# Patient Record
Sex: FEMALE | NOT HISPANIC OR LATINO | URBAN - METROPOLITAN AREA
[De-identification: names, ages, dates, MRNs, and addresses within clinical notes are randomized per-mention and may not be internally consistent; named-entity substitution may affect disease eponyms.]

---

## 2019-06-22 ENCOUNTER — EMERGENCY (EMERGENCY)
Facility: HOSPITAL | Age: 49
LOS: 1 days | Discharge: HOME | End: 2019-06-22
Attending: EMERGENCY MEDICINE | Admitting: EMERGENCY MEDICINE
Payer: COMMERCIAL

## 2019-06-22 VITALS
DIASTOLIC BLOOD PRESSURE: 99 MMHG | RESPIRATION RATE: 16 BRPM | HEART RATE: 94 BPM | SYSTOLIC BLOOD PRESSURE: 158 MMHG | OXYGEN SATURATION: 100 % | TEMPERATURE: 98 F

## 2019-06-22 LAB
ALBUMIN SERPL ELPH-MCNC: 3.9 G/DL — SIGNIFICANT CHANGE UP (ref 3.3–5)
ALP SERPL-CCNC: 82 U/L — SIGNIFICANT CHANGE UP (ref 40–120)
ALT FLD-CCNC: 16 U/L — SIGNIFICANT CHANGE UP (ref 4–33)
ANION GAP SERPL CALC-SCNC: 8 MMO/L — SIGNIFICANT CHANGE UP (ref 7–14)
ANISOCYTOSIS BLD QL: SIGNIFICANT CHANGE UP
AST SERPL-CCNC: 18 U/L — SIGNIFICANT CHANGE UP (ref 4–32)
BASOPHILS # BLD AUTO: 0.01 K/UL — SIGNIFICANT CHANGE UP (ref 0–0.2)
BASOPHILS NFR BLD AUTO: 0.1 % — SIGNIFICANT CHANGE UP (ref 0–2)
BILIRUB SERPL-MCNC: < 0.2 MG/DL — LOW (ref 0.2–1.2)
BUN SERPL-MCNC: 8 MG/DL — SIGNIFICANT CHANGE UP (ref 7–23)
CALCIUM SERPL-MCNC: 9.6 MG/DL — SIGNIFICANT CHANGE UP (ref 8.4–10.5)
CHLORIDE SERPL-SCNC: 106 MMOL/L — SIGNIFICANT CHANGE UP (ref 98–107)
CO2 SERPL-SCNC: 23 MMOL/L — SIGNIFICANT CHANGE UP (ref 22–31)
CREAT SERPL-MCNC: 0.58 MG/DL — SIGNIFICANT CHANGE UP (ref 0.5–1.3)
EOSINOPHIL # BLD AUTO: 0.2 K/UL — SIGNIFICANT CHANGE UP (ref 0–0.5)
EOSINOPHIL NFR BLD AUTO: 2.2 % — SIGNIFICANT CHANGE UP (ref 0–6)
GIANT PLATELETS BLD QL SMEAR: PRESENT — SIGNIFICANT CHANGE UP
GLUCOSE SERPL-MCNC: 94 MG/DL — SIGNIFICANT CHANGE UP (ref 70–99)
HCT VFR BLD CALC: 38.3 % — SIGNIFICANT CHANGE UP (ref 34.5–45)
HGB BLD-MCNC: 10.8 G/DL — LOW (ref 11.5–15.5)
HYPOCHROMIA BLD QL: SIGNIFICANT CHANGE UP
IMM GRANULOCYTES NFR BLD AUTO: 0.2 % — SIGNIFICANT CHANGE UP (ref 0–1.5)
LG PLATELETS BLD QL AUTO: SLIGHT — SIGNIFICANT CHANGE UP
LYMPHOCYTES # BLD AUTO: 1.41 K/UL — SIGNIFICANT CHANGE UP (ref 1–3.3)
LYMPHOCYTES # BLD AUTO: 15.5 % — SIGNIFICANT CHANGE UP (ref 13–44)
MAGNESIUM SERPL-MCNC: 1.9 MG/DL — SIGNIFICANT CHANGE UP (ref 1.6–2.6)
MANUAL SMEAR VERIFICATION: SIGNIFICANT CHANGE UP
MCHC RBC-ENTMCNC: 19.2 PG — LOW (ref 27–34)
MCHC RBC-ENTMCNC: 28.2 % — LOW (ref 32–36)
MCV RBC AUTO: 68.1 FL — LOW (ref 80–100)
MICROCYTES BLD QL: SIGNIFICANT CHANGE UP
MONOCYTES # BLD AUTO: 0.7 K/UL — SIGNIFICANT CHANGE UP (ref 0–0.9)
MONOCYTES NFR BLD AUTO: 7.7 % — SIGNIFICANT CHANGE UP (ref 2–14)
NEUTROPHILS # BLD AUTO: 6.74 K/UL — SIGNIFICANT CHANGE UP (ref 1.8–7.4)
NEUTROPHILS NFR BLD AUTO: 74.3 % — SIGNIFICANT CHANGE UP (ref 43–77)
NRBC # FLD: 0 K/UL — SIGNIFICANT CHANGE UP (ref 0–0)
PLATELET # BLD AUTO: 318 K/UL — SIGNIFICANT CHANGE UP (ref 150–400)
PLATELET CLUMP BLD QL SMEAR: SLIGHT — SIGNIFICANT CHANGE UP
PLATELET COUNT - ESTIMATE: NORMAL — SIGNIFICANT CHANGE UP
PMV BLD: SIGNIFICANT CHANGE UP FL (ref 7–13)
POIKILOCYTOSIS BLD QL AUTO: SLIGHT — SIGNIFICANT CHANGE UP
POTASSIUM SERPL-MCNC: 4.2 MMOL/L — SIGNIFICANT CHANGE UP (ref 3.5–5.3)
POTASSIUM SERPL-SCNC: 4.2 MMOL/L — SIGNIFICANT CHANGE UP (ref 3.5–5.3)
PROT SERPL-MCNC: 7.9 G/DL — SIGNIFICANT CHANGE UP (ref 6–8.3)
RBC # BLD: 5.62 M/UL — HIGH (ref 3.8–5.2)
RBC # FLD: 17.7 % — HIGH (ref 10.3–14.5)
SODIUM SERPL-SCNC: 137 MMOL/L — SIGNIFICANT CHANGE UP (ref 135–145)
TROPONIN T, HIGH SENSITIVITY: < 6 NG/L — SIGNIFICANT CHANGE UP (ref ?–14)
TSH SERPL-MCNC: 1.42 UIU/ML — SIGNIFICANT CHANGE UP (ref 0.27–4.2)
WBC # BLD: 9.08 K/UL — SIGNIFICANT CHANGE UP (ref 3.8–10.5)
WBC # FLD AUTO: 9.08 K/UL — SIGNIFICANT CHANGE UP (ref 3.8–10.5)

## 2019-06-22 PROCEDURE — 99218: CPT

## 2019-06-22 PROCEDURE — 70553 MRI BRAIN STEM W/O & W/DYE: CPT | Mod: 26

## 2019-06-22 PROCEDURE — 70496 CT ANGIOGRAPHY HEAD: CPT | Mod: 26

## 2019-06-22 PROCEDURE — 70498 CT ANGIOGRAPHY NECK: CPT | Mod: 26

## 2019-06-22 PROCEDURE — 93010 ELECTROCARDIOGRAM REPORT: CPT | Mod: 59

## 2019-06-22 PROCEDURE — 71046 X-RAY EXAM CHEST 2 VIEWS: CPT | Mod: 26

## 2019-06-22 RX ORDER — DIAZEPAM 5 MG
5 TABLET ORAL ONCE
Refills: 0 | Status: DISCONTINUED | OUTPATIENT
Start: 2019-06-22 | End: 2019-06-22

## 2019-06-22 RX ORDER — SODIUM CHLORIDE 9 MG/ML
1000 INJECTION INTRAMUSCULAR; INTRAVENOUS; SUBCUTANEOUS ONCE
Refills: 0 | Status: COMPLETED | OUTPATIENT
Start: 2019-06-22 | End: 2019-06-22

## 2019-06-22 RX ORDER — AMLODIPINE BESYLATE 2.5 MG/1
2.5 TABLET ORAL AT BEDTIME
Refills: 0 | Status: DISCONTINUED | OUTPATIENT
Start: 2019-06-22 | End: 2019-06-23

## 2019-06-22 RX ADMIN — AMLODIPINE BESYLATE 2.5 MILLIGRAM(S): 2.5 TABLET ORAL at 23:03

## 2019-06-22 RX ADMIN — Medication 1.5 MILLIGRAM(S): at 19:47

## 2019-06-22 RX ADMIN — Medication 1 MILLIGRAM(S): at 12:54

## 2019-06-22 RX ADMIN — SODIUM CHLORIDE 1000 MILLILITER(S): 9 INJECTION INTRAMUSCULAR; INTRAVENOUS; SUBCUTANEOUS at 11:43

## 2019-06-22 NOTE — CONSULT NOTE ADULT - ASSESSMENT
48F with intermittent headaches and CTH demonstrating colloid cyst.   - MRI w/wo contrast   - q. 4 hour neuro checks   - Will follow up with plan s/p MRI re: admission vs. outpatient follow up

## 2019-06-22 NOTE — ED CDU PROVIDER INITIAL DAY NOTE - ATTENDING CONTRIBUTION TO CARE
HOLLY VAZQUEZ MD: Reviewed and agree with the HPI as documented below:  48yF with a past medical history of HTN, asthma, panic attacks presenting with dizziness/lightheadedness with headache x 1 week. Pt reports she intermittently feels lightheaded and dizzy, not described as room spinning dizziness, that lasts for 30 minutes at a time. Associated she feels nausea and sometimes has b/l blurry vision. She reports intermittent head pressure and chest tightness as well (unsure if this is her panicking). Pt denies shortness of breath, palpitations, numbness, weakness, fever/chills, abd pain, n/v/d, back pain, recent travel or illness or any other concerns.  In ED EKG within normal, trop <6, CT head with small colloid cyst  Neurosurgical consulted who recommends MRI head, sent to CDU for MRI    As per UptoDate:  "Colloid cyst — Colloid cysts are a rare developmental malformation and not a true neoplasm. Colloid cysts are composed of an outer fibrous layer and an inner epithelium of ciliated or mucin-producing cells.    The vast majority of colloid cysts occur in the anterior third ventricle, between the forniceal columns in the roof of the third ventricle. Thus, even relatively small lesions may block the foramen of Monro, producing hydrocephalus.    They can occur at any age but usually become symptomatic in the third to the sixth decades. Although typically asymptomatic, colloid cysts can cause increased intracranial pressure and present with headache and papilledema. The headaches are typically frontal, intermittent, severe, of short duration, and associated with nausea and vomiting. The headache can be relieved by lying down [208]. Occasionally, gait abnormalities may occur as a result of hydrocephalus. The classic clinical description of intermittent headaches and drop attacks occurs in only one-third of patients. Rarely, sudden death can occur from obstruction of the ventricular system.    Imaging demonstrates iso- or hyperdense lesions on noncontrast CT. On MRI they may be T2 hyperintense or have a hypointense center. They do not enhance.    Surgical excision is curative, although it may be technically difficult. For patients with hydrocephalus, a ventriculoperitoneal shunt may be necessary. Stereotactic aspiration of a cyst may be useful, although there is a rate of high recurrence. Patients with small asymptomatic colloid cysts without evidence of hydrocephalus may be closely followed by serial examinations and neuroimaging studies. Colloid cyst — Colloid cysts are a rare developmental malformation and not a true neoplasm. Colloid cysts are composed of an outer fibrous layer and an inner epithelium of ciliated or mucin-producing cells.  The vast majority of colloid cysts occur in the anterior third ventricle, between the forniceal columns in the roof of the third ventricle. Thus, even relatively small lesions may block the foramen of Monro, producing hydrocephalus.  They can occur at any age but usually become symptomatic in the third to the sixth decades. Although typically asymptomatic, colloid cysts can cause increased intracranial pressure and present with headache and papilledema."    PHYSICAL EXAM:  Vital signs reviewed.  GENERAL: Patient is awake and alert and in no acute distress.  Non-toxic appearing.  A+Ox4  HEAD:  Airway patent.  No oropharyngeal edema.  No stridor.  Auricles are normal.    EYES: EOM grossly intact, conjunctiva non-injected and sclera clear  NECK: Supple, No vertebral point tenderness to palpation.  CHEST/LUNG: Lungs clear to auscultation bilaterally; no wheeze, no rhonchi,  no rales.    HEART: Regular rate and rhythm;   ABDOMEN: Soft, non-tender to palpation.  No rebound/no guarding.  Bowel sounds present x 4.   MSK/EXTREMITIES: No clubbing or cyanosis. Back is nontender, with no vertebral point tenderness to palpation, no CVAT.  Moving all 4 extremities.     NEURO: Neurologically grossly intact.   No obvious deficits.   PSYCH: Psychiatrically normal mood and affect.  No apparent risk to self or others.       DR. CESAR, ATTENDING MD:    I performed a face to face bedside interview with patient regarding history of present illness, review of symptoms and past medical history. I completed an independent physical exam.  I have discussed patient's plan of care with the team of health care providers.   I agree with note as stated above, having amended the EMR as needed to reflect my findings. I have discussed the assessment and plan of care.  This includes during the time I functioned as the attending physician for this patient.

## 2019-06-22 NOTE — ED CDU PROVIDER INITIAL DAY NOTE - OBJECTIVE STATEMENT
48yF w/pmhx HTN, asthma, panic attacks presenting with dizziness/lightheadedness with headache x 1 week. Pt reports she intermittently feels lightheaded and dizzy, not described as room spinning dizziness, that lasts for 30 minutes at a time. Associated she feels nausea and sometimes has b/l blurry vision. She reports intermittent head pressure and chest tightness as well (unsure if this is her panicking). Pt denies shortness of breath, palpitations, numbness, weakness, fever/chills, abd pain, n/v/d, back pain, recent travel or illness or any other concerns.  In ED EKG within normal, trop <6, CT head with small colloid cyst  Neurosurg consulted who recommends MRI head, sent to CDU for MRI

## 2019-06-22 NOTE — ED ADULT NURSE NOTE - OBJECTIVE STATEMENT
pt to rm 10 c/o dizziness for couple of days/  iv started in rt hand 22 g labs sent off/ fluids hung/  pt on cm nsr  in [place]

## 2019-06-22 NOTE — ED PROVIDER NOTE - OBJECTIVE STATEMENT
48F with pmh of HTN, asthma, panic attacks p/w intermittent chest tightness, dizziness, head pressure, left neck pain, blurry vision, dizziness, L arm tingling x 1 week.  Pt had an episode this morning after waking up. Describes lightheaded feeling, but not vertigo. No syncope. Feels much improved now. Pt denies exertional or pleuritic symptoms, neck injury, dysarthria, focal weakness, blood thinner use, dyspnea, PE risk factors. No prior stress test. No hx of strokes.

## 2019-06-22 NOTE — CONSULT NOTE ADULT - SUBJECTIVE AND OBJECTIVE BOX
CHIEF COMPLAINT/ REASON FOR CONSULTATION:    Headache   HPI:  48F presents to the ED c/o intermittent headache and nausea over the past week, associated with L arm pain and chest pain. She says that she has had headaches before, but these headaches have been more severe. No known triggers, and usually they resolved spontaneously after approximately 30 minutes. No loss of conciousness. She had a CTH performed in the ED which demonstrated a colloid cyst for which neurosurgery was consulted.     PAST MEDICAL HISTORY   Hypertension  Polyp of vagina  Asthma  Anemia    PAST SURGICAL HISTORY   No Past Surgical History        MEDICATIONS:  Antibiotics:    Neuro:    Anticoagulation:    Other:  amLODIPine   Tablet 2.5 milliGRAM(s) Oral at bedtime      SOCIAL HISTORY:   , lives at home.     FAMILY HISTORY:      REVIEW OF SYSTEMS:  Check here if all are normal other than Neurological []  Negative except as stated above.     PHYSICAL EXAMINATION:   T(C): 36.9 (06-22-19 @ 17:31), Max: 36.9 (06-22-19 @ 17:31)  HR: 86 (06-22-19 @ 17:31) (86 - 94)  BP: 143/93 (06-22-19 @ 17:31) (143/93 - 158/99)  RR: 17 (06-22-19 @ 17:31) (16 - 20)  SpO2: 100% (06-22-19 @ 17:31) (100% - 100%)  Wt(kg): --Height (cm): 160.02 (06-22 @ 17:31)  Weight (kg): 86.2 (06-22 @ 17:31)    Exam:  Awake, Alert, AOX3  PERRL, EOMI, Face equal, Tongue m/l  5/5 throughout, no drift  SILT    LABS:                        10.8   9.08  )-----------( 318      ( 22 Jun 2019 12:05 )             38.3     06-22    137  |  106  |  8   ----------------------------<  94  4.2   |  23  |  0.58    Ca    9.6      22 Jun 2019 12:05  Mg     1.9     06-22    TPro  7.9  /  Alb  3.9  /  TBili  < 0.2<L>  /  DBili  x   /  AST  18  /  ALT  16  /  AlkPhos  82  06-22          RADIOLOGY & ADDITIONAL STUDIES:  CT BRAIN:    A 5 mm hyperdense lesion anterior to the third ventricle which likely   represent a colloid cyst.    The ventricles and sulci are normal in size.    No acute intracranial hemorrhage, extra-axial fluid collection,   hydrocephalus, mass effect or midline shift.    The calvarium is intact. Left maxillary sinus mucosal polyp versus   retention cyst. Remaining paranasal sinuses and mastoid air cells are   free of acute disease    CT ANGIOGRAPHY NECK:    Three-vessel left-sided aortic arch.    There is good enhancement of the bilateral common and internal carotid   arteries. The vertebral arteries are well visualized.    No flow-limiting stenosis, arterial dissection or vascular aneurysm.   Carotid bifurcations and origins of great vessels are unremarkable.    Heterogeneous enlarged thyroid gland.    CT ANGIOGRAPHY BRAIN:    There is no significant stenosis of the bilateral anterior, middle and   posterior cerebral arteries and basilar artery.    The intracranial internal carotid and vertebral arteries show normal   enhancement.    No vascular aneurysm or flow-limiting stenosis. No enlarged vascular   lesions or clusters of abnormal vessels are noted to suggest an arterial   venous malformation.    Visualized portions of the superficial and deep venous systems are   unremarkable.

## 2019-06-22 NOTE — ED PROVIDER NOTE - PHYSICAL EXAMINATION
GEN - NAD; well appearing; A+O x3   HEAD - NC/AT     EYES - EOMI, no conjunctival pallor, no scleral icterus  ENT -   mucous membranes  moist , no discharge      NECK - Neck supple, no masses or bruits  PULM - CTA b/l,  symmetric breath sounds  COR -  RRR, S1 S2, no murmurs  ABD - , ND, NT, soft, no guarding, no rebound, no masses    BACK - no CVA tenderness, nontender spine     EXTREMS - no edema, no deformity, warm and well perfused    SKIN - no rash or bruising      NEUROLOGIC - A&Ox3, PERRLA, EOMI, no nystagmus, CN2-12 grossly intact, no pronator drift, normal finger to nose and rapid alternating finger movements, normal sensation and 5/5 strength in all extremities, normal gait, symmetric patellar reflexes

## 2019-06-22 NOTE — ED PROVIDER NOTE - CLINICAL SUMMARY MEDICAL DECISION MAKING FREE TEXT BOX
48F with pmh of panic attacks, HTN, asthma p/w multiple complaints including vision changes, dizziness, chest tightness, L arm tingling, tinnitus, neck pain. Normal exam. EKG nonischemic. Vital hypertensive. Will r/o ACS, dysrhythmia, electrolyte abnormality, thyrotoxicosis, consider internal carotid/vertebral artery dissection, ?TIA. Do not think PE or AD. Plan for tele monitoring, labs, including trop/TSH, CXR, CT head, CTA neck/head, consider CDU for tele monitoring and stress.

## 2019-06-22 NOTE — ED PROVIDER NOTE - PROGRESS NOTE DETAILS
pt feels improved.  no headache or chest pain. no longer dizzy. CT results reviewed, +colloid cyst, spoke to neurosurgery and will come eval pt. Pt offered CDU for stress test, but prefers to schedule it as outpatient. Neurosurgery would like MRI brain with/without contrast. OK for pt to go to CDU. Will see pt once done in OR. Pt signed out to CDU team. Pt has hx of claustrophobia, willing to get sedation to obtain MRI.

## 2019-06-23 VITALS
OXYGEN SATURATION: 100 % | TEMPERATURE: 98 F | DIASTOLIC BLOOD PRESSURE: 88 MMHG | SYSTOLIC BLOOD PRESSURE: 140 MMHG | RESPIRATION RATE: 17 BRPM | HEART RATE: 88 BPM

## 2019-06-23 PROCEDURE — 99217: CPT

## 2019-06-23 RX ORDER — ACETAMINOPHEN 500 MG
650 TABLET ORAL ONCE
Refills: 0 | Status: COMPLETED | OUTPATIENT
Start: 2019-06-23 | End: 2019-06-23

## 2019-06-23 RX ORDER — AMLODIPINE BESYLATE 2.5 MG/1
2.5 TABLET ORAL AT BEDTIME
Refills: 0 | Status: DISCONTINUED | OUTPATIENT
Start: 2019-06-23 | End: 2019-06-26

## 2019-06-23 RX ORDER — AMLODIPINE BESYLATE 2.5 MG/1
10 TABLET ORAL DAILY
Refills: 0 | Status: DISCONTINUED | OUTPATIENT
Start: 2019-06-23 | End: 2019-06-23

## 2019-06-23 RX ADMIN — Medication 650 MILLIGRAM(S): at 12:19

## 2019-06-23 RX ADMIN — Medication 650 MILLIGRAM(S): at 12:09

## 2019-06-23 NOTE — ED CDU PROVIDER SUBSEQUENT DAY NOTE - HISTORY
CDU Initial Day Note: 48yF w/pmhx HTN, asthma, panic attacks presenting with dizziness/lightheadedness with headache x 1 week. Pt reports she intermittently feels lightheaded and dizzy, not described as room spinning dizziness, that lasts for 30 minutes at a time. Associated she feels nausea and sometimes has b/l blurry vision. She reports intermittent head pressure and chest tightness as well (unsure if this is her panicking). Pt denies shortness of breath, palpitations, numbness, weakness, fever/chills, abd pain, n/v/d, back pain, recent travel or illness or any other concerns. In ED EKG within normal, trop <6, CT head with small colloid cyst  Neurosurg consulted who recommends MRI head, sent to CDU for MRI  CDU Subsequent Day Note: HENRY Osullivan, Agree w/ above, pt pending MRI brain. No acute interval changes since sign out at 7pm yesterday.

## 2019-06-23 NOTE — ED CDU PROVIDER SUBSEQUENT DAY NOTE - ATTENDING CONTRIBUTION TO CARE
Dr Bloch, ATTENDING MD-  I performed a face to face bedside interview with patient regarding history of present illness, review of symptoms and past medical history. I completed an independent physical exam.  I have discussed patient's plan of care with PA.   Documentation as above in the note.  Patient examined well appearing, Full EOM, perrl 2-12 intact, no cerebellar deficits, heart sounds nml lungs clear abd soft nontender. motor and sensory intact. Symptoms are lightheadedness and not vertigo, possibly BP med related.

## 2019-06-23 NOTE — ED CDU PROVIDER SUBSEQUENT DAY NOTE - MEDICAL DECISION MAKING DETAILS
48yF w/pmhx HTN, asthma, panic attacks p/w dizziness and lightheadedness x 1 week, intermittent. CT head with small colloid cyst, non focal neuro exam. Sent to CDU for MRI and neurosurgery consult. Pt is asymptomatic at this time. As per UptoDate: "The headaches are typically frontal, intermittent, severe, of short duration, and associated with nausea and vomiting. The headache can be relieved by lying down. Occasionally, gait abnormalities may occur as a result of hydrocephalus. The classic clinical description of intermittent headaches and drop attacks occurs in only one-third of patients. Rarely, sudden death can occur from obstruction of the ventricular system.  Imaging demonstrates iso- or hyperdense lesions on noncontrast CT. On MRI they may be T2 hyperintense or have a hypointense center. They do not enhance.  Surgical excision is curative, although it may be technically difficult. For patients with hydrocephalus, a ventriculoperitoneal shunt may be necessary. Stereotactic aspiration of a cyst may be useful, although there is a rate of high recurrence. Patients with small asymptomatic colloid cysts without evidence of hydrocephalus may be closely followed by serial examinations and neuroimaging studies."

## 2019-06-23 NOTE — ED CDU PROVIDER DISPOSITION NOTE - ATTENDING CONTRIBUTION TO CARE
Dr Bloch, ATTENDING MD-  I performed a face to face bedside interview with patient regarding history of present illness, review of symptoms and past medical history. I completed an independent physical exam.  I have discussed patient's plan of care with PA.   Documentation as above in the note.   patient well appearing asymptomatic- okay for d/c

## 2019-06-23 NOTE — ED CDU PROVIDER DISPOSITION NOTE - CLINICAL COURSE
PT is a 48yF w/pmhx HTN, asthma, panic attacks who presented to ED c/o dizziness/lightheadedness with headache x 1 week. Pt notes sx have been a/w recent change in taking her amlodipine in AM. Pt seen and worked up in ED, CTA's negative, only showing small colloid cyst. Neurosurgery consulted, recommended MR's. Pt transferred to CDU for; MRI brain, neuro checks, vitals q4 and frequent re-evals. Pt labs wnl, trop neg <6, cxr normal. Pt MRI brain no acute findings, discussed results with neurosurgery team who state patient stable for dc and outpatient f/u with Dr. Tabor. Results reviewed and discussed with patient. Advised she f/u with PCP regarding BP and possible medication side effects, advised to stay hydrated, rest and f/u with PCP and Neurosurgery. All questions and concerns addressed. Strict return instructions given. No complaints noted.

## 2019-06-23 NOTE — ED CDU PROVIDER DISPOSITION NOTE - CARE PROVIDER_API CALL
Maninder Tabor)  Neurosurgery  General  [address], [address]  [place] [address]  Phone: [phone]  Fax: [phone]  Follow Up Time: 1-3 Days

## 2019-06-23 NOTE — ED CDU PROVIDER DISPOSITION NOTE - NSFOLLOWUPINSTRUCTIONS_ED_ALL_ED_FT
Patient advised to follow up with  PRIMARY CARE DOCTOR IN 1-2 DAYS AND NEUROSURGERY WITHIN WEEK and told to return to the emergency department immediately for any new or concerning symptoms such as worsening dizziness, headaches, nausea, vomiting, room spinning, weakness, numbness or tingling  OR ANY OTHER COMPLAINTS. Patient agrees with plan.    Continue home medications   Rest, stay hydrated   Monitor blood pressure at home, recommend speaking with primary care doctor to make possible adjustments to blood pressure medication as dizziness could possibly be side effect   -FOLLOW UP WITH NEUROSURGEON REGARDING MRI RESULTS

## 2019-06-28 PROBLEM — I10 ESSENTIAL (PRIMARY) HYPERTENSION: Chronic | Status: ACTIVE | Noted: 2019-06-22

## 2019-07-02 ENCOUNTER — APPOINTMENT (OUTPATIENT)
Dept: NEUROSURGERY | Facility: CLINIC | Age: 49
End: 2019-07-02
Payer: COMMERCIAL

## 2019-07-02 VITALS
DIASTOLIC BLOOD PRESSURE: 90 MMHG | OXYGEN SATURATION: 99 % | HEIGHT: 62 IN | HEART RATE: 81 BPM | RESPIRATION RATE: 16 BRPM | BODY MASS INDEX: 34.96 KG/M2 | TEMPERATURE: 98.3 F | SYSTOLIC BLOOD PRESSURE: 153 MMHG | WEIGHT: 190 LBS

## 2019-07-02 DIAGNOSIS — Z86.2 PERSONAL HISTORY OF DISEASES OF THE BLOOD AND BLOOD-FORMING ORGANS AND CERTAIN DISORDERS INVOLVING THE IMMUNE MECHANISM: ICD-10-CM

## 2019-07-02 DIAGNOSIS — Z86.79 PERSONAL HISTORY OF OTHER DISEASES OF THE CIRCULATORY SYSTEM: ICD-10-CM

## 2019-07-02 DIAGNOSIS — Z78.9 OTHER SPECIFIED HEALTH STATUS: ICD-10-CM

## 2019-07-02 DIAGNOSIS — Z83.3 FAMILY HISTORY OF DIABETES MELLITUS: ICD-10-CM

## 2019-07-02 DIAGNOSIS — Z82.49 FAMILY HISTORY OF ISCHEMIC HEART DISEASE AND OTHER DISEASES OF THE CIRCULATORY SYSTEM: ICD-10-CM

## 2019-07-02 DIAGNOSIS — Z86.59 PERSONAL HISTORY OF OTHER MENTAL AND BEHAVIORAL DISORDERS: ICD-10-CM

## 2019-07-02 PROCEDURE — 99243 OFF/OP CNSLTJ NEW/EST LOW 30: CPT

## 2019-07-02 NOTE — PHYSICAL EXAM
[General Appearance - Alert] : alert [General Appearance - In No Acute Distress] : in no acute distress [General Appearance - Well Developed] : well developed [General Appearance - Well Nourished] : well nourished [General Appearance - Well-Appearing] : healthy appearing [Oriented To Time, Place, And Person] : oriented to person, place, and time [Impaired Insight] : insight and judgment were intact [Memory Recent] : recent memory was not impaired [Mood] : the mood was normal [Affect] : the affect was normal [Person] : oriented to person [Place] : oriented to place [Cranial Nerves Optic (II)] : visual acuity intact bilaterally,  pupils equal round and reactive to light [Time] : oriented to time [Cranial Nerves Oculomotor (III)] : extraocular motion intact [Cranial Nerves Vestibulocochlear (VIII)] : hearing was intact bilaterally [Cranial Nerves Trigeminal (V)] : facial sensation intact symmetrically [Cranial Nerves Facial (VII)] : face symmetrical [Cranial Nerves Glossopharyngeal (IX)] : tongue and palate midline [Cranial Nerves Accessory (XI - Cranial And Spinal)] : head turning and shoulder shrug symmetric [Cranial Nerves Hypoglossal (XII)] : there was no tongue deviation with protrusion [Motor Handedness Right-Handed] : the patient is right hand dominant [Motor Strength] : muscle strength was normal in all four extremities [5] : C5 Biceps 5/5 [Balance] : balance was intact [Sclera] : the sclera and conjunctiva were normal [Outer Ear] : the ears and nose were normal in appearance [Hearing Threshold Finger Rub Not Howard] : hearing was normal [Extraocular Movements] : extraocular movements were intact [PERRL With Normal Accommodation] : pupils were equal in size, round, reactive to light, with normal accommodation [Oropharynx] : the oropharynx was normal [Respiration, Rhythm And Depth] : normal respiratory rhythm and effort [Neck Appearance] : the appearance of the neck was normal [Auscultation Breath Sounds / Voice Sounds] : lungs were clear to auscultation bilaterally [Exaggerated Use Of Accessory Muscles For Inspiration] : no accessory muscle use [Apical Impulse] : the apical impulse was normal [Heart Sounds] : normal S1 and S2 [Heart Rate And Rhythm] : heart rate was normal and rhythm regular [Abnormal Walk] : normal gait [Involuntary Movements] : no involuntary movements were seen [Skin Color & Pigmentation] : normal skin color and pigmentation [Motor Tone] : muscle strength and tone were normal [1+] : Ankle jerk right 1+ [Sensation Tactile Decrease] : light touch was intact [Sensation Pain / Temperature Decrease] : pain and temperature was intact [Limited Balance] : balance was intact [FreeTextEntry7] : normal and equal sensation in all extremities [No APD] : no afferent pupillary defect [Full Visual Field] : full visual field [] : the neck was supple [FreeTextEntry1] : Fundus exam normal.

## 2019-07-02 NOTE — CONSULT LETTER
[Dear  ___] : Dear ~MYIA, [Consult Letter:] : I had the pleasure of evaluating your patient, [unfilled]. [Please see my note below.] : Please see my note below. [Consult Closing:] : Thank you very much for allowing me to participate in the care of this patient.  If you have any questions, please do not hesitate to contact me. [Sincerely,] : Sincerely, [FreeTextEntry2] : Teo Portillo MD\par [address]\[place] [address] [FreeTextEntry3] : Kameron Rodney MD, FACS, FAANS\par Professor and \par Department of Neurosurgery\par [facility] [place]\par [address] [place] [address], [address] [place] [address]\[place] [address]\par [phone] Clinical\par [phone] Academic\par

## 2019-07-02 NOTE — ASSESSMENT
[FreeTextEntry1] : IMPRESSION:\par 1. Very small colloid cyst (5 mm) in the roof of the IIIrd ventricle with no hydrocephalus - incidental\par 2. Hypertension\par 3. Episode of headache, nausea, dizziness\par 4. Social anxiety disorder\par \par PLAN:\par 1. Repeat MRI brain wo in 1 year.\par 2. Reassured patient that small size of colloid cyst is not contributing to her headaches.\par 3. Encouraged her to seek professional help for her anxiety\par 4. Discussed with Dr. Teo Portillo\par \par Kameron Rodney MD, FACS, FAANS\par Professor and \par Department of Neurosurgery\par [facility]\par [address], [address]\[place] [address]\par [phone] Clinical\par [phone] Academic\par \par

## 2019-07-02 NOTE — REASON FOR VISIT
[New Patient Visit] : a new patient visit [Referred By: _________] : Patient was referred by MAKAYLA [Spouse] : spouse

## 2019-07-02 NOTE — REVIEW OF SYSTEMS
[Dizziness] : dizziness [Anxiety] : anxiety [Chest Pain] : chest pain [Negative] : Heme/Lymph [de-identified] : headaches [FreeTextEntry2] : fatigue [FreeTextEntry5] : with panic attacks [FreeTextEntry3] : blurry vision

## 2019-07-02 NOTE — HISTORY OF PRESENT ILLNESS
[< 3 months] : less than 3 months [de-identified] : Te Zuluaga is a pleasant right handed 48 year old lady of [place] descent who presents for consultation regarding a small colloid cyst that was found incidentally on MRI brain done during an ER visit at [facility].  Per patient, after recent titration of BP medications she had an episode of elevated blood pressure, nausea, dizziness, generalized pain, head pressure.  She described pain as being sharp, 5-6/10 and more so in both temporal areas.  Pain was controlled with Tylenol prn.\par \par She is currently under the care of her PCP Dr. Teo Portillo,[address] [place] [id number], [phone]\par \par PMHX includes HTN, asthma, anxiety disorder, social anxiety \par \par Social history,  with 1 child (5 years old). [FreeTextEntry1] : "Colloid cyst"

## 2019-07-02 NOTE — DATA REVIEWED
[de-identified] : EXAM: MR BRAIN WAW IC    PROCEDURE DATE: Jun 22 2019     INTERPRETATION: CLINICAL INFORMATION: Headache. Colloid cyst seen on recent  CT of the head.   TECHNIQUE: Multiplanar, multisequence MRI of the brain was performed without  and with contrast.   8.5 mL of Gadavist was injected, with 1.5 mL discarded.   COMPARISON: CT head 6/22/2019 at 1:22 PM.   FINDINGS:   0.5 cm round rim-enhancing lesion in the roof of the 3rd ventricle (13, 88),  consistent with the colloid cyst seen on recent CT of the head. No  effacement of the foramen of Monro or associated hydrocephalus. The lesion  is intermediate in signal on T1 and hypointense on T2 compatible with  proteinaceous contents.   No midline shift, mass effect, vasogenic edema or intracranial hemorrhage.  No abnormal extra-axial or intra-axial fluid collections. No abnormal  diffusion restriction to suggest an acute or subacute infarct. No focal  parenchymal signal abnormality. Flow voids are seen within the major  intracranial vessels, consistent with their patency.   The visualized paranasal sinuses and mastoid air cells are clear. The  orbits, sellar and suprasellar structures, and craniocervical junction are  unremarkable.   IMPRESSION:   Small colloid cyst along the roof of the 3rd ventricle. No associated mass  effect or hydrocephalus.   No other acute intracranial pathology.        KEITH BURDICK M.D., RADIOLOGY RESIDENT  This document has been electronically signed.  BOBBY TROTTER M.D., ATTENDING RADIOLOGIST  This document has been electronically signed. Jun 23 2019 9:10AM

## 2019-12-17 ENCOUNTER — EMERGENCY (EMERGENCY)
Facility: HOSPITAL | Age: 49
LOS: 1 days | Discharge: HOME | End: 2019-12-17
Attending: EMERGENCY MEDICINE | Admitting: EMERGENCY MEDICINE
Payer: COMMERCIAL

## 2019-12-17 VITALS
OXYGEN SATURATION: 100 % | DIASTOLIC BLOOD PRESSURE: 77 MMHG | RESPIRATION RATE: 18 BRPM | HEART RATE: 80 BPM | TEMPERATURE: 97 F | SYSTOLIC BLOOD PRESSURE: 148 MMHG

## 2019-12-17 LAB
ALBUMIN SERPL ELPH-MCNC: 4.1 G/DL — SIGNIFICANT CHANGE UP (ref 3.3–5)
ALP SERPL-CCNC: 83 U/L — SIGNIFICANT CHANGE UP (ref 40–120)
ALT FLD-CCNC: 19 U/L — SIGNIFICANT CHANGE UP (ref 4–33)
ANION GAP SERPL CALC-SCNC: 12 MMO/L — SIGNIFICANT CHANGE UP (ref 7–14)
ANISOCYTOSIS BLD QL: SIGNIFICANT CHANGE UP
APPEARANCE UR: CLEAR — SIGNIFICANT CHANGE UP
AST SERPL-CCNC: 19 U/L — SIGNIFICANT CHANGE UP (ref 4–32)
BASE EXCESS BLDV CALC-SCNC: 1.6 MMOL/L — SIGNIFICANT CHANGE UP
BASOPHILS # BLD AUTO: 0.03 K/UL — SIGNIFICANT CHANGE UP (ref 0–0.2)
BASOPHILS NFR BLD AUTO: 0.4 % — SIGNIFICANT CHANGE UP (ref 0–2)
BASOPHILS NFR SPEC: 0.9 % — SIGNIFICANT CHANGE UP (ref 0–2)
BILIRUB SERPL-MCNC: 0.2 MG/DL — SIGNIFICANT CHANGE UP (ref 0.2–1.2)
BILIRUB UR-MCNC: NEGATIVE — SIGNIFICANT CHANGE UP
BLASTS # FLD: 0 % — SIGNIFICANT CHANGE UP (ref 0–0)
BLOOD GAS VENOUS - CREATININE: 0.74 MG/DL — SIGNIFICANT CHANGE UP (ref 0.5–1.3)
BLOOD GAS VENOUS - FIO2: 21 — SIGNIFICANT CHANGE UP
BLOOD UR QL VISUAL: NEGATIVE — SIGNIFICANT CHANGE UP
BUN SERPL-MCNC: 8 MG/DL — SIGNIFICANT CHANGE UP (ref 7–23)
CALCIUM SERPL-MCNC: 9.2 MG/DL — SIGNIFICANT CHANGE UP (ref 8.4–10.5)
CHLORIDE BLDV-SCNC: 101 MMOL/L — SIGNIFICANT CHANGE UP (ref 96–108)
CHLORIDE SERPL-SCNC: 100 MMOL/L — SIGNIFICANT CHANGE UP (ref 98–107)
CO2 SERPL-SCNC: 23 MMOL/L — SIGNIFICANT CHANGE UP (ref 22–31)
COLOR SPEC: COLORLESS — SIGNIFICANT CHANGE UP
CREAT SERPL-MCNC: 0.68 MG/DL — SIGNIFICANT CHANGE UP (ref 0.5–1.3)
ELLIPTOCYTES BLD QL SMEAR: SLIGHT — SIGNIFICANT CHANGE UP
EOSINOPHIL # BLD AUTO: 0.21 K/UL — SIGNIFICANT CHANGE UP (ref 0–0.5)
EOSINOPHIL NFR BLD AUTO: 2.9 % — SIGNIFICANT CHANGE UP (ref 0–6)
EOSINOPHIL NFR FLD: 0 % — SIGNIFICANT CHANGE UP (ref 0–6)
GAS PNL BLDV: 139 MMOL/L — SIGNIFICANT CHANGE UP (ref 136–146)
GIANT PLATELETS BLD QL SMEAR: PRESENT — SIGNIFICANT CHANGE UP
GLUCOSE BLDV-MCNC: 91 MG/DL — SIGNIFICANT CHANGE UP (ref 70–99)
GLUCOSE SERPL-MCNC: 95 MG/DL — SIGNIFICANT CHANGE UP (ref 70–99)
GLUCOSE UR-MCNC: NEGATIVE — SIGNIFICANT CHANGE UP
HCO3 BLDV-SCNC: 25 MMOL/L — SIGNIFICANT CHANGE UP (ref 20–27)
HCT VFR BLD CALC: 36.4 % — SIGNIFICANT CHANGE UP (ref 34.5–45)
HCT VFR BLDV CALC: 33.2 % — LOW (ref 34.5–45)
HGB BLD-MCNC: 10.5 G/DL — LOW (ref 11.5–15.5)
HGB BLDV-MCNC: 10.8 G/DL — LOW (ref 11.5–15.5)
HYPOCHROMIA BLD QL: SIGNIFICANT CHANGE UP
IMM GRANULOCYTES NFR BLD AUTO: 0.4 % — SIGNIFICANT CHANGE UP (ref 0–1.5)
KETONES UR-MCNC: NEGATIVE — SIGNIFICANT CHANGE UP
LACTATE BLDV-MCNC: 1.4 MMOL/L — SIGNIFICANT CHANGE UP (ref 0.5–2)
LEUKOCYTE ESTERASE UR-ACNC: NEGATIVE — SIGNIFICANT CHANGE UP
LIDOCAIN IGE QN: 18.9 U/L — SIGNIFICANT CHANGE UP (ref 7–60)
LYMPHOCYTES # BLD AUTO: 1.19 K/UL — SIGNIFICANT CHANGE UP (ref 1–3.3)
LYMPHOCYTES # BLD AUTO: 16.3 % — SIGNIFICANT CHANGE UP (ref 13–44)
LYMPHOCYTES NFR SPEC AUTO: 23.5 % — SIGNIFICANT CHANGE UP (ref 13–44)
MCHC RBC-ENTMCNC: 19.4 PG — LOW (ref 27–34)
MCHC RBC-ENTMCNC: 28.8 % — LOW (ref 32–36)
MCV RBC AUTO: 67.4 FL — LOW (ref 80–100)
METAMYELOCYTES # FLD: 0 % — SIGNIFICANT CHANGE UP (ref 0–1)
MICROCYTES BLD QL: SIGNIFICANT CHANGE UP
MONOCYTES # BLD AUTO: 0.64 K/UL — SIGNIFICANT CHANGE UP (ref 0–0.9)
MONOCYTES NFR BLD AUTO: 8.8 % — SIGNIFICANT CHANGE UP (ref 2–14)
MONOCYTES NFR BLD: 4.3 % — SIGNIFICANT CHANGE UP (ref 2–9)
MYELOCYTES NFR BLD: 0 % — SIGNIFICANT CHANGE UP (ref 0–0)
NEUTROPHIL AB SER-ACNC: 70.4 % — SIGNIFICANT CHANGE UP (ref 43–77)
NEUTROPHILS # BLD AUTO: 5.18 K/UL — SIGNIFICANT CHANGE UP (ref 1.8–7.4)
NEUTROPHILS NFR BLD AUTO: 71.2 % — SIGNIFICANT CHANGE UP (ref 43–77)
NEUTS BAND # BLD: 0 % — SIGNIFICANT CHANGE UP (ref 0–6)
NITRITE UR-MCNC: NEGATIVE — SIGNIFICANT CHANGE UP
NRBC # FLD: 0 K/UL — SIGNIFICANT CHANGE UP (ref 0–0)
OTHER - HEMATOLOGY %: 0 — SIGNIFICANT CHANGE UP
OVALOCYTES BLD QL SMEAR: SLIGHT — SIGNIFICANT CHANGE UP
PCO2 BLDV: 49 MMHG — SIGNIFICANT CHANGE UP (ref 41–51)
PH BLDV: 7.35 PH — SIGNIFICANT CHANGE UP (ref 7.32–7.43)
PH UR: 6.5 — SIGNIFICANT CHANGE UP (ref 5–8)
PLATELET # BLD AUTO: 323 K/UL — SIGNIFICANT CHANGE UP (ref 150–400)
PLATELET COUNT - ESTIMATE: NORMAL — SIGNIFICANT CHANGE UP
PMV BLD: SIGNIFICANT CHANGE UP FL (ref 7–13)
PO2 BLDV: 37 MMHG — SIGNIFICANT CHANGE UP (ref 35–40)
POIKILOCYTOSIS BLD QL AUTO: SLIGHT — SIGNIFICANT CHANGE UP
POLYCHROMASIA BLD QL SMEAR: SLIGHT — SIGNIFICANT CHANGE UP
POTASSIUM BLDV-SCNC: 3.9 MMOL/L — SIGNIFICANT CHANGE UP (ref 3.4–4.5)
POTASSIUM SERPL-MCNC: 4.1 MMOL/L — SIGNIFICANT CHANGE UP (ref 3.5–5.3)
POTASSIUM SERPL-SCNC: 4.1 MMOL/L — SIGNIFICANT CHANGE UP (ref 3.5–5.3)
PROMYELOCYTES # FLD: 0 % — SIGNIFICANT CHANGE UP (ref 0–0)
PROT SERPL-MCNC: 7.9 G/DL — SIGNIFICANT CHANGE UP (ref 6–8.3)
PROT UR-MCNC: NEGATIVE — SIGNIFICANT CHANGE UP
RBC # BLD: 5.4 M/UL — HIGH (ref 3.8–5.2)
RBC # FLD: 17.7 % — HIGH (ref 10.3–14.5)
SAO2 % BLDV: 58.2 % — LOW (ref 60–85)
SODIUM SERPL-SCNC: 135 MMOL/L — SIGNIFICANT CHANGE UP (ref 135–145)
SP GR SPEC: 1.01 — SIGNIFICANT CHANGE UP (ref 1–1.04)
TROPONIN T, HIGH SENSITIVITY: < 6 NG/L — SIGNIFICANT CHANGE UP (ref ?–14)
UROBILINOGEN FLD QL: NORMAL — SIGNIFICANT CHANGE UP
VARIANT LYMPHS # BLD: 0.9 % — SIGNIFICANT CHANGE UP
WBC # BLD: 7.28 K/UL — SIGNIFICANT CHANGE UP (ref 3.8–10.5)
WBC # FLD AUTO: 7.28 K/UL — SIGNIFICANT CHANGE UP (ref 3.8–10.5)

## 2019-12-17 PROCEDURE — 93010 ELECTROCARDIOGRAM REPORT: CPT

## 2019-12-17 PROCEDURE — 71046 X-RAY EXAM CHEST 2 VIEWS: CPT | Mod: 26

## 2019-12-17 PROCEDURE — 99284 EMERGENCY DEPT VISIT MOD MDM: CPT | Mod: 25

## 2019-12-17 RX ORDER — FAMOTIDINE 10 MG/ML
20 INJECTION INTRAVENOUS ONCE
Refills: 0 | Status: COMPLETED | OUTPATIENT
Start: 2019-12-17 | End: 2019-12-17

## 2019-12-17 RX ORDER — LIDOCAINE 4 G/100G
10 CREAM TOPICAL ONCE
Refills: 0 | Status: COMPLETED | OUTPATIENT
Start: 2019-12-17 | End: 2019-12-17

## 2019-12-17 RX ADMIN — Medication 30 MILLILITER(S): at 11:29

## 2019-12-17 RX ADMIN — LIDOCAINE 10 MILLILITER(S): 4 CREAM TOPICAL at 11:28

## 2019-12-17 RX ADMIN — FAMOTIDINE 20 MILLIGRAM(S): 10 INJECTION INTRAVENOUS at 11:29

## 2019-12-17 NOTE — ED PROVIDER NOTE - OBJECTIVE STATEMENT
47 yo female with a h/o HTN on amlodipine and asthma c/o 3 days of constant chest pain. Pain began at rest and is not worse with exertion. NO associated SOB, nausea or vomiting. No diarrhea or constipation. [place] [facility] [place] and started on Pepcid, sucralfate, and pantoprazole but hasn't had relief. NO LE pain or edema. No weakness or paresthesias. Pt also c/o intermittent back pain but no flank pain, dysuria or hematuria. No cough, fevers or chills. No recent travel or immobilization. + family h/o CAD- dad in his 60's at time of diagnosis. NO family h/o DVT/PE.

## 2019-12-17 NOTE — ED PROVIDER NOTE - PATIENT PORTAL LINK FT
You can access the FollowMyHealth Patient Portal offered by [facility] by registering at the following website: [email or web address]. By joining [employer]’s FollowMyHealth portal, you will also be able to view your health information using other applications (apps) compatible with our system.

## 2019-12-17 NOTE — ED PROVIDER NOTE - NSFOLLOWUPINSTRUCTIONS_ED_ALL_ED_FT
Make an appointment to follow up with the cardiologist DR Gamboa in 1-3 days. Return to the Emergency department for any worsening or concerning symptoms.

## 2019-12-17 NOTE — ED PROVIDER NOTE - PROGRESS NOTE DETAILS
Richelle: pt feeling better, offered CDU but declined. Pt need to  child. will d.c to f/u with outpatient cardiology. Richelle: pt feeling better, offered CDU stay for more of a cardiac workup but pt declined. Pt needs to  child and doesn't want to stay overnight. will d.c to f/u with outpatient cardiology. discussed return precautions and need for follow up with patient and she expressed understanding.

## 2019-12-17 NOTE — ED PROVIDER NOTE - CARE PROVIDER_API CALL
Heron Gamboa (MD)  Cardiovascular Disease; Internal Medicine  [address] [address]  Phone: [phone]  Fax: [phone]  Follow Up Time: 1-3 Days

## 2019-12-17 NOTE — ED ADULT NURSE NOTE - OBJECTIVE STATEMENT
patient  Alert & ox3. patient complains of epigastric  and back pain, belching. pt . evaluated by MD. No complains of shortness  of breath  or  abdominal pain noted at this time. Placed 20g angio cath rt. AC., labs drawn and sent. Patient is comfortable ,Due meds given as per order. patient will be waiting for results, further evaluation and disposition.  made comfortable. will continue to monitor.

## 2019-12-17 NOTE — ED ADULT TRIAGE NOTE - CHIEF COMPLAINT QUOTE
pt. c/o epigastric pain/back x 4-5 days, not correlated w/ eating, seen at urgent care on Sunday, prescribed pepcid, sucralfate and pantoprazole w/ little relief. Pt reports excessive belching. Denies SOB, n/v/d. PMHx HTN, asthma.

## 2019-12-17 NOTE — ED PROVIDER NOTE - CLINICAL SUMMARY MEDICAL DECISION MAKING FREE TEXT BOX
49 yo female with several days of constant chest pain. Low suspicion for cardiac etiology but will obatin labs, urine, CXR, EKG, treat symptomatically,  and reassess.

## 2019-12-21 ENCOUNTER — EMERGENCY (EMERGENCY)
Facility: HOSPITAL | Age: 49
LOS: 1 days | Discharge: HOME | End: 2019-12-21
Attending: EMERGENCY MEDICINE | Admitting: EMERGENCY MEDICINE
Payer: COMMERCIAL

## 2019-12-21 VITALS
TEMPERATURE: 99 F | OXYGEN SATURATION: 100 % | HEART RATE: 82 BPM | SYSTOLIC BLOOD PRESSURE: 135 MMHG | DIASTOLIC BLOOD PRESSURE: 83 MMHG | RESPIRATION RATE: 18 BRPM

## 2019-12-21 LAB
ALBUMIN SERPL ELPH-MCNC: 3.8 G/DL — SIGNIFICANT CHANGE UP (ref 3.3–5)
ALP SERPL-CCNC: 78 U/L — SIGNIFICANT CHANGE UP (ref 40–120)
ALT FLD-CCNC: 15 U/L — SIGNIFICANT CHANGE UP (ref 4–33)
ANION GAP SERPL CALC-SCNC: 11 MMO/L — SIGNIFICANT CHANGE UP (ref 7–14)
ANISOCYTOSIS BLD QL: SLIGHT — SIGNIFICANT CHANGE UP
AST SERPL-CCNC: 24 U/L — SIGNIFICANT CHANGE UP (ref 4–32)
BASOPHILS # BLD AUTO: 0.01 K/UL — SIGNIFICANT CHANGE UP (ref 0–0.2)
BASOPHILS NFR BLD AUTO: 0.1 % — SIGNIFICANT CHANGE UP (ref 0–2)
BASOPHILS NFR SPEC: 0.9 % — SIGNIFICANT CHANGE UP (ref 0–2)
BILIRUB SERPL-MCNC: < 0.2 MG/DL — LOW (ref 0.2–1.2)
BLASTS # FLD: 0 % — SIGNIFICANT CHANGE UP (ref 0–0)
BUN SERPL-MCNC: 10 MG/DL — SIGNIFICANT CHANGE UP (ref 7–23)
CALCIUM SERPL-MCNC: 9.1 MG/DL — SIGNIFICANT CHANGE UP (ref 8.4–10.5)
CHLORIDE SERPL-SCNC: 104 MMOL/L — SIGNIFICANT CHANGE UP (ref 98–107)
CO2 SERPL-SCNC: 21 MMOL/L — LOW (ref 22–31)
CREAT SERPL-MCNC: 0.62 MG/DL — SIGNIFICANT CHANGE UP (ref 0.5–1.3)
DACRYOCYTES BLD QL SMEAR: SLIGHT — SIGNIFICANT CHANGE UP
EOSINOPHIL # BLD AUTO: 0.24 K/UL — SIGNIFICANT CHANGE UP (ref 0–0.5)
EOSINOPHIL NFR BLD AUTO: 2.7 % — SIGNIFICANT CHANGE UP (ref 0–6)
EOSINOPHIL NFR FLD: 0.9 % — SIGNIFICANT CHANGE UP (ref 0–6)
GIANT PLATELETS BLD QL SMEAR: PRESENT — SIGNIFICANT CHANGE UP
GLUCOSE SERPL-MCNC: 89 MG/DL — SIGNIFICANT CHANGE UP (ref 70–99)
HCT VFR BLD CALC: 35 % — SIGNIFICANT CHANGE UP (ref 34.5–45)
HGB BLD-MCNC: 9.9 G/DL — LOW (ref 11.5–15.5)
HYPOCHROMIA BLD QL: SLIGHT — SIGNIFICANT CHANGE UP
IMM GRANULOCYTES NFR BLD AUTO: 0.2 % — SIGNIFICANT CHANGE UP (ref 0–1.5)
LYMPHOCYTES # BLD AUTO: 2.2 K/UL — SIGNIFICANT CHANGE UP (ref 1–3.3)
LYMPHOCYTES # BLD AUTO: 24.6 % — SIGNIFICANT CHANGE UP (ref 13–44)
LYMPHOCYTES NFR SPEC AUTO: 23 % — SIGNIFICANT CHANGE UP (ref 13–44)
MCHC RBC-ENTMCNC: 19.6 PG — LOW (ref 27–34)
MCHC RBC-ENTMCNC: 28.3 % — LOW (ref 32–36)
MCV RBC AUTO: 69.2 FL — LOW (ref 80–100)
METAMYELOCYTES # FLD: 0 % — SIGNIFICANT CHANGE UP (ref 0–1)
MICROCYTES BLD QL: SIGNIFICANT CHANGE UP
MONOCYTES # BLD AUTO: 1.02 K/UL — HIGH (ref 0–0.9)
MONOCYTES NFR BLD AUTO: 11.4 % — SIGNIFICANT CHANGE UP (ref 2–14)
MONOCYTES NFR BLD: 7.1 % — SIGNIFICANT CHANGE UP (ref 2–9)
MYELOCYTES NFR BLD: 0.9 % — HIGH (ref 0–0)
NEUTROPHIL AB SER-ACNC: 67.2 % — SIGNIFICANT CHANGE UP (ref 43–77)
NEUTROPHILS # BLD AUTO: 5.44 K/UL — SIGNIFICANT CHANGE UP (ref 1.8–7.4)
NEUTROPHILS NFR BLD AUTO: 61 % — SIGNIFICANT CHANGE UP (ref 43–77)
NEUTS BAND # BLD: 0 % — SIGNIFICANT CHANGE UP (ref 0–6)
NRBC # FLD: 0 K/UL — SIGNIFICANT CHANGE UP (ref 0–0)
OTHER - HEMATOLOGY %: 0 — SIGNIFICANT CHANGE UP
OVALOCYTES BLD QL SMEAR: SLIGHT — SIGNIFICANT CHANGE UP
PLATELET # BLD AUTO: 322 K/UL — SIGNIFICANT CHANGE UP (ref 150–400)
PLATELET COUNT - ESTIMATE: NORMAL — SIGNIFICANT CHANGE UP
PMV BLD: SIGNIFICANT CHANGE UP FL (ref 7–13)
POIKILOCYTOSIS BLD QL AUTO: SLIGHT — SIGNIFICANT CHANGE UP
POLYCHROMASIA BLD QL SMEAR: SLIGHT — SIGNIFICANT CHANGE UP
POTASSIUM SERPL-MCNC: 4.5 MMOL/L — SIGNIFICANT CHANGE UP (ref 3.5–5.3)
POTASSIUM SERPL-SCNC: 4.5 MMOL/L — SIGNIFICANT CHANGE UP (ref 3.5–5.3)
PROMYELOCYTES # FLD: 0 % — SIGNIFICANT CHANGE UP (ref 0–0)
PROT SERPL-MCNC: 7.6 G/DL — SIGNIFICANT CHANGE UP (ref 6–8.3)
RBC # BLD: 5.06 M/UL — SIGNIFICANT CHANGE UP (ref 3.8–5.2)
RBC # FLD: 17.7 % — HIGH (ref 10.3–14.5)
SMUDGE CELLS # BLD: PRESENT — SIGNIFICANT CHANGE UP
SODIUM SERPL-SCNC: 136 MMOL/L — SIGNIFICANT CHANGE UP (ref 135–145)
TROPONIN T, HIGH SENSITIVITY: < 6 NG/L — SIGNIFICANT CHANGE UP (ref ?–14)
VARIANT LYMPHS # BLD: 0 % — SIGNIFICANT CHANGE UP
WBC # BLD: 8.93 K/UL — SIGNIFICANT CHANGE UP (ref 3.8–10.5)
WBC # FLD AUTO: 8.93 K/UL — SIGNIFICANT CHANGE UP (ref 3.8–10.5)

## 2019-12-21 PROCEDURE — 99285 EMERGENCY DEPT VISIT HI MDM: CPT

## 2019-12-21 RX ORDER — ASPIRIN/CALCIUM CARB/MAGNESIUM 324 MG
162 TABLET ORAL ONCE
Refills: 0 | Status: COMPLETED | OUTPATIENT
Start: 2019-12-21 | End: 2019-12-21

## 2019-12-21 RX ADMIN — Medication 162 MILLIGRAM(S): at 23:15

## 2019-12-21 NOTE — ED ADULT NURSE NOTE - OBJECTIVE STATEMENT
pt brought into intake 12 A&oX4 amb self care 48 yr old female presents to the ed today for 3 days of constant chest pain. pain is mid epigastric. was seen at  an given mesd with no relief. pt has family hx of HD. 20G Iv placed in left ac labs drawn and sent.

## 2019-12-21 NOTE — ED ADULT NURSE NOTE - CHIEF COMPLAINT QUOTE
Pt c/o "tingling " on the lt side of her face/head/arm and hand [id number] weakness in leg--no facial droop. Dr José saw pt--no code stroke  Pt seen in ER on tuesday for chest pain--pt states it still comes and goes.

## 2019-12-21 NOTE — ED PROVIDER NOTE - OBJECTIVE STATEMENT
47 y/o female with PMHx of HTN presents to ED for L sided chest pain radiating to her back intermittently for a few days. Pt was seen in ED on Tuesday, 5 days ago, had - troponin and was sent home. Pt saw PMD yesterday and had her blood pressure medications changed due to high blood pressure. Pt is also c/o L facial tingling with no focal deficits. Also c/o tingling of her 3rd and 4th fingertips on her L hand. Facial and fingertip tingling symptoms are exactly same as symptoms she experienced a few months ago. At that time a few months ago, pt had CT of head, MRI and neuro f/u showing no concerning findings at that time. Pt has never had a stress test or taken Asprin. Non-smoker. Questionable family history. NKDA.

## 2019-12-21 NOTE — ED ADULT TRIAGE NOTE - CHIEF COMPLAINT QUOTE
Pt c/o "tingling " on the lt side of her face/head/arm and hand [id number]--np weakness in leg--no facial droop. Dr José saw pt--no code stroke  Pt seen in ER on tuesday for chest pain--pt states it still comes and goes.

## 2019-12-21 NOTE — ED PROVIDER NOTE - CLINICAL SUMMARY MEDICAL DECISION MAKING FREE TEXT BOX
49 y/o female with intermittent chest pain with nonspecific story. Pt also with neural complaint, normal neuro exam. Dr Teo Portillo, pt's PMD, not concerned about neuro symptoms. Reports pt had complete workup with no findings. PMD wants to keep pt in hospital for stress. Pt will be placed in CDU. Will give Asprin and repeat EKG. Pt is very agreeable to plan.

## 2019-12-22 VITALS
SYSTOLIC BLOOD PRESSURE: 123 MMHG | HEART RATE: 75 BPM | RESPIRATION RATE: 14 BRPM | TEMPERATURE: 98 F | OXYGEN SATURATION: 100 % | DIASTOLIC BLOOD PRESSURE: 67 MMHG

## 2019-12-22 LAB
CHOLEST SERPL-MCNC: 125 MG/DL — SIGNIFICANT CHANGE UP (ref 120–199)
HBA1C BLD-MCNC: 5.3 % — SIGNIFICANT CHANGE UP (ref 4–5.6)
HDLC SERPL-MCNC: 40 MG/DL — LOW (ref 45–65)
LIPID PNL WITH DIRECT LDL SERPL: 77 MG/DL — SIGNIFICANT CHANGE UP
TRIGL SERPL-MCNC: 89 MG/DL — SIGNIFICANT CHANGE UP (ref 10–149)
TROPONIN T, HIGH SENSITIVITY: < 6 NG/L — SIGNIFICANT CHANGE UP (ref ?–14)

## 2019-12-22 PROCEDURE — 93018 CV STRESS TEST I&R ONLY: CPT | Mod: GC

## 2019-12-22 PROCEDURE — 78452 HT MUSCLE IMAGE SPECT MULT: CPT | Mod: 26

## 2019-12-22 PROCEDURE — 93016 CV STRESS TEST SUPVJ ONLY: CPT | Mod: GC

## 2019-12-22 PROCEDURE — 99236 HOSP IP/OBS SAME DATE HI 85: CPT

## 2019-12-22 RX ORDER — ACETAMINOPHEN 500 MG
975 TABLET ORAL ONCE
Refills: 0 | Status: COMPLETED | OUTPATIENT
Start: 2019-12-22 | End: 2019-12-22

## 2019-12-22 RX ORDER — PANTOPRAZOLE SODIUM 20 MG/1
40 TABLET, DELAYED RELEASE ORAL
Refills: 0 | Status: DISCONTINUED | OUTPATIENT
Start: 2019-12-22 | End: 2019-12-30

## 2019-12-22 RX ORDER — ASPIRIN/CALCIUM CARB/MAGNESIUM 324 MG
81 TABLET ORAL DAILY
Refills: 0 | Status: DISCONTINUED | OUTPATIENT
Start: 2019-12-22 | End: 2019-12-30

## 2019-12-22 RX ADMIN — Medication 975 MILLIGRAM(S): at 04:00

## 2019-12-22 RX ADMIN — Medication 975 MILLIGRAM(S): at 03:01

## 2019-12-22 RX ADMIN — Medication 81 MILLIGRAM(S): at 09:43

## 2019-12-22 NOTE — PROGRESS NOTE ADULT - SUBJECTIVE AND OBJECTIVE BOX
patient seen earlier, s/p negative stress test but with some epigastric discomfort continues  patient with known Asthma, HTN, and obesity  currently on Carine 5/20 mg once daily    O/E:  Vital Signs Last 24 Hrs  T(C): 36.4 (22 Dec 2019 09:44), Max: 37 (21 Dec 2019 19:01)  T(F): 97.5 (22 Dec 2019 09:44), Max: 98.6 (21 Dec 2019 19:01)  HR: 75 (22 Dec 2019 09:44) (62 - 82)  BP: 123/67 (22 Dec 2019 09:44) (103/64 - 135/83)  BP(mean): --  RR: 14 (22 Dec 2019 09:44) (14 - 18)  SpO2: 100% (22 Dec 2019 09:44) (100% - 100%)    cvs: s1s2 regular   rs: CTA bilateral  pa: bs+ glubular  mild epigastric area tenderness  ext: no edema  no calf tenderness     labs noted   stress test negative    A/p:  Atypical chest pain - will follow up with GI work up   discussed with patient to continue taking Omeprazole 40 mg daily and to plan for GI evaluation  HTN stable   Asthma stable   will follow up with patient in my office within one week.

## 2019-12-22 NOTE — ED CDU PROVIDER INITIAL DAY NOTE - ATTENDING CONTRIBUTION TO CARE
49 y/o female with intermittent chest pain with nonspecific story. Pt seen and evlauted for same few days prior and followed up with PMD and BP meds were changed few days ago. Pt also with neurologic complaints with normal neuro exam and previous meuro workup including outpt neuro and MRI all megative. Dr Teo Portillo, pt's PMD, not concerned about neuro symptoms.  PMD wants to keep pt in hospital for stress. Pt will be placed in CDU. Will give Asprin and repeat EKG. Pt is very agreeable to plan.

## 2019-12-22 NOTE — ED CDU PROVIDER INITIAL DAY NOTE - PROGRESS NOTE DETAILS
Patient states her chest pain and L arm pain has significantly improved. Stress test and morning labs were  unremarkable. Will follow up with cardiology outpatient

## 2019-12-22 NOTE — ED CDU PROVIDER DISPOSITION NOTE - PATIENT PORTAL LINK FT
You can access the FollowMyHealth Patient Portal offered by [facility] by registering at the following website: [email or web address]. By joining [employer]’s FollowMyHealth portal, you will also be able to view your health information using other applications (apps) compatible with our system.

## 2019-12-22 NOTE — ED CDU PROVIDER DISPOSITION NOTE - NSFOLLOWUPINSTRUCTIONS_ED_ALL_ED_FT
Take your medications as prescribed.   Follow up with primary care doctor within the week.   Ask primary care for a cardiology referral to follow up with specialist as soon as possible.

## 2019-12-22 NOTE — ED CDU PROVIDER DISPOSITION NOTE - ATTENDING CONTRIBUTION TO CARE
ADmitted to cdu for chest pain. underwent stress test. results WNL. fu cards. concern for acs which was ruled out

## 2019-12-22 NOTE — ED CDU PROVIDER INITIAL DAY NOTE - OBJECTIVE STATEMENT
47 y/o female with PMHx of HTN presents to ED for L sided chest pain radiating to her back intermittently for a few days. Pt was seen in ED on Tuesday, 5 days ago, had - troponin and was sent home. Pt saw PMD yesterday and had her blood pressure medications changed due to high blood pressure. Pt is also c/o L facial tingling with no focal deficits. Also c/o tingling of her 3rd and 4th fingertips on her L hand. Facial and fingertip tingling symptoms are exactly same as symptoms she experienced a few months ago. At that time a few months ago, pt had CT of head, MRI and neuro f/u showing no concerning findings at that time. Pt has never had a stress test or taken Asprin. Non-smoker. Questionable family history. NKDA.    CDU HENRY Stoner Note-----  47 yo female, PMH asthma, HTN, anemia, anxiety, presented to the ED for chest pain as per above.  In the ED, EKG with no acute/ischemic/dynamic findings; trop x 2 each <6; pt dispo'd to CDU for continued care plan:  Tele monitoring, stress test, general observation care / monitoring.  On CDU evaluation, no other c/o.  CDU care plan d/w pt who verbalizes agreement with plan.

## 2019-12-22 NOTE — ED CDU PROVIDER INITIAL DAY NOTE - RESPIRATORY, MLM
History  Chief Complaint   Patient presents with    Psychiatric Evaluation     Patient states he here for evaluation for sucidal thought  States that he attempted to hang himself with a bed sheet  Denies any HI, AH, VH       72-year-old with history of depression presents with suicide ideation  Patient reports that yesterday he tied a sheet around his neck with the intention of committing suicide  He did not go through with this act  Reports multiple prior suicide attempts as well  Also reports having access to guns in his house  Prior to Admission Medications   Prescriptions Last Dose Informant Patient Reported? Taking? buprenorphine-naloxone (SUBOXONE) 8-2 mg per SL tablet Past Week at Unknown time  Yes Yes   Sig: Place 1 tablet under the tongue daily   cloNIDine (CATAPRES) 0 3 mg tablet 1/16/2018 at Unknown time  Yes Yes   Sig: Take 0 3 mg by mouth 2 (two) times a day   gabapentin (NEURONTIN) 800 mg tablet Past Week at Unknown time  Yes Yes   Sig: Take 800 mg by mouth 3 (three) times a day   methocarbamol (ROBAXIN) 750 mg tablet 1/16/2018 at Unknown time  Yes Yes   Sig: Take 750 mg by mouth every 4 (four) hours Take two tablets by mouth every 4 hours      Facility-Administered Medications: None       Past Medical History:   Diagnosis Date    Muscle spasm     Sciatica     Scoliosis        Past Surgical History:   Procedure Laterality Date    WISDOM TOOTH EXTRACTION         History reviewed  No pertinent family history  I have reviewed and agree with the history as documented  Social History   Substance Use Topics    Smoking status: Current Every Day Smoker    Smokeless tobacco: Never Used    Alcohol use No        Review of Systems   Constitutional: Negative for chills and fever  HENT: Negative for congestion and sore throat  Eyes: Negative for pain and redness  Respiratory: Negative for shortness of breath and wheezing  Cardiovascular: Negative for chest pain and palpitations  Gastrointestinal: Negative for abdominal pain, diarrhea and vomiting  Endocrine: Negative for polydipsia and polyphagia  Genitourinary: Negative for dysuria and flank pain  Musculoskeletal: Negative for arthralgias and back pain  Skin: Negative for rash and wound  Neurological: Negative for seizures and headaches  Psychiatric/Behavioral: Positive for suicidal ideas  Negative for agitation and behavioral problems  All other systems reviewed and are negative  Physical Exam  ED Triage Vitals   Temperature Pulse Respirations Blood Pressure SpO2   01/16/18 1349 01/16/18 1349 01/16/18 1819 01/16/18 1349 01/16/18 1349   98 °F (36 7 °C) 56 18 116/60 97 %      Temp Source Heart Rate Source Patient Position - Orthostatic VS BP Location FiO2 (%)   01/16/18 1349 01/16/18 1819 01/16/18 1349 01/16/18 1349 --   Tympanic Monitor Sitting Right arm       Pain Score       --                  Orthostatic Vital Signs  Vitals:    01/16/18 1349 01/16/18 1819   BP: 116/60 109/61   Pulse: 56 61   Patient Position - Orthostatic VS: Sitting Sitting       Physical Exam   Constitutional: He is oriented to person, place, and time  He appears well-developed and well-nourished  No distress  HENT:   Head: Normocephalic and atraumatic  Right Ear: External ear normal    Left Ear: External ear normal    Mouth/Throat: Oropharynx is clear and moist    Eyes: Conjunctivae and EOM are normal  Pupils are equal, round, and reactive to light  Neck: Normal range of motion  Neck supple  No thyromegaly present  Cardiovascular: Normal rate, regular rhythm and normal heart sounds  No murmur heard  Pulmonary/Chest: Breath sounds normal  No respiratory distress  He has no wheezes  Abdominal: Soft  He exhibits no distension  There is no tenderness  There is no rebound and no guarding  Musculoskeletal: Normal range of motion  He exhibits no deformity  Neurological: He is alert and oriented to person, place, and time   No cranial nerve deficit  Skin: Skin is warm  He is not diaphoretic  No erythema  Psychiatric: His behavior is normal    Flat affect    Nursing note and vitals reviewed  ED Medications  Medications - No data to display    Diagnostic Studies  Results Reviewed     Procedure Component Value Units Date/Time    Rapid drug screen, urine [[id number]]  (Normal) Collected:  01/16/18 1700    Lab Status:  Final result Specimen:  Urine from Urine, Clean Catch Updated:  01/16/18 1735     Amph/Meth UR Negative     Barbiturate Ur Negative     Benzodiazepine Urine Negative     Cocaine Urine Negative     Methadone Urine Negative     Opiate Urine Negative     PCP Ur Negative     THC Urine Negative    Narrative:         FOR MEDICAL PURPOSES ONLY  IF CONFIRMATION NEEDED PLEASE CONTACT THE LAB WITHIN 5 DAYS      Drug Screen Cutoff Levels:  AMPHETAMINE/METHAMPHETAMINES  1000 ng/mL  BARBITURATES     200 ng/mL  BENZODIAZEPINES     200 ng/mL  COCAINE      300 ng/mL  METHADONE      300 ng/mL  OPIATES      300 ng/mL  PHENCYCLIDINE     25 ng/mL  THC       50 ng/mL    POCT alcohol breath test [[id number]]  (Normal) Resulted:  01/16/18 1649    Lab Status:  Final result Updated:  01/16/18 1649     EXTBreath Alcohol 0 000                 No orders to display         Procedures  Procedures      Phone Consults  ED Phone Contact    ED Course  ED Course                                MDM  Number of Diagnoses or Management Options  Diagnosis management comments: Impression:  Suicidal ideation with plan  Plan:  Blood alcohol, UDS, consult crisis with admission    CritCare Time    Disposition  Final diagnoses:   Depression with suicidal ideation     Time reflects when diagnosis was documented in both MDM as applicable and the Disposition within this note     Time User Action Codes Description Comment    1/16/2018  6:44 PM Peyton Connolly [F32 9] Depression     1/16/2018  7:21 PM Russel Reyna [F32 9,  [id number]] Depression with suicidal ideation ED Disposition     ED Disposition Condition Comment    Transfer to Another Facility  Jordyn Davis transfer to [facility]   Accepting Physician  Dr Grady    Accepting Facility Name, [facility]   [facility]   Sending MD  Dr Hesham Andre      RN Documentation    [address] Name, [address]   [id number]      Follow-up Information     Follow up With Specialties Details Why Contact Info    Arabella Hudson MD Family Medicine   [address] [place] [address] [address] [place] [address]  [phone]          Patient's Medications   Discharge Prescriptions    No medications on file     No discharge procedures on file  ED Provider  Attending physically available and evaluated Jordyn Davis  LEONEL managed the patient along with the ED Attending      Electronically Signed by         Laureano Law MD  01/16/18 1016 Breath sounds clear and equal bilaterally.

## 2020-03-15 ENCOUNTER — EMERGENCY (EMERGENCY)
Facility: HOSPITAL | Age: 50
LOS: 1 days | Discharge: HOME | End: 2020-03-15
Admitting: EMERGENCY MEDICINE
Payer: COMMERCIAL

## 2020-03-15 VITALS
SYSTOLIC BLOOD PRESSURE: 140 MMHG | OXYGEN SATURATION: 100 % | HEART RATE: 82 BPM | TEMPERATURE: 98 F | RESPIRATION RATE: 16 BRPM | DIASTOLIC BLOOD PRESSURE: 70 MMHG

## 2020-03-15 VITALS
DIASTOLIC BLOOD PRESSURE: 70 MMHG | OXYGEN SATURATION: 100 % | SYSTOLIC BLOOD PRESSURE: 124 MMHG | HEART RATE: 66 BPM | TEMPERATURE: 98 F | RESPIRATION RATE: 16 BRPM

## 2020-03-15 PROCEDURE — 99284 EMERGENCY DEPT VISIT MOD MDM: CPT

## 2020-03-15 RX ORDER — KETOROLAC TROMETHAMINE 30 MG/ML
30 SYRINGE (ML) INJECTION ONCE
Refills: 0 | Status: DISCONTINUED | OUTPATIENT
Start: 2020-03-15 | End: 2020-03-15

## 2020-03-15 RX ADMIN — Medication 30 MILLIGRAM(S): at 23:52

## 2020-03-15 NOTE — ED PROVIDER NOTE - OBJECTIVE STATEMENT
48 y/o female PMH HTN presents to ER for chest pain. Pt. sattes 4-5 days ago developed URI symtoms - sore throat, congestion and dry cough - patient states that yesterday started to develop chest pain as well. Pt. says URI symptoms have improved but still present. Pt. states that chest pain started yesterday afternoon and has been present since - pain is at rest and is not worse with movement. Denies fever chills sob nausea vomit weakness dizziness.

## 2020-03-15 NOTE — ED PROVIDER NOTE - PATIENT PORTAL LINK FT
You can access the FollowMyHealth Patient Portal offered by [facility] by registering at the following website: [email or web address]. By joining [employer]’s FollowMyHealth portal, you will also be able to view your health information using other applications (apps) compatible with our system.

## 2020-03-15 NOTE — ED PROVIDER NOTE - NSFOLLOWUPINSTRUCTIONS_ED_ALL_ED_FT
REST, NO STRENUOUS ACTIVITY  CONTINUE TAKING ANY CURRENT MEDICATIONS  TAKE ANY NEW MEDICATIONS AS DIRECTED  **FOLLOW UP WITH REGULAR DOCTOR IN 3-5 DAYS**  RETURN TO ER FOR WORSENING SYMPTOMS

## 2020-03-15 NOTE — ED ADULT NURSE NOTE - CHIEF COMPLAINT QUOTE
pt c/o cough, stuffy, nose, sore throat since Tuesday and now c/o midsternal/epigastric chest pain radiating to the back, since Friday, more constant today since 3pm. pt did not take anything for the pain. PMH- Asthma, HTN, Anxiety.

## 2020-03-15 NOTE — ED ADULT NURSE NOTE - OBJECTIVE STATEMENT
diamante RN: pt received in intake A&OX3 c/o nasal congestion, cough, sore throat since Tuesday, now states since Friday developed epigastric/midsternal chest pain radiating to back. Pt states she had SOB, but has history of asthma, has been taking inhaler with relief. Denies fevers, N/V/D. Resp even and unlabored. 20g iv placed to R hand. labs drawn and sent. will continue to monitor.

## 2020-03-15 NOTE — ED PROVIDER NOTE - CLINICAL SUMMARY MEDICAL DECISION MAKING FREE TEXT BOX
50 y/o female c/o chest pain and uri symptoms  -probable viral syndrome (low risk for covid19) r/o ACS  -cbc cmp trop cxr ekg  -reassess

## 2020-03-16 ENCOUNTER — APPOINTMENT (OUTPATIENT)
Dept: CARDIOLOGY | Facility: CLINIC | Age: 50
End: 2020-03-16
Payer: COMMERCIAL

## 2020-03-16 ENCOUNTER — NON-APPOINTMENT (OUTPATIENT)
Age: 50
End: 2020-03-16

## 2020-03-16 VITALS
SYSTOLIC BLOOD PRESSURE: 123 MMHG | DIASTOLIC BLOOD PRESSURE: 81 MMHG | BODY MASS INDEX: 33.49 KG/M2 | HEART RATE: 69 BPM | OXYGEN SATURATION: 100 % | HEIGHT: 62 IN | WEIGHT: 182 LBS

## 2020-03-16 DIAGNOSIS — H53.8 OTHER VISUAL DISTURBANCES: ICD-10-CM

## 2020-03-16 LAB
ALBUMIN SERPL ELPH-MCNC: 3.9 G/DL — SIGNIFICANT CHANGE UP (ref 3.3–5)
ALP SERPL-CCNC: 83 U/L — SIGNIFICANT CHANGE UP (ref 40–120)
ALT FLD-CCNC: 17 U/L — SIGNIFICANT CHANGE UP (ref 4–33)
ANION GAP SERPL CALC-SCNC: 11 MMO/L — SIGNIFICANT CHANGE UP (ref 7–14)
AST SERPL-CCNC: 13 U/L — SIGNIFICANT CHANGE UP (ref 4–32)
BASOPHILS # BLD AUTO: 0.03 K/UL — SIGNIFICANT CHANGE UP (ref 0–0.2)
BASOPHILS NFR BLD AUTO: 0.3 % — SIGNIFICANT CHANGE UP (ref 0–2)
BILIRUB SERPL-MCNC: 0.2 MG/DL — SIGNIFICANT CHANGE UP (ref 0.2–1.2)
BUN SERPL-MCNC: 13 MG/DL — SIGNIFICANT CHANGE UP (ref 7–23)
CALCIUM SERPL-MCNC: 9.7 MG/DL — SIGNIFICANT CHANGE UP (ref 8.4–10.5)
CHLORIDE SERPL-SCNC: 100 MMOL/L — SIGNIFICANT CHANGE UP (ref 98–107)
CO2 SERPL-SCNC: 22 MMOL/L — SIGNIFICANT CHANGE UP (ref 22–31)
CREAT SERPL-MCNC: 0.72 MG/DL — SIGNIFICANT CHANGE UP (ref 0.5–1.3)
EOSINOPHIL # BLD AUTO: 0.33 K/UL — SIGNIFICANT CHANGE UP (ref 0–0.5)
EOSINOPHIL NFR BLD AUTO: 3.3 % — SIGNIFICANT CHANGE UP (ref 0–6)
GLUCOSE SERPL-MCNC: 85 MG/DL — SIGNIFICANT CHANGE UP (ref 70–99)
HCT VFR BLD CALC: 33.3 % — LOW (ref 34.5–45)
HGB BLD-MCNC: 9.6 G/DL — LOW (ref 11.5–15.5)
IMM GRANULOCYTES NFR BLD AUTO: 0.3 % — SIGNIFICANT CHANGE UP (ref 0–1.5)
LYMPHOCYTES # BLD AUTO: 2.11 K/UL — SIGNIFICANT CHANGE UP (ref 1–3.3)
LYMPHOCYTES # BLD AUTO: 20.8 % — SIGNIFICANT CHANGE UP (ref 13–44)
MCHC RBC-ENTMCNC: 19.3 PG — LOW (ref 27–34)
MCHC RBC-ENTMCNC: 28.8 % — LOW (ref 32–36)
MCV RBC AUTO: 66.9 FL — LOW (ref 80–100)
MONOCYTES # BLD AUTO: 0.97 K/UL — HIGH (ref 0–0.9)
MONOCYTES NFR BLD AUTO: 9.6 % — SIGNIFICANT CHANGE UP (ref 2–14)
NEUTROPHILS # BLD AUTO: 6.68 K/UL — SIGNIFICANT CHANGE UP (ref 1.8–7.4)
NEUTROPHILS NFR BLD AUTO: 65.7 % — SIGNIFICANT CHANGE UP (ref 43–77)
NRBC # FLD: 0 K/UL — SIGNIFICANT CHANGE UP (ref 0–0)
PLATELET # BLD AUTO: 330 K/UL — SIGNIFICANT CHANGE UP (ref 150–400)
PMV BLD: SIGNIFICANT CHANGE UP FL (ref 7–13)
POTASSIUM SERPL-MCNC: 3.7 MMOL/L — SIGNIFICANT CHANGE UP (ref 3.5–5.3)
POTASSIUM SERPL-SCNC: 3.7 MMOL/L — SIGNIFICANT CHANGE UP (ref 3.5–5.3)
PROT SERPL-MCNC: 8 G/DL — SIGNIFICANT CHANGE UP (ref 6–8.3)
RBC # BLD: 4.98 M/UL — SIGNIFICANT CHANGE UP (ref 3.8–5.2)
RBC # FLD: 17.8 % — HIGH (ref 10.3–14.5)
SODIUM SERPL-SCNC: 133 MMOL/L — LOW (ref 135–145)
TROPONIN T, HIGH SENSITIVITY: < 6 NG/L — SIGNIFICANT CHANGE UP (ref ?–14)
WBC # BLD: 10.15 K/UL — SIGNIFICANT CHANGE UP (ref 3.8–10.5)
WBC # FLD AUTO: 10.15 K/UL — SIGNIFICANT CHANGE UP (ref 3.8–10.5)

## 2020-03-16 PROCEDURE — 71045 X-RAY EXAM CHEST 1 VIEW: CPT | Mod: 26

## 2020-03-16 PROCEDURE — 99204 OFFICE O/P NEW MOD 45 MIN: CPT

## 2020-03-16 PROCEDURE — 93000 ELECTROCARDIOGRAM COMPLETE: CPT

## 2020-03-16 RX ORDER — AMLODIPINE BESYLATE 2.5 MG/1
2.5 TABLET ORAL
Refills: 0 | Status: DISCONTINUED | COMMUNITY
End: 2020-03-16

## 2020-03-16 RX ORDER — DICLOFENAC SODIUM 75 MG/1
1 TABLET, DELAYED RELEASE ORAL
Qty: 10 | Refills: 0
Start: 2020-03-16 | End: 2020-03-20

## 2020-03-16 RX ORDER — FAMOTIDINE 10 MG/ML
1 INJECTION INTRAVENOUS
Qty: 7 | Refills: 0
Start: 2020-03-16 | End: 2020-03-22

## 2020-03-16 RX ORDER — IRON/IRON ASP GLY/FA/MV-MIN 38 125-25-1MG
TABLET ORAL
Refills: 0 | Status: ACTIVE | COMMUNITY

## 2020-03-16 RX ADMIN — Medication 30 MILLIGRAM(S): at 01:19

## 2020-05-26 NOTE — HISTORY OF PRESENT ILLNESS
[FreeTextEntry1] : Chest pain since June of last year. Multiple visits to ED. \par Sharp around xiphoid. Can go to the back. Sometimes under the left breast. \par Comes if she moves around a lot. When she gets on the treadmill she feels tightness but then goes away if she continues\par Associated with nausea, dizzy, blurry vision. \par No worse with deep breath.

## 2020-05-26 NOTE — PHYSICAL EXAM
[General Appearance - Well Developed] : well developed [Well Groomed] : well groomed [Normal Appearance] : normal appearance [General Appearance - Well Nourished] : well nourished [No Deformities] : no deformities [General Appearance - In No Acute Distress] : no acute distress [Normal Conjunctiva] : the conjunctiva exhibited no abnormalities [Normal Oral Mucosa] : normal oral mucosa [Eyelids - No Xanthelasma] : the eyelids demonstrated no xanthelasmas [No Oral Pallor] : no oral pallor [No Oral Cyanosis] : no oral cyanosis [Normal Jugular Venous A Waves Present] : normal jugular venous A waves present [No Jugular Venous Wan A Waves] : no jugular venous wan A waves [Normal Jugular Venous V Waves Present] : normal jugular venous V waves present [Heart Rate And Rhythm] : heart rate and rhythm were normal [Heart Sounds] : normal S1 and S2 [Murmurs] : no murmurs present [Exaggerated Use Of Accessory Muscles For Inspiration] : no accessory muscle use [Respiration, Rhythm And Depth] : normal respiratory rhythm and effort [Auscultation Breath Sounds / Voice Sounds] : lungs were clear to auscultation bilaterally [Abdomen Soft] : soft [Abdomen Tenderness] : non-tender [Abdomen Mass (___ Cm)] : no abdominal mass palpated [Abnormal Walk] : normal gait [Gait - Sufficient For Exercise Testing] : the gait was sufficient for exercise testing [Cyanosis, Localized] : no localized cyanosis [Nail Clubbing] : no clubbing of the fingernails [Petechial Hemorrhages (___cm)] : no petechial hemorrhages [Skin Color & Pigmentation] : normal skin color and pigmentation [] : no rash [No Venous Stasis] : no venous stasis [Skin Lesions] : no skin lesions [No Skin Ulcers] : no skin ulcer [No Xanthoma] : no  xanthoma was observed [Oriented To Time, Place, And Person] : oriented to person, place, and time [Affect] : the affect was normal [Mood] : the mood was normal [No Anxiety] : not feeling anxious

## 2020-06-19 ENCOUNTER — APPOINTMENT (OUTPATIENT)
Dept: CARDIOLOGY | Facility: CLINIC | Age: 50
End: 2020-06-19

## 2020-07-05 ENCOUNTER — APPOINTMENT (OUTPATIENT)
Dept: MRI IMAGING | Facility: CLINIC | Age: 50
End: 2020-07-05
Payer: MEDICAID

## 2020-07-05 ENCOUNTER — OUTPATIENT (OUTPATIENT)
Dept: OUTPATIENT SERVICES | Facility: HOSPITAL | Age: 50
LOS: 1 days | End: 2020-07-05
Payer: MEDICAID

## 2020-07-05 ENCOUNTER — RESULT REVIEW (OUTPATIENT)
Age: 50
End: 2020-07-05

## 2020-07-05 DIAGNOSIS — Q04.6 CONGENITAL CEREBRAL CYSTS: ICD-10-CM

## 2020-07-05 PROCEDURE — 70551 MRI BRAIN STEM W/O DYE: CPT | Mod: 26

## 2020-07-05 PROCEDURE — 70551 MRI BRAIN STEM W/O DYE: CPT

## 2020-07-09 ENCOUNTER — OUTPATIENT (OUTPATIENT)
Dept: OUTPATIENT SERVICES | Facility: HOSPITAL | Age: 50
LOS: 1 days | End: 2020-07-09
Payer: MEDICAID

## 2020-07-09 ENCOUNTER — APPOINTMENT (OUTPATIENT)
Dept: CARDIOLOGY | Facility: CLINIC | Age: 50
End: 2020-07-09

## 2020-07-09 DIAGNOSIS — R07.9 CHEST PAIN, UNSPECIFIED: ICD-10-CM

## 2020-07-09 PROCEDURE — 75574 CT ANGIO HRT W/3D IMAGE: CPT | Mod: 26

## 2020-07-09 PROCEDURE — 75574 CT ANGIO HRT W/3D IMAGE: CPT

## 2020-07-21 ENCOUNTER — APPOINTMENT (OUTPATIENT)
Dept: NEUROSURGERY | Facility: CLINIC | Age: 50
End: 2020-07-21
Payer: MEDICAID

## 2020-07-21 PROCEDURE — 99212 OFFICE O/P EST SF 10 MIN: CPT | Mod: 95

## 2020-07-21 NOTE — PHYSICAL EXAM
[General Appearance - Alert] : alert [General Appearance - Well Nourished] : well nourished [General Appearance - In No Acute Distress] : in no acute distress [General Appearance - Well Developed] : well developed [General Appearance - Well-Appearing] : healthy appearing [] : normal voice and communication [Oriented To Time, Place, And Person] : oriented to person, place, and time [Memory Recent] : recent memory was not impaired [Affect] : the affect was normal [Impaired Insight] : insight and judgment were intact [Person] : oriented to person [Place] : oriented to place [Time] : oriented to time [Exaggerated Use Of Accessory Muscles For Inspiration] : no accessory muscle use

## 2020-07-21 NOTE — CONSULT LETTER
[Consult Letter:] : I had the pleasure of evaluating your patient, [unfilled]. [Dear  ___] : Dear  [unfilled], [Please see my note below.] : Please see my note below. [Consult Closing:] : Thank you very much for allowing me to participate in the care of this patient.  If you have any questions, please do not hesitate to contact me. [FreeTextEntry3] : Kameron Rodney MD, FACS, FAANS\par Professor and \par Department of Neurosurgery\par [facility]\par [address] [place] [address], [address] [place] [address]\[place] [address]\par [phone] Clinical\par [phone] Academic\par  [Sincerely,] : Sincerely, [FreeTextEntry2] : Roxy Ware MD \[address] \[place] [id number]

## 2020-07-21 NOTE — ASSESSMENT
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 Advised patient to see with Dr. Rodrigo Mcginnis or Dr. [facility]\par 2. MRI brain w/wo in 1 year.\par 3. F/U after MRI.\par \par Kameron Rodney MD, FACS, FAANS\par Professor and \par Department of Neurosurgery\par [facility] [employer] [facility]\par [address] [facility], [address] [place] [address]\[place] [address]\par [phone] Clinical\par [phone] Academic\par

## 2020-07-21 NOTE — DATA REVIEWED
[de-identified] : EXAM: MR BRAIN    PROCEDURE DATE: 07/05/2020     INTERPRETATION: Clinical indication: Dizziness headaches and  lightheadedness.   MRI of the brain was performed using sagittal T1, axial T1 and fast spin  echo T2-weighted sequence with FLAIR diffusion and susceptibility weighted  sequence.   This exam is compared with prior contrast enhanced brain MRI performed on  June 22, 2019 and head CT performed on June 22, 2019.   The size and configuration the ventricles appear unchanged from   Well-defined lesion is again seen anterior to the third ventricle. This  lesion is isoattenuated to the parenchyma on T1 and demonstrates mild  decreased signal on the T2-weighted sequence. This corresponds to patient's  known colloid cyst. This finding measures approximately 0.5 x 0.6 cm and  previously measured approximately 0.6 x 0.5 cm.   There is no acute hemorrhage in the posterior fossa or supratentorial region.   Evaluation of the diffusion weighted sequence demonstrates no abnormal areas  of restricted diffusion to suggest acute infarct.   The large vessels demonstrate normal flow voids.   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   Visualized paranasal sinuses and mastoid and middle ear regions appear clear.   IMPRESSION: Stable exam.           ZOHRA RAY M.D., ATTENDING RADIOLOGIST  This document has been electronically signed. Jul 6 2020 9:14AM

## 2020-07-21 NOTE — HISTORY OF PRESENT ILLNESS
[Home] : at home, [unfilled] , at the time of the visit. [Medical Office: ([facility])___] : at the medical office located in  [Verbal consent obtained from patient] : the patient, [unfilled] [FreeTextEntry1] : Te Zuluaga is a pleasant right handed 49 year old lady of  ([place]) descent who first  presented to me for a consultation in 7/2019 regarding a small colloid cyst that was found incidentally on MRI brain done during an ER visit at [facility]. The symptoms did not seem to be related to the colloid cyst and it was decided to follow her with serial MRIs.This is her 1 year follow-up.\par \par Today, she reports headaches about once every 2 weeks.  Headaches are 7/10 and associated with nausea and blurry vision and dizziness.   Headaches are gradually relieved with Tylenol prn.\par \par Today she is participating in a telehealth visit to review results of MRI brain done 7/5/20.\par \par She is currently under the care of her PCP Dr. Roxy Ware, [address] [place], [place] [id number]. [phone].\par \par PMHX includes HTN, asthma, anxiety disorder, social anxiety \par \par Social history,  with 1 daughter (6 years old). \par \par Telehealth visit started at 4:50 pm - 5:01 pm.\par

## 2021-02-05 ENCOUNTER — APPOINTMENT (OUTPATIENT)
Dept: PAIN MANAGEMENT | Facility: CLINIC | Age: 51
End: 2021-02-05
Payer: MEDICAID

## 2021-02-05 VITALS — TEMPERATURE: 98 F

## 2021-02-05 VITALS
HEIGHT: 62 IN | HEART RATE: 90 BPM | SYSTOLIC BLOOD PRESSURE: 126 MMHG | DIASTOLIC BLOOD PRESSURE: 82 MMHG | WEIGHT: 178 LBS | BODY MASS INDEX: 32.76 KG/M2

## 2021-02-05 DIAGNOSIS — G93.2 BENIGN INTRACRANIAL HYPERTENSION: ICD-10-CM

## 2021-02-05 PROCEDURE — 99072 ADDL SUPL MATRL&STAF TM PHE: CPT

## 2021-02-05 PROCEDURE — 99204 OFFICE O/P NEW MOD 45 MIN: CPT

## 2021-02-08 NOTE — ASSESSMENT
[FreeTextEntry1] : Pt with chronic daily headache with migrainous features but with colloid cyst on imaging.\par Would obtain opthomological evaluation with dilated exam and fundoscopy.\par will give trial of prn rizatriptan, prn meloxicam together or separately\par hold on prevention for now but may consider use of topiramate.

## 2021-02-08 NOTE — PHYSICAL EXAM
[General Appearance - Alert] : alert [General Appearance - Well Developed] : well developed [General Appearance - Well Nourished] : well nourished [Oriented To Time, Place, And Person] : oriented to person, place, and time [Impaired Insight] : insight and judgment were intact [Memory Recent] : recent memory was not impaired [Memory Remote] : remote memory was not impaired [Person] : oriented to person [Place] : oriented to place [Time] : oriented to time [Short Term Intact] : short term memory intact [Remote Intact] : remote memory intact [Registration Intact] : recent registration memory intact [Concentration Intact] : normal concentrating ability [Visual Intact] : visual attention was ~T not ~L decreased [Naming Objects] : no difficulty naming common objects [Writing A Sentence] : no difficulty writing a sentence [Fluency] : fluency intact [Comprehension] : comprehension intact [Reading] : reading intact [Current Events] : adequate knowledge of current events [Past History] : adequate knowledge of personal past history [Cranial Nerves Oculomotor (III)] : extraocular motion intact [Cranial Nerves Facial (VII)] : face symmetrical [Cranial Nerves Vestibulocochlear (VIII)] : hearing was intact bilaterally [Cranial Nerves Accessory (XI - Cranial And Spinal)] : head turning and shoulder shrug symmetric [Cranial Nerves Hypoglossal (XII)] : there was no tongue deviation with protrusion [No Muscle Atrophy] : normal bulk in all four extremities [Motor Strength Upper Extremities Bilaterally] : strength was normal in both upper extremities [Motor Handedness Right-Handed] : the patient is right hand dominant [Sensation Tactile Decrease] : light touch was intact [Allodynia] : no ~T allodynia present [Past-pointing] : there was no past-pointing [Tremor] : no tremor present [Dysdiadochokinesia Bilaterally] : not present [Coordination - Dysmetria Impaired Finger-to-Nose Bilateral] : not present [Sclera] : the sclera and conjunctiva were normal [No NGUYEN] : no internuclear ophthalmoplegia [Strabismus] : no strabismus was seen [Outer Ear] : the ears and nose were normal in appearance [Neck Cervical Mass (___cm)] : no neck mass was observed [Exaggerated Use Of Accessory Muscles For Inspiration] : no accessory muscle use [Abnormal Walk] : normal gait [Involuntary Movements] : no involuntary movements were seen [Nail Clubbing] : no clubbing  or cyanosis of the fingernails [Skin Color & Pigmentation] : normal skin color and pigmentation [] : no rash

## 2021-02-08 NOTE — HISTORY OF PRESENT ILLNESS
[Chronic Headache] : chronic headache [Dizziness] : dizziness [Nausea] : nausea [Vomiting] : Vomiting [Phonophobia] : phonophobia [Scalp Tenderness] : scalp tenderness [FreeTextEntry1] : Pt is a 49 yo woman who notes that she had 1st onset of dizziness and headaches in July of 2019.  Had continued to have progressive headaches and had imaging done which showed a colloid cyst of 3rd ventricle with no clear pressure change or positional component to her headache.\par Headaches starts in temple, more on left side.  Tends to be throbbing or sharp piercing "knifelike" pain.  Pain will stay on left side, burning or crampy "like stuff moving."  Will occasionally go to right side but 90% + on left.  Pian can be in neck and shoulder and down in to back.  \par No worsening with headache with bending.  Some dizziness with moving around.  \par Last severe episode as 2 weeks at beginning of January.  had dizziness/ nausea a vomiting with this.  WOuld use tylenol 1000mg but wouldn't improve.\par Could last 2 weeks in length.\par That last episode was sudden\par Does have anxiety and panic attacks.  Was given paroxetine and alprazolam.  That was started in Dec and had felt worse with the treatment.  [place] shaky "racy" and couldn't sleep.  Stopped paroxetine in beginning of January.\par Mother with likely migraine events with dizziness and nausea.\par Does get headaches with smell or sound but no light sensitivity.\par Does get some blurry vision.\par \par \par  [Photophobia] : no photophobia

## 2021-02-08 NOTE — REVIEW OF SYSTEMS
[Fever] : no fever [Chills] : no chills [Feeling Poorly] : feeling poorly [Feeling Tired] : not feeling tired [Recent Weight Gain (___ Lbs)] : no recent weight gain [Recent Weight Loss (___ Lbs)] : no recent weight loss [Eye Pain] : eye pain [Loss Of Hearing] : no hearing loss [Nasal Discharge] : no nasal discharge [Chest Pain] : chest pain [Palpitations] : no palpitations [Shortness Of Breath] : no shortness of breath [Cough] : no cough [Arthralgias] : arthralgias [Neck Pain] : neck pain [Skin Lesions] : no skin lesions [Skin Wound] : no skin wound [Itching] : no itching [As Noted in HPI] : as noted in HPI [Convulsions] : no convulsions [Fainting] : no fainting [Sleep Disturbances] : sleep disturbances [Muscle Weakness] : no muscle weakness [Swollen Glands] : no swollen glands [Swollen Glands In The Neck] : no swollen glands in the neck

## 2021-03-25 ENCOUNTER — EMERGENCY (EMERGENCY)
Facility: HOSPITAL | Age: 51
LOS: 1 days | Discharge: HOME | End: 2021-03-25
Attending: EMERGENCY MEDICINE | Admitting: EMERGENCY MEDICINE
Payer: MEDICAID

## 2021-03-25 VITALS
HEIGHT: 63 IN | HEART RATE: 92 BPM | TEMPERATURE: 98 F | DIASTOLIC BLOOD PRESSURE: 85 MMHG | OXYGEN SATURATION: 97 % | RESPIRATION RATE: 18 BRPM | SYSTOLIC BLOOD PRESSURE: 128 MMHG

## 2021-03-25 PROCEDURE — 93010 ELECTROCARDIOGRAM REPORT: CPT

## 2021-03-25 PROCEDURE — 99284 EMERGENCY DEPT VISIT MOD MDM: CPT | Mod: 25

## 2021-03-25 RX ORDER — FAMOTIDINE 10 MG/ML
20 INJECTION INTRAVENOUS ONCE
Refills: 0 | Status: COMPLETED | OUTPATIENT
Start: 2021-03-25 | End: 2021-03-25

## 2021-03-25 RX ADMIN — FAMOTIDINE 20 MILLIGRAM(S): 10 INJECTION INTRAVENOUS at 23:50

## 2021-03-25 RX ADMIN — Medication 30 MILLILITER(S): at 23:50

## 2021-03-25 NOTE — ED ADULT TRIAGE NOTE - CHIEF COMPLAINT QUOTE
Patient received one dose of Pfizer covid vaccine yesterday, she started experiencing epigastric pain, headache and nausea today and went to urgent care.  Abnormal EKG at urgent care and given Aspirin 324mg, EMS called.  After receiving vaccine yesterday. patient notified her  tested positive for Covid.

## 2021-03-26 VITALS
RESPIRATION RATE: 18 BRPM | OXYGEN SATURATION: 100 % | DIASTOLIC BLOOD PRESSURE: 70 MMHG | TEMPERATURE: 98 F | SYSTOLIC BLOOD PRESSURE: 126 MMHG | HEART RATE: 76 BPM

## 2021-03-26 LAB
ALBUMIN SERPL ELPH-MCNC: 3.6 G/DL — SIGNIFICANT CHANGE UP (ref 3.3–5)
ALP SERPL-CCNC: 80 U/L — SIGNIFICANT CHANGE UP (ref 40–120)
ALT FLD-CCNC: 14 U/L — SIGNIFICANT CHANGE UP (ref 4–33)
ANION GAP SERPL CALC-SCNC: 12 MMOL/L — SIGNIFICANT CHANGE UP (ref 7–14)
AST SERPL-CCNC: 15 U/L — SIGNIFICANT CHANGE UP (ref 4–32)
BILIRUB SERPL-MCNC: 0.2 MG/DL — SIGNIFICANT CHANGE UP (ref 0.2–1.2)
BUN SERPL-MCNC: 11 MG/DL — SIGNIFICANT CHANGE UP (ref 7–23)
CALCIUM SERPL-MCNC: 9.1 MG/DL — SIGNIFICANT CHANGE UP (ref 8.4–10.5)
CHLORIDE SERPL-SCNC: 105 MMOL/L — SIGNIFICANT CHANGE UP (ref 98–107)
CO2 SERPL-SCNC: 20 MMOL/L — LOW (ref 22–31)
CREAT SERPL-MCNC: 0.59 MG/DL — SIGNIFICANT CHANGE UP (ref 0.5–1.3)
GLUCOSE SERPL-MCNC: 95 MG/DL — SIGNIFICANT CHANGE UP (ref 70–99)
HCT VFR BLD CALC: 41 % — SIGNIFICANT CHANGE UP (ref 34.5–45)
HGB BLD-MCNC: 12.7 G/DL — SIGNIFICANT CHANGE UP (ref 11.5–15.5)
MCHC RBC-ENTMCNC: 24.3 PG — LOW (ref 27–34)
MCHC RBC-ENTMCNC: 31 GM/DL — LOW (ref 32–36)
MCV RBC AUTO: 78.5 FL — LOW (ref 80–100)
NRBC # BLD: 0 /100 WBCS — SIGNIFICANT CHANGE UP
NRBC # FLD: 0 K/UL — SIGNIFICANT CHANGE UP
PLATELET # BLD AUTO: 210 K/UL — SIGNIFICANT CHANGE UP (ref 150–400)
POTASSIUM SERPL-MCNC: 4 MMOL/L — SIGNIFICANT CHANGE UP (ref 3.5–5.3)
POTASSIUM SERPL-SCNC: 4 MMOL/L — SIGNIFICANT CHANGE UP (ref 3.5–5.3)
PROT SERPL-MCNC: 7.6 G/DL — SIGNIFICANT CHANGE UP (ref 6–8.3)
RBC # BLD: 5.22 M/UL — HIGH (ref 3.8–5.2)
RBC # FLD: 14.4 % — SIGNIFICANT CHANGE UP (ref 10.3–14.5)
SARS-COV-2 RNA SPEC QL NAA+PROBE: DETECTED
SODIUM SERPL-SCNC: 137 MMOL/L — SIGNIFICANT CHANGE UP (ref 135–145)
TROPONIN T, HIGH SENSITIVITY RESULT: <6 NG/L — SIGNIFICANT CHANGE UP
WBC # BLD: 7.17 K/UL — SIGNIFICANT CHANGE UP (ref 3.8–10.5)
WBC # FLD AUTO: 7.17 K/UL — SIGNIFICANT CHANGE UP (ref 3.8–10.5)

## 2021-03-26 PROCEDURE — 71045 X-RAY EXAM CHEST 1 VIEW: CPT | Mod: 26

## 2021-03-26 RX ORDER — ACETAMINOPHEN 500 MG
975 TABLET ORAL ONCE
Refills: 0 | Status: DISCONTINUED | OUTPATIENT
Start: 2021-03-26 | End: 2021-03-29

## 2021-03-26 NOTE — ED PROVIDER NOTE - NS ED ROS FT
General: denies fever, chills, weight loss/weight gain.  HENT: denies nasal congestion, sore throat, rhinorrhea, ear pain  Eyes: denies visual changes, blurred vision  Neck: denies neck pain, neck swelling  CV: denies chest pain, palpitations  Resp: denies difficulty breathing, cough  Abdominal: +epigastric pain. No nausea, vomiting.  MSK: denies muscle aches, bony pain, leg pain, leg swelling  Neuro: denies headaches, numbness, tingling  Skin: denies rashes, cuts, bruises

## 2021-03-26 NOTE — ED PROVIDER NOTE - CLINICAL SUMMARY MEDICAL DECISION MAKING FREE TEXT BOX
50 year old female PMH asthma, HTN presents for epigastric pain. Low suspicion for cardiac etiology, but given family hx will obtain troponin and EKG to r/o ACS. Covid test. Pepcid/maalox for symptom control. 50 year old female PMH asthma, HTN presents for epigastric pain. Low suspicion for cardiac etiology, but given family hx will obtain EKG to r/o ACS and compare to outpt EKG, urine dipstick to screen for kidney dz. Covid test. Pepcid/maalox for symptom control.

## 2021-03-26 NOTE — ED PROVIDER NOTE - NSTIMEPROVIDERCAREINITIATE_GEN_ER
GROUP THERAPY PROGRESS NOTE    True Paget is participating in Recreational Therapy. Group time: 45 minutes    Personal goal for participation: Exercise,Social    Goal orientation: social    Group therapy participation: passive    Therapeutic interventions reviewed and discussed: Exercise,Social    Impression of participation: Pt dribbled the ball and walked around the court while outside; not very energetic. Pt affect is flat. Once we went inside, Pt sat at the table with his head down. When asked questions Pt appeared irritated when answering. 25-Mar-2021 23:21

## 2021-03-26 NOTE — ED PROVIDER NOTE - PHYSICAL EXAMINATION
General appearance: NAD, conversant, afebrile    Eyes: anicteric sclerae, moist conjunctivae; no lid-lag; Pupils equal, round and reactive to light; Extraocular muscles intact   HENT: Atraumatic; oropharynx clear with moist mucous membranes and no mucosal ulcerations; normal hard and soft palate; no pharyngeal erythema or exudate   Neck: Trachea midline; Full range of motion, supple; no thyromegaly or lymphadenopathy   Pulm: Lungs clear to auscultation bilaterally, with normal respiratory effort    CV: Regular Rhythm and Rate; Normal S1, S2; No murmurs, rubs, or gallops. 2+ peripheral pulses.   Abdomen: Soft, non-tender, non-distended; no masses or hepatosplenomegaly.   Extremities: No peripheral edema or extremity lymphadenopathy.   Skin: Normal temperature, turgor and texture; no rash, ulcers or subcutaneous nodules   Psych: Appropriate affect, cooperative; alert and oriented to person, place and time

## 2021-03-26 NOTE — ED PROVIDER NOTE - PATIENT PORTAL LINK FT
You can access the FollowMyHealth Patient Portal offered by [facility] by registering at the following website: [email or web address]. By joining [facility]’s FollowMyHealth portal, you will also be able to view your health information using other applications (apps) compatible with our system.

## 2021-03-26 NOTE — ED PROVIDER NOTE - NSFOLLOWUPINSTRUCTIONS_ED_ALL_ED_FT
You were seen today for symptoms likely due to COVID. Please quarantine. Follow the instructions in the following page.  If you start having shortness of breath, please come back to the emergency room.

## 2021-03-26 NOTE — ED PROVIDER NOTE - OBJECTIVE STATEMENT
50 year old female PMH asthma, HTN presents for epigastric pain from Urgent Care. Patient states that she got the first covid vaccine today and her  tested positive for COVID-19. 50 year old female PMH asthma, HTN presents for epigastric pain from Urgent Care. Patient states that she got the first covid vaccine today and her  tested positive for COVID-19. At Urgent Care she had an EKG and they sent her to the ER for "abnormal EKG." Patient states the epigastric pain comes and goes, feels like gas. Does not radiate. She does not have it now. No nausea, vomiting, SOB. No personal cardiac hx, but father had heart blockages. No fevers, chills, cough, nasal congestion, or shortness of breath.

## 2021-03-26 NOTE — ED ADULT NURSE NOTE - OBJECTIVE STATEMENT
pt a&o x 3 c/o epigastric pain x 2 days. denies n/v, chest pain, sob or fevers. describes it as "stiff." reports chills but recently received the covid vaccine. nad noted. right ac 20g in place. safety maintained

## 2021-03-31 ENCOUNTER — EMERGENCY (EMERGENCY)
Facility: HOSPITAL | Age: 51
LOS: 1 days | Discharge: HOME | End: 2021-03-31
Attending: EMERGENCY MEDICINE | Admitting: EMERGENCY MEDICINE
Payer: COMMERCIAL

## 2021-03-31 VITALS
TEMPERATURE: 98 F | HEART RATE: 83 BPM | OXYGEN SATURATION: 100 % | DIASTOLIC BLOOD PRESSURE: 92 MMHG | SYSTOLIC BLOOD PRESSURE: 143 MMHG | RESPIRATION RATE: 20 BRPM

## 2021-03-31 VITALS
RESPIRATION RATE: 20 BRPM | SYSTOLIC BLOOD PRESSURE: 134 MMHG | OXYGEN SATURATION: 100 % | TEMPERATURE: 98 F | HEART RATE: 78 BPM | HEIGHT: 63 IN | DIASTOLIC BLOOD PRESSURE: 75 MMHG

## 2021-03-31 LAB
ALBUMIN SERPL ELPH-MCNC: 3.8 G/DL — SIGNIFICANT CHANGE UP (ref 3.3–5)
ALP SERPL-CCNC: 73 U/L — SIGNIFICANT CHANGE UP (ref 40–120)
ALT FLD-CCNC: 22 U/L — SIGNIFICANT CHANGE UP (ref 4–33)
ANION GAP SERPL CALC-SCNC: 10 MMOL/L — SIGNIFICANT CHANGE UP (ref 7–14)
ANISOCYTOSIS BLD QL: SLIGHT — SIGNIFICANT CHANGE UP
AST SERPL-CCNC: 27 U/L — SIGNIFICANT CHANGE UP (ref 4–32)
BASOPHILS # BLD AUTO: 0 K/UL — SIGNIFICANT CHANGE UP (ref 0–0.2)
BASOPHILS NFR BLD AUTO: 0 % — SIGNIFICANT CHANGE UP (ref 0–2)
BILIRUB SERPL-MCNC: <0.2 MG/DL — SIGNIFICANT CHANGE UP (ref 0.2–1.2)
BUN SERPL-MCNC: 11 MG/DL — SIGNIFICANT CHANGE UP (ref 7–23)
CALCIUM SERPL-MCNC: 8.9 MG/DL — SIGNIFICANT CHANGE UP (ref 8.4–10.5)
CHLORIDE SERPL-SCNC: 102 MMOL/L — SIGNIFICANT CHANGE UP (ref 98–107)
CO2 SERPL-SCNC: 25 MMOL/L — SIGNIFICANT CHANGE UP (ref 22–31)
CREAT SERPL-MCNC: 0.6 MG/DL — SIGNIFICANT CHANGE UP (ref 0.5–1.3)
EOSINOPHIL # BLD AUTO: 0 K/UL — SIGNIFICANT CHANGE UP (ref 0–0.5)
EOSINOPHIL NFR BLD AUTO: 0 % — SIGNIFICANT CHANGE UP (ref 0–6)
GIANT PLATELETS BLD QL SMEAR: PRESENT — SIGNIFICANT CHANGE UP
GLUCOSE SERPL-MCNC: 96 MG/DL — SIGNIFICANT CHANGE UP (ref 70–99)
HCT VFR BLD CALC: 44.8 % — SIGNIFICANT CHANGE UP (ref 34.5–45)
HGB BLD-MCNC: 13.3 G/DL — SIGNIFICANT CHANGE UP (ref 11.5–15.5)
IANC: 2.87 K/UL — SIGNIFICANT CHANGE UP (ref 1.5–8.5)
LIDOCAIN IGE QN: 24 U/L — SIGNIFICANT CHANGE UP (ref 7–60)
LYMPHOCYTES # BLD AUTO: 0.9 K/UL — LOW (ref 1–3.3)
LYMPHOCYTES # BLD AUTO: 18 % — SIGNIFICANT CHANGE UP (ref 13–44)
MCHC RBC-ENTMCNC: 23.4 PG — LOW (ref 27–34)
MCHC RBC-ENTMCNC: 29.7 GM/DL — LOW (ref 32–36)
MCV RBC AUTO: 78.7 FL — LOW (ref 80–100)
MICROCYTES BLD QL: SLIGHT — SIGNIFICANT CHANGE UP
MONOCYTES # BLD AUTO: 0.23 K/UL — SIGNIFICANT CHANGE UP (ref 0–0.9)
MONOCYTES NFR BLD AUTO: 4.5 % — SIGNIFICANT CHANGE UP (ref 2–14)
NEUTROPHILS # BLD AUTO: 3.57 K/UL — SIGNIFICANT CHANGE UP (ref 1.8–7.4)
NEUTROPHILS NFR BLD AUTO: 66.7 % — SIGNIFICANT CHANGE UP (ref 43–77)
NEUTS BAND # BLD: 4.5 % — SIGNIFICANT CHANGE UP (ref 0–6)
OVALOCYTES BLD QL SMEAR: SLIGHT — SIGNIFICANT CHANGE UP
PLAT MORPH BLD: ABNORMAL
PLATELET # BLD AUTO: 221 K/UL — SIGNIFICANT CHANGE UP (ref 150–400)
PLATELET COUNT - ESTIMATE: NORMAL — SIGNIFICANT CHANGE UP
POIKILOCYTOSIS BLD QL AUTO: SLIGHT — SIGNIFICANT CHANGE UP
POTASSIUM SERPL-MCNC: 4.6 MMOL/L — SIGNIFICANT CHANGE UP (ref 3.5–5.3)
POTASSIUM SERPL-SCNC: 4.6 MMOL/L — SIGNIFICANT CHANGE UP (ref 3.5–5.3)
PROT SERPL-MCNC: 7.5 G/DL — SIGNIFICANT CHANGE UP (ref 6–8.3)
RBC # BLD: 5.69 M/UL — HIGH (ref 3.8–5.2)
RBC # FLD: 14.2 % — SIGNIFICANT CHANGE UP (ref 10.3–14.5)
RBC BLD AUTO: ABNORMAL
SMUDGE CELLS # BLD: PRESENT — SIGNIFICANT CHANGE UP
SODIUM SERPL-SCNC: 137 MMOL/L — SIGNIFICANT CHANGE UP (ref 135–145)
TROPONIN T, HIGH SENSITIVITY RESULT: <6 NG/L — SIGNIFICANT CHANGE UP
VARIANT LYMPHS # BLD: 6.3 % — HIGH (ref 0–6)
WBC # BLD: 5.02 K/UL — SIGNIFICANT CHANGE UP (ref 3.8–10.5)
WBC # FLD AUTO: 5.02 K/UL — SIGNIFICANT CHANGE UP (ref 3.8–10.5)

## 2021-03-31 PROCEDURE — 93010 ELECTROCARDIOGRAM REPORT: CPT

## 2021-03-31 PROCEDURE — 71045 X-RAY EXAM CHEST 1 VIEW: CPT | Mod: 26

## 2021-03-31 PROCEDURE — 99284 EMERGENCY DEPT VISIT MOD MDM: CPT | Mod: 25

## 2021-03-31 RX ORDER — SODIUM CHLORIDE 9 MG/ML
2000 INJECTION INTRAMUSCULAR; INTRAVENOUS; SUBCUTANEOUS ONCE
Refills: 0 | Status: COMPLETED | OUTPATIENT
Start: 2021-03-31 | End: 2021-03-31

## 2021-03-31 RX ORDER — METOCLOPRAMIDE HCL 10 MG
10 TABLET ORAL ONCE
Refills: 0 | Status: COMPLETED | OUTPATIENT
Start: 2021-03-31 | End: 2021-03-31

## 2021-03-31 RX ORDER — KETOROLAC TROMETHAMINE 30 MG/ML
15 SYRINGE (ML) INJECTION ONCE
Refills: 0 | Status: DISCONTINUED | OUTPATIENT
Start: 2021-03-31 | End: 2021-03-31

## 2021-03-31 RX ADMIN — Medication 15 MILLIGRAM(S): at 22:14

## 2021-03-31 RX ADMIN — Medication 15 MILLIGRAM(S): at 23:18

## 2021-03-31 RX ADMIN — SODIUM CHLORIDE 2000 MILLILITER(S): 9 INJECTION INTRAMUSCULAR; INTRAVENOUS; SUBCUTANEOUS at 23:18

## 2021-03-31 RX ADMIN — Medication 10 MILLIGRAM(S): at 23:07

## 2021-03-31 RX ADMIN — SODIUM CHLORIDE 4000 MILLILITER(S): 9 INJECTION INTRAMUSCULAR; INTRAVENOUS; SUBCUTANEOUS at 22:14

## 2021-03-31 NOTE — ED ADULT NURSE NOTE - NS ED PATIENT SAFETY CONCERN
no lesions,  no deformities,  no traumatic injuries,  no significant scars are present,  chest wall non-tender,  no masses present, breathing is unlabored without accessory muscle use, normal breath sounds No

## 2021-03-31 NOTE — ED ADULT TRIAGE NOTE - PAIN RATING/NUMBER SCALE (0-10): REST
Patient is a 61 year old male pt with hx of BPH(on flomax and finasteride) presents to ED after being called by the ed for positive blood cultures. Patient initially presented to the ed with 2 day hx of severe suprapubic pain, dysuria, frequency and dribbling and testicular fullness. Patient also was not feeling well and had decreased po intake. Patient of note has not been urinating normally for over 1 year as per daughter and he admits to purposely not drinking water to avoid urinating. Patient seen at bedside and states he still feels weak but no longer has testicular pain. Patient also states he has been feeling warm at home but did not measure his temperature. Patient denies any other complains    patient receive 1 dose of iv ceft in er and re- cultured
8

## 2021-03-31 NOTE — ED PROVIDER NOTE - PATIENT PORTAL LINK FT
You can access the FollowMyHealth Patient Portal offered by [facility] by registering at the following website: [email or web address]. By joining [employer] [facility]’s FollowMyHealth portal, you will also be able to view your health information using other applications (apps) compatible with our system.

## 2021-03-31 NOTE — ED PROVIDER NOTE - PROGRESS NOTE DETAILS
HENRY Swift: Labs reviewed, H/H stable, lipase and Trop wnl. Pt reassessed, states she feels better. O2 sat 100% on RA. stable for dc home. PMD follow up. Strict return precautions.

## 2021-03-31 NOTE — ED ADULT TRIAGE NOTE - CHIEF COMPLAINT QUOTE
pt c/o CP, left rib pain, chest congestion, HA, stuffy nose, chest burning, and back pain.  Vomited black before arriving to ER.  Denies SOB or fever.  +Cough.  Covid+3/25.  Hx HTN asthma anxiety

## 2021-03-31 NOTE — ED ADULT TRIAGE NOTE - AS HEIGHT TYPE
stated
[FreeTextEntry1] : Examination:\par Constitutional: normal, no apparent distress\par Eyes: normal conjunctiva b/l, no ptosis, visual fields full\par Respiratory: no respiratory distress, normal effort, normal auscultation\par Cardiovascular: normal rate, rhythm, no murmurs\par Neck: supple, no masses\par Vascular: carotids normal\par Skin: normal color, no rashes\par Psych: normal mood, affect\par \par Neurological:\par Memory: normal memory, oriented to person, place, time\par Language intact/no aphasia\par Cranial Nerves: II-XII intact, Pupils equally round and reactive to light, ocular muscles/movements intact, no ptosis, no facial weakness, tongue protrudes normally in the midline, \par Motor: normal tone, no pronator drift, full strength in all four extremities in the proximal and distal muscle groups\par Coordination: Fine motor movements intact, rapid alternating movements intact, finger to nose intact bilaterally\par Sensory: intact to light touch, vibration, joint position sense, negative Romberg examination\par DTRs: symmetric, 2+ in b/l triceps, 2+ in b/l biceps, 2+ in b/l brachioradialis, 2+ in left  patellar, 3+ on right 2+ in bilateral Achilles, Babinskis negative bilaterally, + cornell's bilaterally\par Gait: narrow based, steady\par \par

## 2021-03-31 NOTE — ED PROVIDER NOTE - NSFOLLOWUPINSTRUCTIONS_ED_ALL_ED_FT
Follow up with your PMD within 48-72 hrs. Show copies of your reports given to you. Take all of your medications as previously prescribed.    If you have any new, worsened or concerning symptoms, please return to the emergency department immediately.

## 2021-03-31 NOTE — ED PROVIDER NOTE - OBJECTIVE STATEMENT
50 year old female with PMH of Asthma and Anxiety presents to the ED complaining of chest pain today. Pt states she tested positive for covid-19 on 3/25 and this morning she had an episode vomiting which appeared black only once. She later developed midsternum chest pain radiating to the back, constant, no relieving or aggravating factors. Denies associated shortness of breath, cough, dizziness, abdominal pain, diarrhea, blood in stool or black stool, fevers and chills. Denies any other complaints.

## 2021-03-31 NOTE — ED PROVIDER NOTE - CLINICAL SUMMARY MEDICAL DECISION MAKING FREE TEXT BOX
50 year old female with PMH of Asthma and Anxiety presents to the ED complaining of chest pain today after an episode of vomiting. HD stable. Imp: Chest pain after vomiting, in setting of Covid-19 infection, likely viral syndrome. Very low suspicion for ACS and clinical presentation inconsistent with PE. Plan for labs including cardiac enzymes, symptomatic care, monitor and reassess.

## 2021-03-31 NOTE — ED ADULT NURSE NOTE - SUICIDE SCREENING QUESTION 3
Anesthesia Pre-Procedure Evaluation    Patient: Stas Houston   MRN:     [id number] Gender:   male   Age:    59 year old :      1960        Preoperative Diagnosis: Visually Significant Cataracts, Bilateral   Procedure(s):  Right Eye Phacoemulsification with Standard Intraocular Lens     Past Medical History:   Diagnosis Date     Depressive disorder      Glaucoma suspect      Hypertension      OHT (ocular hypertension)       Past Surgical History:   Procedure Laterality Date     ORTHOPEDIC SURGERY  2017    Left Shoulder Surgery     PHACOEMULSIFICATION WITH STANDARD INTRAOCULAR LENS IMPLANT Left 2019    Procedure: Left Eye Phacoemulsification with Standard Intraocular Lens;  Surgeon: Lynnette Buchanan MD;  Location:  OR          Anesthesia Evaluation     . Pt has had prior anesthetic. Type: MAC, Regional and General           ROS/MED HX    ENT/Pulmonary:     (+)AJ risk factors hypertension, tobacco use, Current use 1 PPD x 40 years  packs/day  , . .    Neurologic:     (+)migraines,     Cardiovascular:     (+) Dyslipidemia, hypertension----. : . . . :. . Previous cardiac testing date:results:date: results:ECG reviewed date: results: date: results:          METS/Exercise Tolerance:  >4 METS   Hematologic:  - neg hematologic  ROS       Musculoskeletal:   (+)  other musculoskeletal- knee pain      GI/Hepatic:     (+) GERD hepatitis type C, Other GI/Hepatic normal liver panel. negative Negative PCR after treatment at [facility]      Renal/Genitourinary:  - ROS Renal section negative       Endo:  - neg endo ROS       Psychiatric: Comment: Recovering alcoholism - last drink 11 months ago  Sees a therapist and has gabapentin for neuropathy, anxiety.     (+) psychiatric history anxiety      Infectious Disease:  - neg infectious disease ROS       Malignancy:      - no malignancy   Other:    (+) Possibly pregnant H/O Chronic Pain,no other significant disability                        PHYSICAL EXAM:   Mental  "Status/Neuro: A/A/O   Airway: Facies: Feasible  Mallampati: II  Mouth/Opening: Full  TM distance: > 6 cm  Neck ROM: Full   Respiratory: Auscultation: Wheezing; Other    (forced expiratory wheeze intermittently)  Resp. Rate: Normal     Resp. Effort: Normal      CV: Rhythm: Regular  Rate: Age appropriate  Heart: Normal Sounds   Comments:      Dental:  Dental Comments: Edentulous lower, upper denture                Lab Results   Component Value Date    WBC 9.0 11/02/2016    HGB 13.5 11/02/2016    HCT 39.0 (L) 11/02/2016     05/03/2019     11/02/2016    POTASSIUM 3.2 (L) 11/02/2016    CHLORIDE 109 11/02/2016    CO2 27 11/02/2016    BUN 14 11/02/2016    CR 0.97 11/02/2016     (H) 11/02/2016    AMBROSIO 8.5 11/02/2016    ALBUMIN 4.3 11/02/2016    PROTTOTAL 7.6 11/02/2016    ALT 24 11/02/2016    AST 25 11/02/2016    ALKPHOS 93 11/02/2016    BILITOTAL 0.2 11/02/2016    PTT 29 05/03/2019    INR 1.00 05/03/2019       Preop Vitals  BP Readings from Last 3 Encounters:   05/29/19 (!) 131/92   05/20/19 123/79   05/08/19 (!) 140/94    Pulse Readings from Last 3 Encounters:   05/29/19 67   05/08/19 62   05/07/19 69      Resp Readings from Last 3 Encounters:   05/29/19 16   05/08/19 16   05/07/19 18    SpO2 Readings from Last 3 Encounters:   05/29/19 96%   05/08/19 96%   05/07/19 97%      Temp Readings from Last 1 Encounters:   05/29/19 36.4  C (97.5  F) (Oral)    Ht Readings from Last 1 Encounters:   05/29/19 1.676 m (5' 6\")      Wt Readings from Last 1 Encounters:   05/29/19 77.1 kg (170 lb)    Estimated body mass index is 27.44 kg/m  as calculated from the following:    Height as of this encounter: 1.676 m (5' 6\").    Weight as of this encounter: 77.1 kg (170 lb).     LDA:  Peripheral IV 05/29/19 Left Hand (Active)   Site Assessment WDL 5/29/2019  6:39 AM   Line Status Infusing 5/29/2019  6:39 AM   Phlebitis Scale 0-->no symptoms 5/29/2019  6:39 AM   Infiltration Scale 0 5/29/2019  6:39 AM   Infiltration Site " Treatment Method  None 5/29/2019  6:39 AM   Extravasation? No 5/29/2019  6:39 AM   Dressing Intervention New dressing  5/29/2019  6:39 AM   Number of days: 0            Assessment:   ASA SCORE: 2    NPO Status: > 6 hours since completed Solid Foods   Documentation: H&P complete; Preop Testing complete; Consents complete   Proceeding: Proceed without further delay  Tobacco Use:  NO Active use of Tobacco/UNKNOWN Tobacco use status     Plan:   Anes. Type:  General   Pre-Induction: Midazolam IV; Acetaminophen PO   Induction:  IV (Standard)   Airway: Oral ETT   Access/Monitoring: PIV   Maintenance: Balanced   Emergence: Procedure Site   Logistics: Same Day Surgery     Postop Pain/Sedation Strategy:  Standard-Options: Opioids PRN     PONV Management:  Adult Risk Factors:, Non-Smoker, Postop Opioids     CONSENT: Direct conversation   Plan and risks discussed with: Patient   Blood Products: Consent Deferred (Minimal Blood Loss)                         Jose Armando Sheridan DO   No

## 2021-03-31 NOTE — ED ADULT NURSE NOTE - OBJECTIVE STATEMENT
Pt received to intake room 1. Pt A&Ox4, ambulatory. Pt states that she tested positive for covid on 3/25. Pt began having cough, headaches, and chest pain radiating to the back. Pt states that today she had one episode of "black" vomit while brushing her teeth. Respirations equal and unlabored, no acute distress noted. Abdomen soft, nondistended, nontender. Denies SOB, fevers, diarrhea, dizziness, weakness, fatigue. 18g IV placed in left AC, labs drawn and sent as ordered. Medicated as per eMAR. Vital signs as noted. MD at bedside for evaluation, assessment ongoing.

## 2021-03-31 NOTE — ED PROVIDER NOTE - ATTENDING CONTRIBUTION TO CARE
Well appearing pt w/ covid, bronchospastic cough, Well appearing pt w/ covid, bronchospastic cough, will trial albuterol. decreased PO, will give reglan. will check electrolytes.   GEN - NAD; well appearing; A+O x3   HEAD - NC/AT     EYES - EOMI, no conjunctival pallor, no scleral icterus  ENT -   mucous membranes  moist , no discharge      NECK - Neck supple  PULM - CTA b/l,  symmetric breath sounds  COR -  RRR, S1 S2, no murmurs  ABD - , ND, NT, soft, no guarding, no rebound, no masses    BACK - no CVA tenderness, nontender spine     EXTREMS - no edema, no deformity, warm and well perfused    SKIN - no rash or bruising      NEUROLOGIC - alert, sensation nl, motor 5/5 RUE/LUE/RLE/LLE

## 2021-04-02 ENCOUNTER — APPOINTMENT (OUTPATIENT)
Dept: PAIN MANAGEMENT | Facility: CLINIC | Age: 51
End: 2021-04-02

## 2021-04-21 ENCOUNTER — APPOINTMENT (OUTPATIENT)
Dept: DISASTER EMERGENCY | Facility: OTHER | Age: 51
End: 2021-04-21
Payer: MEDICAID

## 2021-04-21 PROCEDURE — 0002A: CPT

## 2021-05-06 ENCOUNTER — NON-APPOINTMENT (OUTPATIENT)
Age: 51
End: 2021-05-06

## 2021-05-10 ENCOUNTER — APPOINTMENT (OUTPATIENT)
Dept: GASTROENTEROLOGY | Facility: CLINIC | Age: 51
End: 2021-05-10
Payer: COMMERCIAL

## 2021-05-10 VITALS
TEMPERATURE: 98.1 F | DIASTOLIC BLOOD PRESSURE: 80 MMHG | SYSTOLIC BLOOD PRESSURE: 120 MMHG | HEART RATE: 107 BPM | HEIGHT: 63 IN | OXYGEN SATURATION: 96 % | BODY MASS INDEX: 31.89 KG/M2 | WEIGHT: 180 LBS

## 2021-05-10 DIAGNOSIS — R07.89 OTHER CHEST PAIN: ICD-10-CM

## 2021-05-10 DIAGNOSIS — Z01.818 ENCOUNTER FOR OTHER PREPROCEDURAL EXAMINATION: ICD-10-CM

## 2021-05-10 DIAGNOSIS — Z12.11 ENCOUNTER FOR SCREENING FOR MALIGNANT NEOPLASM OF COLON: ICD-10-CM

## 2021-05-10 DIAGNOSIS — R12 HEARTBURN: ICD-10-CM

## 2021-05-10 DIAGNOSIS — Z82.49 FAMILY HISTORY OF ISCHEMIC HEART DISEASE AND OTHER DISEASES OF THE CIRCULATORY SYSTEM: ICD-10-CM

## 2021-05-10 DIAGNOSIS — Z83.3 FAMILY HISTORY OF DIABETES MELLITUS: ICD-10-CM

## 2021-05-10 PROCEDURE — 99072 ADDL SUPL MATRL&STAF TM PHE: CPT

## 2021-05-10 PROCEDURE — 99204 OFFICE O/P NEW MOD 45 MIN: CPT

## 2021-05-10 RX ORDER — LORAZEPAM 0.5 MG/1
0.5 TABLET ORAL
Refills: 0 | Status: ACTIVE | COMMUNITY

## 2021-05-10 RX ORDER — LORATADINE 5 MG/5 ML
10-240 SOLUTION, ORAL ORAL
Refills: 0 | Status: ACTIVE | COMMUNITY

## 2021-05-10 RX ORDER — ACETAMINOPHEN 325 MG/1
325 TABLET, FILM COATED ORAL
Refills: 0 | Status: ACTIVE | COMMUNITY

## 2021-05-10 NOTE — ASSESSMENT
[FreeTextEntry1] : Patient with possible GERD symptoms and esophageal spasm.  She will be started on omeprazole 40 mg daily.  A sonogram will be ordered.  She will be scheduled for upper endoscopy and screening colonoscopy.  FOBT will be sent to the lab.

## 2021-05-10 NOTE — PHYSICAL EXAM
[General Appearance - Alert] : alert [General Appearance - In No Acute Distress] : in no acute distress [Sclera] : the sclera and conjunctiva were normal [PERRL With Normal Accommodation] : pupils were equal in size, round, and reactive to light [Extraocular Movements] : extraocular movements were intact [Outer Ear] : the ears and nose were normal in appearance [Oropharynx] : the oropharynx was normal [Neck Appearance] : the appearance of the neck was normal [Neck Cervical Mass (___cm)] : no neck mass was observed [Jugular Venous Distention Increased] : there was no jugular-venous distention [Thyroid Diffuse Enlargement] : the thyroid was not enlarged [Thyroid Nodule] : there were no palpable thyroid nodules [Auscultation Breath Sounds / Voice Sounds] : lungs were clear to auscultation bilaterally [Heart Rate And Rhythm] : heart rate was normal and rhythm regular [Heart Sounds] : normal S1 and S2 [Heart Sounds Gallop] : no gallops [Murmurs] : no murmurs [Heart Sounds Pericardial Friction Rub] : no pericardial rub [Bowel Sounds] : normal bowel sounds [Abdomen Soft] : soft [] : no hepato-splenomegaly [Abdomen Tenderness] : non-tender [Abdomen Mass (___ Cm)] : no abdominal mass palpated [No CVA Tenderness] : no ~M costovertebral angle tenderness [No Spinal Tenderness] : no spinal tenderness [Abnormal Walk] : normal gait [Nail Clubbing] : no clubbing  or cyanosis of the fingernails [Musculoskeletal - Swelling] : no joint swelling seen [Motor Tone] : muscle strength and tone were normal [Oriented To Time, Place, And Person] : oriented to person, place, and time [Impaired Insight] : insight and judgment were intact [Affect] : the affect was normal

## 2021-05-10 NOTE — REVIEW OF SYSTEMS
[Chest Pain] : chest pain [Wheezing] : wheezing [As Noted in HPI] : as noted in HPI [Anxiety] : anxiety [Negative] : Heme/Lymph

## 2021-05-10 NOTE — HISTORY OF PRESENT ILLNESS
[FreeTextEntry1] : Patient is a 50-year-old female who is referred for abdominal pain and anemia.  She presented to the emergency room with pain in the mid lower sternal area associated with some substernal burning.  The pain sometimes radiates to the left upper quadrant and back.  It is not related to meals or exertion.  She apparently had a stress test last year which was negative.  The symptoms are occurring more often and daily.  She denies any dysphagia.\par Blood work from the emergency room revealed H/H 13.3/44.8, MCV 78.  Covid PCR was positive.\par She claims that she had upper respiratory type symptoms which resolved.  She has received both vaccine doses.

## 2021-05-19 ENCOUNTER — APPOINTMENT (OUTPATIENT)
Dept: ULTRASOUND IMAGING | Facility: IMAGING CENTER | Age: 51
End: 2021-05-19
Payer: COMMERCIAL

## 2021-05-19 ENCOUNTER — OUTPATIENT (OUTPATIENT)
Dept: OUTPATIENT SERVICES | Facility: HOSPITAL | Age: 51
LOS: 1 days | End: 2021-05-19
Payer: COMMERCIAL

## 2021-05-19 DIAGNOSIS — Z00.8 ENCOUNTER FOR OTHER GENERAL EXAMINATION: ICD-10-CM

## 2021-05-19 DIAGNOSIS — R07.89 OTHER CHEST PAIN: ICD-10-CM

## 2021-05-19 PROCEDURE — 76700 US EXAM ABDOM COMPLETE: CPT | Mod: 26

## 2021-05-19 PROCEDURE — 76700 US EXAM ABDOM COMPLETE: CPT

## 2021-05-20 LAB — HEMOCCULT STL QL IA: NEGATIVE

## 2021-06-21 ENCOUNTER — APPOINTMENT (OUTPATIENT)
Dept: OPHTHALMOLOGY | Facility: CLINIC | Age: 51
End: 2021-06-21
Payer: COMMERCIAL

## 2021-06-21 ENCOUNTER — NON-APPOINTMENT (OUTPATIENT)
Age: 51
End: 2021-06-21

## 2021-06-21 PROCEDURE — 99072 ADDL SUPL MATRL&STAF TM PHE: CPT

## 2021-06-21 PROCEDURE — 92133 CPTRZD OPH DX IMG PST SGM ON: CPT

## 2021-06-21 PROCEDURE — 92004 COMPRE OPH EXAM NEW PT 1/>: CPT

## 2021-06-24 ENCOUNTER — APPOINTMENT (OUTPATIENT)
Dept: GASTROENTEROLOGY | Facility: AMBULATORY MEDICAL SERVICES | Age: 51
End: 2021-06-24
Payer: COMMERCIAL

## 2021-06-24 PROCEDURE — 45378 DIAGNOSTIC COLONOSCOPY: CPT

## 2021-06-24 PROCEDURE — 43239 EGD BIOPSY SINGLE/MULTIPLE: CPT | Mod: 59

## 2021-08-02 ENCOUNTER — TRANSCRIPTION ENCOUNTER (OUTPATIENT)
Age: 51
End: 2021-08-02

## 2021-08-02 ENCOUNTER — APPOINTMENT (OUTPATIENT)
Dept: GASTROENTEROLOGY | Facility: CLINIC | Age: 51
End: 2021-08-02
Payer: COMMERCIAL

## 2021-08-02 VITALS
DIASTOLIC BLOOD PRESSURE: 78 MMHG | HEART RATE: 82 BPM | BODY MASS INDEX: 33.13 KG/M2 | WEIGHT: 180 LBS | TEMPERATURE: 97 F | OXYGEN SATURATION: 98 % | HEIGHT: 62 IN | SYSTOLIC BLOOD PRESSURE: 117 MMHG

## 2021-08-02 DIAGNOSIS — R14.2 ERUCTATION: ICD-10-CM

## 2021-08-02 PROCEDURE — 99214 OFFICE O/P EST MOD 30 MIN: CPT

## 2021-08-02 NOTE — ASSESSMENT
[FreeTextEntry1] : Patient is status post an upper endoscopy that revealed evidence of H. pylori gastritis.  She will be started on Pylera for 10 days and omeprazole 40 mg twice daily for 10 days.  An H. pylori breath test will be done 3 to 4 weeks after finishing treatment.\par Patient had a colonoscopy that was normal as well.

## 2021-08-02 NOTE — HISTORY OF PRESENT ILLNESS
[FreeTextEntry1] : Patient is status post colonoscopy and upper endoscopy.  The colonoscopy was essentially normal.\par The upper endoscopy revealed evidence of gastritis.  Biopsies were consistent with H. pylori gastritis.  Patient is having less pain despite not taking her H2 blocker.  She does have some relief with belching.

## 2021-08-10 ENCOUNTER — APPOINTMENT (OUTPATIENT)
Dept: PSYCHIATRY | Facility: CLINIC | Age: 51
End: 2021-08-10
Payer: COMMERCIAL

## 2021-08-10 PROCEDURE — 99205 OFFICE O/P NEW HI 60 MIN: CPT

## 2021-08-10 RX ORDER — FLUOXETINE HYDROCHLORIDE 10 MG/1
10 CAPSULE ORAL
Qty: 30 | Refills: 0 | Status: DISCONTINUED | COMMUNITY
Start: 2021-01-05 | End: 2021-08-10

## 2021-08-10 RX ORDER — PAROXETINE HYDROCHLORIDE 20 MG/1
20 TABLET, FILM COATED ORAL
Qty: 30 | Refills: 0 | Status: DISCONTINUED | COMMUNITY
Start: 2020-12-29 | End: 2021-08-10

## 2021-08-10 RX ORDER — PAROXETINE HYDROCHLORIDE 10 MG/1
10 TABLET, FILM COATED ORAL
Qty: 30 | Refills: 0 | Status: DISCONTINUED | COMMUNITY
Start: 2020-12-08 | End: 2021-08-10

## 2021-08-10 RX ORDER — AMLODIPINE AND OLMESARTAN MEDOXOMIL 5; 20 MG/1; MG/1
5-20 TABLET ORAL
Refills: 0 | Status: DISCONTINUED | COMMUNITY
End: 2021-08-10

## 2021-08-10 RX ORDER — AZELASTINE HYDROCHLORIDE 137 UG/1
0.1 SPRAY, METERED NASAL
Qty: 30 | Refills: 0 | Status: DISCONTINUED | COMMUNITY
Start: 2021-03-26 | End: 2021-08-10

## 2021-08-10 RX ORDER — PREDNISONE 20 MG/1
20 TABLET ORAL
Qty: 3 | Refills: 0 | Status: DISCONTINUED | COMMUNITY
Start: 2020-11-19 | End: 2021-08-10

## 2021-08-10 RX ORDER — OMEPRAZOLE 40 MG/1
40 CAPSULE, DELAYED RELEASE ORAL
Qty: 20 | Refills: 0 | Status: DISCONTINUED | OUTPATIENT
Start: 2021-08-02 | End: 2021-08-10

## 2021-08-10 RX ORDER — ERGOCALCIFEROL 1.25 MG/1
1.25 MG CAPSULE, LIQUID FILLED ORAL
Qty: 4 | Refills: 0 | Status: DISCONTINUED | COMMUNITY
Start: 2021-03-26 | End: 2021-08-10

## 2021-08-10 RX ORDER — ALBUTEROL 90 MCG
90 AEROSOL (GRAM) INHALATION
Refills: 0 | Status: DISCONTINUED | COMMUNITY
End: 2021-08-10

## 2021-08-10 RX ORDER — ALPRAZOLAM 0.25 MG/1
0.25 TABLET ORAL
Qty: 30 | Refills: 0 | Status: DISCONTINUED | COMMUNITY
Start: 2020-12-29 | End: 2021-08-10

## 2021-08-10 RX ORDER — POLYETHYLENE GLYCOL 3350, SODIUM SULFATE, SODIUM CHLORIDE, POTASSIUM CHLORIDE, ASCORBIC ACID, SODIUM ASCORBATE 140-9-5.2G
140 KIT ORAL
Qty: 1 | Refills: 0 | Status: DISCONTINUED | COMMUNITY
Start: 2021-05-10 | End: 2021-08-10

## 2021-08-10 RX ORDER — PANTOPRAZOLE 40 MG/1
40 TABLET, DELAYED RELEASE ORAL
Qty: 30 | Refills: 0 | Status: DISCONTINUED | COMMUNITY
Start: 2021-05-04 | End: 2021-08-10

## 2021-08-10 RX ORDER — ESCITALOPRAM OXALATE 5 MG/1
5 TABLET ORAL
Qty: 30 | Refills: 0 | Status: DISCONTINUED | COMMUNITY
Start: 2021-01-21 | End: 2021-08-10

## 2021-08-10 RX ORDER — BENZONATATE 100 MG/1
100 CAPSULE ORAL
Qty: 45 | Refills: 0 | Status: DISCONTINUED | COMMUNITY
Start: 2021-03-26 | End: 2021-08-10

## 2021-08-26 ENCOUNTER — NON-APPOINTMENT (OUTPATIENT)
Age: 51
End: 2021-08-26

## 2021-09-13 ENCOUNTER — APPOINTMENT (OUTPATIENT)
Dept: OPHTHALMOLOGY | Facility: CLINIC | Age: 51
End: 2021-09-13

## 2021-09-27 ENCOUNTER — APPOINTMENT (OUTPATIENT)
Dept: PSYCHIATRY | Facility: CLINIC | Age: 51
End: 2021-09-27

## 2021-10-07 ENCOUNTER — EMERGENCY (EMERGENCY)
Facility: HOSPITAL | Age: 51
LOS: 1 days | Discharge: HOME | End: 2021-10-07
Attending: STUDENT IN AN ORGANIZED HEALTH CARE EDUCATION/TRAINING PROGRAM | Admitting: EMERGENCY MEDICINE
Payer: COMMERCIAL

## 2021-10-07 VITALS
DIASTOLIC BLOOD PRESSURE: 65 MMHG | RESPIRATION RATE: 16 BRPM | HEART RATE: 78 BPM | HEIGHT: 63 IN | OXYGEN SATURATION: 100 % | SYSTOLIC BLOOD PRESSURE: 127 MMHG | TEMPERATURE: 98 F

## 2021-10-07 LAB
ALBUMIN SERPL ELPH-MCNC: 4.1 G/DL — SIGNIFICANT CHANGE UP (ref 3.3–5)
ALP SERPL-CCNC: 69 U/L — SIGNIFICANT CHANGE UP (ref 40–120)
ALT FLD-CCNC: 19 U/L — SIGNIFICANT CHANGE UP (ref 4–33)
ANION GAP SERPL CALC-SCNC: 11 MMOL/L — SIGNIFICANT CHANGE UP (ref 7–14)
AST SERPL-CCNC: 33 U/L — HIGH (ref 4–32)
BASOPHILS # BLD AUTO: 0.02 K/UL — SIGNIFICANT CHANGE UP (ref 0–0.2)
BASOPHILS NFR BLD AUTO: 0.3 % — SIGNIFICANT CHANGE UP (ref 0–2)
BILIRUB SERPL-MCNC: <0.2 MG/DL — SIGNIFICANT CHANGE UP (ref 0.2–1.2)
BUN SERPL-MCNC: 8 MG/DL — SIGNIFICANT CHANGE UP (ref 7–23)
BUN SERPL-MCNC: 9 MG/DL — SIGNIFICANT CHANGE UP (ref 7–23)
CALCIUM SERPL-MCNC: 9.2 MG/DL — SIGNIFICANT CHANGE UP (ref 8.4–10.5)
CALCIUM SERPL-MCNC: 9.5 MG/DL — SIGNIFICANT CHANGE UP (ref 8.4–10.5)
CHLORIDE SERPL-SCNC: 108 MMOL/L — HIGH (ref 98–107)
CO2 SERPL-SCNC: 19 MMOL/L — LOW (ref 22–31)
CO2 SERPL-SCNC: 22 MMOL/L — SIGNIFICANT CHANGE UP (ref 22–31)
CREAT SERPL-MCNC: 0.58 MG/DL — SIGNIFICANT CHANGE UP (ref 0.5–1.3)
CREAT SERPL-MCNC: 0.63 MG/DL — SIGNIFICANT CHANGE UP (ref 0.5–1.3)
EOSINOPHIL # BLD AUTO: 0.14 K/UL — SIGNIFICANT CHANGE UP (ref 0–0.5)
EOSINOPHIL NFR BLD AUTO: 2 % — SIGNIFICANT CHANGE UP (ref 0–6)
GLUCOSE SERPL-MCNC: 80 MG/DL — SIGNIFICANT CHANGE UP (ref 70–99)
GLUCOSE SERPL-MCNC: 95 MG/DL — SIGNIFICANT CHANGE UP (ref 70–99)
HCT VFR BLD CALC: 41 % — SIGNIFICANT CHANGE UP (ref 34.5–45)
HGB BLD-MCNC: 13.1 G/DL — SIGNIFICANT CHANGE UP (ref 11.5–15.5)
IANC: 4.28 K/UL — SIGNIFICANT CHANGE UP (ref 1.5–8.5)
IMM GRANULOCYTES NFR BLD AUTO: 0.4 % — SIGNIFICANT CHANGE UP (ref 0–1.5)
LYMPHOCYTES # BLD AUTO: 1.99 K/UL — SIGNIFICANT CHANGE UP (ref 1–3.3)
LYMPHOCYTES # BLD AUTO: 28.3 % — SIGNIFICANT CHANGE UP (ref 13–44)
MAGNESIUM SERPL-MCNC: 2.1 MG/DL — SIGNIFICANT CHANGE UP (ref 1.6–2.6)
MCHC RBC-ENTMCNC: 25 PG — LOW (ref 27–34)
MCHC RBC-ENTMCNC: 32 GM/DL — SIGNIFICANT CHANGE UP (ref 32–36)
MCV RBC AUTO: 78.2 FL — LOW (ref 80–100)
MONOCYTES # BLD AUTO: 0.56 K/UL — SIGNIFICANT CHANGE UP (ref 0–0.9)
MONOCYTES NFR BLD AUTO: 8 % — SIGNIFICANT CHANGE UP (ref 2–14)
NEUTROPHILS # BLD AUTO: 4.28 K/UL — SIGNIFICANT CHANGE UP (ref 1.8–7.4)
NEUTROPHILS NFR BLD AUTO: 61 % — SIGNIFICANT CHANGE UP (ref 43–77)
NRBC # BLD: 0 /100 WBCS — SIGNIFICANT CHANGE UP
NRBC # FLD: 0 K/UL — SIGNIFICANT CHANGE UP
PHOSPHATE SERPL-MCNC: 3 MG/DL — SIGNIFICANT CHANGE UP (ref 2.5–4.5)
PLATELET # BLD AUTO: 199 K/UL — SIGNIFICANT CHANGE UP (ref 150–400)
POTASSIUM SERPL-MCNC: 5.7 MMOL/L — HIGH (ref 3.5–5.3)
POTASSIUM SERPL-SCNC: 5.7 MMOL/L — HIGH (ref 3.5–5.3)
PROT SERPL-MCNC: 7.8 G/DL — SIGNIFICANT CHANGE UP (ref 6–8.3)
RBC # BLD: 5.24 M/UL — HIGH (ref 3.8–5.2)
RBC # FLD: 13.7 % — SIGNIFICANT CHANGE UP (ref 10.3–14.5)
SODIUM SERPL-SCNC: 138 MMOL/L — SIGNIFICANT CHANGE UP (ref 135–145)
TROPONIN T, HIGH SENSITIVITY RESULT: <6 NG/L — SIGNIFICANT CHANGE UP
TROPONIN T, HIGH SENSITIVITY RESULT: <6 NG/L — SIGNIFICANT CHANGE UP
WBC # BLD: 7.02 K/UL — SIGNIFICANT CHANGE UP (ref 3.8–10.5)
WBC # FLD AUTO: 7.02 K/UL — SIGNIFICANT CHANGE UP (ref 3.8–10.5)

## 2021-10-07 PROCEDURE — 93010 ELECTROCARDIOGRAM REPORT: CPT

## 2021-10-07 PROCEDURE — 99220: CPT | Mod: 25

## 2021-10-07 PROCEDURE — 71046 X-RAY EXAM CHEST 2 VIEWS: CPT | Mod: 26

## 2021-10-07 RX ORDER — VALSARTAN 80 MG/1
160 TABLET ORAL ONCE
Refills: 0 | Status: COMPLETED | OUTPATIENT
Start: 2021-10-07 | End: 2021-10-07

## 2021-10-07 RX ORDER — ASPIRIN/CALCIUM CARB/MAGNESIUM 324 MG
324 TABLET ORAL ONCE
Refills: 0 | Status: COMPLETED | OUTPATIENT
Start: 2021-10-07 | End: 2021-10-07

## 2021-10-07 RX ADMIN — Medication 324 MILLIGRAM(S): at 19:54

## 2021-10-07 RX ADMIN — VALSARTAN 160 MILLIGRAM(S): 80 TABLET ORAL at 22:24

## 2021-10-07 NOTE — ED ADULT TRIAGE NOTE - CHIEF COMPLAINT QUOTE
Pt c/o intermittent chest pain to Lt side of chest starting yesterday, radiating to Lt side of mouth and Lt arm, endorses numbness/tingling to fingers in Lt hand, + headache, denies SOB, afebrile.

## 2021-10-07 NOTE — ED PROVIDER NOTE - PROGRESS NOTE DETAILS
PA Nolan- Patient labs reviewed and discussed with patient. Willing to stay in CDU. CDU accepted it. Pt to be moved back to intake on monitor . RW RN and Intake RN aware.

## 2021-10-07 NOTE — ED PROVIDER NOTE - ATTENDING CONTRIBUTION TO CARE
Unknown Quang Hawkins DO:  patient seen and evaluated with the PA.  I was present for key portions of the History & Physical, and I agree with the Impression & Plan. 51 yo f pmh htn, pw cp. reports two days of sx, exer, while having a "Cleaning spree". left sided dull, radiates to left shoulder/arm/neck. similar pain one year ago and had ct coronary w/ mild calcifications, recommended statin which pt did not take. reports mild crook w/ exer over last few months. in ed pain improved. hx cad in father and mother, father had stent in his 50s. denies n/v, cough, f/c, trauma. no ocp use. no leg swelling. arrives hds, well appearing. rrr, lungs ctabl. pp 2+, no le swelling b/l. high risk for cad. likely to be placed in cdu. ekg nsr, non ischemic.

## 2021-10-07 NOTE — ED ADULT NURSE NOTE - OBJECTIVE STATEMENT
Pt awake, alert and oriented x 4 c/o chest pain since yesterday after strenuous activity worsening today.   c/o exertional dyspnea.   Respirations even and unlabored at rest.    Denies recent fever, n/v/d.    IV placed, blood work sent and aspirin given.   awaiting further plan of care.

## 2021-10-07 NOTE — ED PROVIDER NOTE - CLINICAL SUMMARY MEDICAL DECISION MAKING FREE TEXT BOX
HPI- Patient is a 50 y.o female with PMHx of HTN, anxiety, asthma who presents to ED c/o Lt side chest pain x 1 day. Pt states pain initially started last night but was dull pain. Then today after doing cleaning she states pain became very sharp, states pain mostly Lt sided but radiates to midsternum and under Lt breast toward back. Pt also notes some Lt arm tingling sensation and now having Lt jaw pain. Pt states she tried taking her xanax 0.5 mg and tylenol but had little relief. Pt admits to having cardiac work up 1 year ago in which she had stress test and was referred to Cardiologist Dr. Didier Thomas. Pt had CT coronaries, results are in chart showing there was "punctate focus of calcium mid LAD w/o narrowing". Pt was offered a statin but declined. Pt also mentions having slight HA and neck pain yesterday. Denies fevers, chills, recent travel, cough ,congestion, LE swelling, hormone therapy, abd pain, n/v/d, dizziness, changes in vision, trauma/falls or any other complaints.    DDx- MSK pain vs anxiety vs acs. Plan for labs, xray, ecg, asa, tele. Plan for CDU vs admit for cardiac work up

## 2021-10-07 NOTE — ED PROVIDER NOTE - PHYSICAL EXAMINATION
Vital signs reviewed.   CONSTITUTIONAL: Well-appearing; well-nourished; in no apparent distress. Non-toxic appearing.   HEAD: Normocephalic, atraumatic.  EYES: PERRL, EOM intact, conjunctiva and sclera WNL.  ENT: normal nose; no rhinorrhea;   NECK/LYMPH: Supple; non-tender;  CARD: Normal S1, S2; no murmurs, rubs, or gallops noted.  RESP: Normal chest excursion with respiration; breath sounds clear and equal bilaterally; no wheezes, rhonchi, or rales.  ABD/GI: soft, non-distended; non-tender  EXT/MS: moves all extremities; distal pulses are normal, no pedal edema. No midline tenderness.   SKIN: Normal for age and race;   NEURO: Awake, alert, oriented x 3, no gross deficits, no facial droop, no drift,  no motor or sensory deficit noted.  PSYCH: Normal mood; appropriate affect.

## 2021-10-07 NOTE — ED PROVIDER NOTE - OBJECTIVE STATEMENT
HPI- Patient is a 50 y.o female with PMHx of HTN, anxiety, asthma who presents to ED c/o Lt side chest pain x 1 day. Pt states pain initially started last night but was dull pain. Then today after doing cleaning she states pain became very sharp, states pain mostly Lt sided but radiates to midsternum and under Lt breast toward back. Pt also notes some Lt arm tingling sensation and now having Lt jaw pain. Pt states she tried taking her xanax 0.5 mg and tylenol but had little relief. Pt admits to having cardiac work up 1 year ago in which she had stress test and was referred to Cardiologist Dr. Didier Thomas. Pt had CT coronaries, results are in chart showing there was "punctate focus of calcium mid LAD w/o narrowing". Pt was offered a statin but declined. Pt also mentions having slight HA and neck pain yesterday. Denies fevers, chills, recent travel, cough ,congestion, LE swelling, hormone therapy, abd pain, n/v/d, dizziness, changes in vision, trauma/falls or any other complaints.

## 2021-10-08 VITALS
SYSTOLIC BLOOD PRESSURE: 119 MMHG | HEART RATE: 71 BPM | DIASTOLIC BLOOD PRESSURE: 78 MMHG | OXYGEN SATURATION: 100 % | RESPIRATION RATE: 18 BRPM | TEMPERATURE: 99 F

## 2021-10-08 LAB
ANION GAP SERPL CALC-SCNC: 11 MMOL/L — SIGNIFICANT CHANGE UP (ref 7–14)
CHLORIDE SERPL-SCNC: 103 MMOL/L — SIGNIFICANT CHANGE UP (ref 98–107)
POTASSIUM SERPL-MCNC: 3.5 MMOL/L — SIGNIFICANT CHANGE UP (ref 3.5–5.3)
POTASSIUM SERPL-MCNC: 3.5 MMOL/L — SIGNIFICANT CHANGE UP (ref 3.5–5.3)
POTASSIUM SERPL-SCNC: 3.5 MMOL/L — SIGNIFICANT CHANGE UP (ref 3.5–5.3)
POTASSIUM SERPL-SCNC: 3.5 MMOL/L — SIGNIFICANT CHANGE UP (ref 3.5–5.3)
SARS-COV-2 RNA SPEC QL NAA+PROBE: SIGNIFICANT CHANGE UP
SODIUM SERPL-SCNC: 136 MMOL/L — SIGNIFICANT CHANGE UP (ref 135–145)

## 2021-10-08 PROCEDURE — 78452 HT MUSCLE IMAGE SPECT MULT: CPT | Mod: 26,ME

## 2021-10-08 PROCEDURE — 93016 CV STRESS TEST SUPVJ ONLY: CPT | Mod: GC

## 2021-10-08 PROCEDURE — 99217: CPT

## 2021-10-08 PROCEDURE — 93018 CV STRESS TEST I&R ONLY: CPT | Mod: GC

## 2021-10-08 RX ORDER — AMLODIPINE BESYLATE 2.5 MG/1
5 TABLET ORAL DAILY
Refills: 0 | Status: DISCONTINUED | OUTPATIENT
Start: 2021-10-08 | End: 2021-10-11

## 2021-10-08 RX ORDER — AMLODIPINE BESYLATE 2.5 MG/1
5 TABLET ORAL DAILY
Refills: 0 | Status: DISCONTINUED | OUTPATIENT
Start: 2021-10-08 | End: 2021-10-08

## 2021-10-08 RX ADMIN — AMLODIPINE BESYLATE 5 MILLIGRAM(S): 2.5 TABLET ORAL at 05:14

## 2021-10-08 NOTE — ED CDU PROVIDER SUBSEQUENT DAY NOTE - PROGRESS NOTE DETAILS
Pt reports resolutions of chest pain.  Case and work up thus far discussed with pt's cardiologist, Dr. Didier Thomas, who recommends discharge and outpatient follow up with him if stress test unremarkable.  He requests admission to house cardiology if stress test abnormal.  Awaiting stress test results at this time. Stress test normal.  Pt medically stable for discharge.  Strict return precautions given. Pt to follow up with PMD and cardiologist Dr. Didier Thomas.  Reassessment performed and plan for discharge discussed with Dr. Long Thomas who agrees with disposition and discharge plan.

## 2021-10-08 NOTE — ED CDU PROVIDER SUBSEQUENT DAY NOTE - ATTENDING CONTRIBUTION TO CARE
50 year old female with a history of HTN, anxiety, and asthma presented to the ED with L sided chest pain x 1 day. EKG and initial labs noted and pt was placed in observation area for diagnostic uncertainty. Serial Trop, echo and stress test performed and case discussed with cardiology. Likely dc with close outpatient follow up.

## 2021-10-08 NOTE — ED CDU PROVIDER DISPOSITION NOTE - PATIENT PORTAL LINK FT
You can access the FollowMyHealth Patient Portal offered by [facility] by registering at the following website: [email or web address]. By joining [facility] [employer] [facility]’s FollowMyHealth portal, you will also be able to view your health information using other applications (apps) compatible with our system.

## 2021-10-08 NOTE — ED CDU PROVIDER SUBSEQUENT DAY NOTE - HISTORY
49 y/o F PMH HTN, anxiety, asthma presented to the ED c/o L sided chest pain x 1 day. Pt states pain began last night. Today while cleaning pt developed the pain again and has persisted all day. States pain is sharp/numb, radiating to shoulder, back, jaw with tingling in L arm. Pt took xanax 0.5 mg and tylenol but had little relief. Pt admits to having cardiac work up 1 year ago in which she had stress test and was referred to Cardiologist Dr. Didier Thomas. Pt had CT coronaries, results are in chart showing there was "punctate focus of calcium mid LAD w/o narrowing". Pt reports she has been cleaning more than usual lately due to upcoming holidays. Denies recent illness fevers, chills, recent travel, cough , sob, palpitations, LE swelling, abd pain, n/v/d.  	In ED, labs wnl, ekg nsr nonischemic, cxr shows clear lungs. Trop <6, <6.  Pt placed in CDU for observation, tele, and stress test

## 2021-10-08 NOTE — ED ADULT NURSE REASSESSMENT NOTE - NS ED NURSE REASSESS COMMENT FT1
A&Ox4. NAD pt denies cp, dizziness, sob, n/v. respirations are even and unlabored. safety precautions maintained.

## 2021-10-08 NOTE — ED CDU PROVIDER DISPOSITION NOTE - CLINICAL COURSE
51 y/o F PMH HTN, anxiety, asthma presented to the ED c/o L sided chest pain x 1 day. Pt states pain began last night. Today while cleaning pt developed the pain again and has persisted all day. States pain is sharp/numb, radiating to shoulder, back, jaw with tingling in L arm. Pt took xanax 0.5 mg and tylenol but had little relief. Pt admits to having cardiac work up 1 year ago in which she had stress test and was referred to Cardiologist Dr. Didier Thomas. Pt had CT coronaries, results are in chart showing there was "punctate focus of calcium mid LAD w/o narrowing". Pt reports she has been cleaning more than usual lately due to upcoming holidays. Denies recent illness fevers, chills, recent travel, cough , sob, palpitations, LE swelling, abd pain, n/v/d.  	In ED, labs wnl, ekg nsr nonischemic, cxr shows clear lungs. Trop <6, <6.  Pt placed in CDU for observation, tele, and stress test

## 2021-10-08 NOTE — ED CDU PROVIDER INITIAL DAY NOTE - OBJECTIVE STATEMENT
51 y/o F PMH HTN, anxiety, asthma presented to the ED c/o L sided chest pain x 1 day. Pt states pain began last night. Today while cleaning pt developed the pain again and has persisted all day. States pain is sharp/numb, radiating to shoulder, back, jaw with tingling in L arm. Pt took xanax 0.5 mg and tylenol but had little relief. Pt admits to having cardiac work up 1 year ago in which she had stress test and was referred to Cardiologist Dr. Didier Thomas. Pt had CT coronaries, results are in chart showing there was "punctate focus of calcium mid LAD w/o narrowing". Pt reports she has been cleaning more than usual lately due to upcoming holidays. Denies recent illness fevers, chills, recent travel, cough , sob, palpitations, LE swelling, abd pain, n/v/d.  In ED, labs wnl, ekg nsr nonischemic, cxr shows clear lungs. Trop <6, <6.  Pt placed in CDU for observation, tele, and stress test 51 y/o F PMH HTN, anxiety, asthma presented to the ED c/o L sided chest pain x 1 day. Pt states pain began last night. Today while cleaning pt developed the pain again and has persisted all day. States pain is sharp/numb, radiating to shoulder, back, jaw with tingling in L arm. Pt took xanax 0.5 mg and tylenol but had little relief. Pt admits to having cardiac work up 1 year ago in which she had stress test and was referred to Cardiologist Dr. Didier Thomas. Pt had CT coronaries, results are in chart showing there was "punctate focus of calcium mid LAD w/o narrowing". Pt reports she has been cleaning more than usual lately due to upcoming holidays. Denies recent illness fevers, chills, recent travel, cough , sob, palpitations, LE swelling, abd pain, n/v/d. Reports dad has cad and has a stent.  In ED, labs wnl, ekg nsr nonischemic, cxr shows clear lungs. Trop <6, <6.  Pt placed in CDU for observation, tele, and stress test

## 2021-10-08 NOTE — ED CDU PROVIDER INITIAL DAY NOTE - ATTENDING CONTRIBUTION TO CARE
Quang Hawkins DO:  patient seen and evaluated with the PA.  I was present for key portions of the History & Physical, and I agree with the Impression & Plan. 49 yo f pmh htn, pw cp. reports two days of sx, exer, while having a "Cleaning spree". left sided dull, radiates to left shoulder/arm/neck. similar pain one year ago and had ct coronary w/ mild calcifications, recommended statin which pt did not take. reports mild crook w/ exer over last few months. in ed pain improved. hx cad in father and mother, father had stent in his 50s. denies n/v, cough, f/c, trauma. no ocp use. no leg swelling. arrives hds, well appearing. rrr, lungs ctabl. pp 2+, no le swelling b/l. high risk for cad. ekg nsr, non ischemic. cdu to [facility].

## 2021-10-08 NOTE — ED CDU PROVIDER DISPOSITION NOTE - NSFOLLOWUPINSTRUCTIONS_ED_ALL_ED_FT
Advance activity as tolerated.  Continue all previously prescribed medications as directed unless otherwise instructed.  Follow up with your primary care physician and cardiologist Dr. Didier Thomas in 48-72 hours- bring copies of your results.  Return to the ER for worsening or persistent symptoms, including but not limited to worsening/persistent pain, shortness of breath, palpitations, passing out, and/or ANY NEW OR CONCERNING SYMPTOMS. If you have issues obtaining follow up, please call: [phone] to obtain a doctor or specialist who takes your insurance in your area.  You may call [phone] to make an appointment with the internal medicine clinic.

## 2021-10-25 ENCOUNTER — LABORATORY RESULT (OUTPATIENT)
Age: 51
End: 2021-10-25

## 2021-10-25 ENCOUNTER — APPOINTMENT (OUTPATIENT)
Dept: GASTROENTEROLOGY | Facility: CLINIC | Age: 51
End: 2021-10-25
Payer: COMMERCIAL

## 2021-10-25 PROCEDURE — 83014 H PYLORI DRUG ADMIN: CPT

## 2021-10-29 ENCOUNTER — OUTPATIENT (OUTPATIENT)
Dept: OUTPATIENT SERVICES | Facility: HOSPITAL | Age: 51
LOS: 1 days | End: 2021-10-29
Payer: COMMERCIAL

## 2021-10-29 ENCOUNTER — APPOINTMENT (OUTPATIENT)
Dept: MRI IMAGING | Facility: IMAGING CENTER | Age: 51
End: 2021-10-29
Payer: COMMERCIAL

## 2021-10-29 DIAGNOSIS — Z00.8 ENCOUNTER FOR OTHER GENERAL EXAMINATION: ICD-10-CM

## 2021-10-29 DIAGNOSIS — Q04.6 CONGENITAL CEREBRAL CYSTS: ICD-10-CM

## 2021-10-29 PROCEDURE — 70553 MRI BRAIN STEM W/O & W/DYE: CPT | Mod: 26

## 2021-10-29 PROCEDURE — 70553 MRI BRAIN STEM W/O & W/DYE: CPT

## 2021-11-18 ENCOUNTER — NON-APPOINTMENT (OUTPATIENT)
Age: 51
End: 2021-11-18

## 2021-11-18 ENCOUNTER — APPOINTMENT (OUTPATIENT)
Dept: NEUROSURGERY | Facility: CLINIC | Age: 51
End: 2021-11-18
Payer: COMMERCIAL

## 2021-11-18 PROCEDURE — 99214 OFFICE O/P EST MOD 30 MIN: CPT

## 2021-11-18 NOTE — HISTORY OF PRESENT ILLNESS
[FreeTextEntry1] : Te Zuluaga is a pleasant right handed 50 year old lady of  ([place]) decent with PMH of HTN, asthma, anxiety disorder, social anxiety who first  presented to me for a consultation in 7/2019 regarding a small colloid cyst that was found incidentally on MRI brain done during an ER visit at [facility]. The symptoms did not seem to be related to the colloid cyst and it was decided to follow her with serial MRIs.\par \par On her last visit on 7/21/20, patient reported headaches about once every 2 weeks. Headaches were 7/10 and associated with nausea and blurry vision and dizziness. Headaches were gradually relieved with Tylenol as needed. Patient was advised to follow up with Dr. Med Gomez - Headache Specialist.\par \par MR brain w/wo on 10/29/21 showing similar appearing stable 5mm colloid cyst. \par Patient reports daily headaches associated with nausea and intermittent blurry vision and dizziness. Patient states she saw Dr. Gomez who recommended a headache medication but she never took it due to a fear of side effects. She is seeing a psychologist. Has been asked to see a Rheumatologist, but is having trouble getting an appointment.\par \par \par PCP Dr. Roxy Ware, [address] [place] [phone]. [phone].\par \par \par \par \par

## 2021-11-18 NOTE — DATA REVIEWED
[de-identified] : \par \par  MR Head w/wo IV Cont             Final\par \par No Documents Attached\par \par \par \par \par   EXAM:  MR BRAIN WAW IC\par \par \par PROCEDURE DATE:  10/29/2021\par \par \par \par INTERPRETATION:  Contrast-enhanced MRI of the brain.\par \par CLINICAL INDICATION: Follow-up colloid cyst.\par \par TECHNIQUE:  Multiplanar, multisequence MR images of the brain were obtained before and after the intravenous administration of 8 cc of Gadavist. 2 cc were discarded.\par \par COMPARISON: MRI brain 7/5/2020\par \par FINDINGS:\par \par Similar-appearing nonenhancing 5 mm spherical T1 isointense, T2 hypointense lesion in the region of the foramen of Monro, likely a colloid cyst.\par \par No abnormal parenchymal or leptomeningeal enhancement.\par \par No hydrocephalus, midline shift, mass effect, vasogenic edema, or acute intracranial hemorrhage.  Diffusion weighted images demonstrate no acute territorial infarct.\par \par No focal parenchymal signal abnormality.\par \par Flow voids are seen within the major intracranial vessels consistent with their patency.\par \par Scattered paranasal sinus mucosal thickening. Mastoid air cells are clear.\par \par Orbits, sellar and suprasellar structures, and craniocervical junction are unremarkable.\par \par IMPRESSION:\par \par Similar-appearing nonenhancing 5 mm spherical T1 isointense, T2 hypointense lesion in the region of the foramen of Monro, likely a colloid cyst.\par \par Continued MRI surveillance is recommended.\par \par --- End of Report ---\par \par \par \par \par \par \par KALI SAINZ MD; Attending Radiologist\par This document has been electronically signed. Nov 1 2021 12:45PM\par \par  \par \par  Ordered by: EMERALD HENRY       Collected/Examined: 29Oct2021 06:45PM       \par Verification Required       Stage: Final       \par  Performed at: [facility] the [facility]       Resulted: 01Nov2021 12:24PM       Last Updated: 01Nov2021 12:48PM       Accession: [id number]

## 2021-11-18 NOTE — CONSULT LETTER
[Dear  ___] : Dear ~IMYA, [Consult Letter:] : I had the pleasure of evaluating your patient, [unfilled]. [Please see my note below.] : Please see my note below. [Consult Closing:] : Thank you very much for allowing me to participate in the care of this patient.  If you have any questions, please do not hesitate to contact me. [Sincerely,] : Sincerely, [FreeTextEntry2] : Roxy Nixon MD\par [address]\[place] [address] [FreeTextEntry3] : Kameron Rodney MD, FACS, FAANS\par Professor and \par Department of Neurosurgery\par [facility] [employer] [facility]\par \par Clinical Offices:\par [address]\[place] [address]\par \par [address]\[place] [address]\par \par Phone [phone]\par Email: [email or web address]\par \par  [DrShamar  ___] : Dr. BENAVIDES

## 2021-11-18 NOTE — PHYSICAL EXAM
[General Appearance - Alert] : alert [General Appearance - Well Nourished] : well nourished [General Appearance - Well-Appearing] : healthy appearing [Oriented To Time, Place, And Person] : oriented to person, place, and time [Impaired Insight] : insight and judgment were intact [Affect] : the affect was normal [Mood] : the mood was normal [Memory Recent] : recent memory was not impaired [Memory Remote] : remote memory was not impaired [Person] : oriented to person [Place] : oriented to place [Time] : oriented to time [Cranial Nerves Optic (II)] : visual acuity intact bilaterally,  pupils equal round and reactive to light [Cranial Nerves Oculomotor (III)] : extraocular motion intact [Cranial Nerves Trigeminal (V)] : facial sensation intact symmetrically [Cranial Nerves Facial (VII)] : face symmetrical [Cranial Nerves Vestibulocochlear (VIII)] : hearing was intact bilaterally [Cranial Nerves Glossopharyngeal (IX)] : tongue and palate midline [Cranial Nerves Accessory (XI - Cranial And Spinal)] : head turning and shoulder shrug symmetric [Cranial Nerves Hypoglossal (XII)] : there was no tongue deviation with protrusion [Motor Tone] : muscle tone was normal in all four extremities [Motor Strength] : muscle strength was normal in all four extremities [Involuntary Movements] : no involuntary movements were seen [Sensation Tactile Decrease] : light touch was intact [Balance] : balance was intact [Sclera] : the sclera and conjunctiva were normal [PERRL With Normal Accommodation] : pupils were equal in size, round, reactive to light, with normal accommodation [Extraocular Movements] : extraocular movements were intact [Outer Ear] : the ears and nose were normal in appearance [Hearing Threshold Finger Rub Not Cavalier] : hearing was normal [Neck Appearance] : the appearance of the neck was normal [] : no respiratory distress [Exaggerated Use Of Accessory Muscles For Inspiration] : no accessory muscle use [No CVA Tenderness] : no ~M costovertebral angle tenderness [Abnormal Walk] : normal gait [Skin Color & Pigmentation] : normal skin color and pigmentation [General Appearance - In No Acute Distress] : in no acute distress

## 2021-11-18 NOTE — ASSESSMENT
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 No change in size over 15 months. Repeat MR brain 10/29/21 - stable, similar appearing 5 mm colloid cyst\par 2- Chronic headaches - likely migraine \par 3- Seen by Ophthalmologist Dr. Metz - reported no significant findings\par \par \par PLAN:\par - Advised to follow up with headache specialist Dr. Gomez and to follow up on his recommendations \par - Will repeat MRI brain in one year\par \par Kameron Rodney MD, FACS, FAANS\par Professor and \par Department of Neurosurgery\par [facility] [employer] [facility] at [facility]\par \par Clinical Offices:\par [address]\[place] [address]\par \par [address]\[place] [address]\par \par Phone [phone]\par Email: [email or web address]\par \par  \par \par \par \par \par

## 2021-11-22 ENCOUNTER — APPOINTMENT (OUTPATIENT)
Dept: PAIN MANAGEMENT | Facility: CLINIC | Age: 51
End: 2021-11-22
Payer: COMMERCIAL

## 2021-11-22 VITALS
BODY MASS INDEX: 33.13 KG/M2 | SYSTOLIC BLOOD PRESSURE: 141 MMHG | WEIGHT: 180 LBS | HEART RATE: 90 BPM | DIASTOLIC BLOOD PRESSURE: 84 MMHG | HEIGHT: 62 IN

## 2021-11-22 DIAGNOSIS — M54.9 DORSALGIA, UNSPECIFIED: ICD-10-CM

## 2021-11-22 PROCEDURE — 99214 OFFICE O/P EST MOD 30 MIN: CPT

## 2021-11-28 PROBLEM — M54.9 BACK PAIN: Status: ACTIVE | Noted: 2021-05-10

## 2021-11-28 NOTE — PHYSICAL EXAM
[General Appearance - Alert] : alert [General Appearance - Well Nourished] : well nourished [General Appearance - Well Developed] : well developed [Oriented To Time, Place, And Person] : oriented to person, place, and time [Impaired Insight] : insight and judgment were intact [Memory Recent] : recent memory was not impaired [Memory Remote] : remote memory was not impaired [Person] : oriented to person [Place] : oriented to place [Time] : oriented to time [Short Term Intact] : short term memory intact [Remote Intact] : remote memory intact [Registration Intact] : recent registration memory intact [Concentration Intact] : normal concentrating ability [Visual Intact] : visual attention was ~T not ~L decreased [Naming Objects] : no difficulty naming common objects [Writing A Sentence] : no difficulty writing a sentence [Fluency] : fluency intact [Comprehension] : comprehension intact [Reading] : reading intact [Current Events] : adequate knowledge of current events [Past History] : adequate knowledge of personal past history [Cranial Nerves Oculomotor (III)] : extraocular motion intact [Cranial Nerves Facial (VII)] : face symmetrical [Cranial Nerves Vestibulocochlear (VIII)] : hearing was intact bilaterally [Cranial Nerves Accessory (XI - Cranial And Spinal)] : head turning and shoulder shrug symmetric [Cranial Nerves Hypoglossal (XII)] : there was no tongue deviation with protrusion [No Muscle Atrophy] : normal bulk in all four extremities [Motor Handedness Right-Handed] : the patient is right hand dominant [Motor Strength Upper Extremities Bilaterally] : strength was normal in both upper extremities [Sensation Tactile Decrease] : light touch was intact [Allodynia] : no ~T allodynia present [Past-pointing] : there was no past-pointing [Tremor] : no tremor present [Dysdiadochokinesia Bilaterally] : not present [Coordination - Dysmetria Impaired Finger-to-Nose Bilateral] : not present [Sclera] : the sclera and conjunctiva were normal [No NGUYEN] : no internuclear ophthalmoplegia [Strabismus] : no strabismus was seen [Outer Ear] : the ears and nose were normal in appearance [Neck Cervical Mass (___cm)] : no neck mass was observed [Exaggerated Use Of Accessory Muscles For Inspiration] : no accessory muscle use [Abnormal Walk] : normal gait [Nail Clubbing] : no clubbing  or cyanosis of the fingernails [Involuntary Movements] : no involuntary movements were seen [Skin Color & Pigmentation] : normal skin color and pigmentation [] : no rash

## 2021-11-28 NOTE — ASSESSMENT
[FreeTextEntry1] : Reinforced trial of nortriptyline for headache, mood and sleep.  May consider use of SNRI.  May consider use of cgrp antibody if concerned re: se's with meds.\par consider vitmain therapy\par referral to [facility] website.

## 2021-11-28 NOTE — HISTORY OF PRESENT ILLNESS
[FreeTextEntry1] : Pt notes she is continuing to have daily throbbing, burning, cold feeling to top of head.  Did not start with the treatment recommended at last visit.\par Still with nausea, dizziness and visual changes.\par Did have her vision checked and opthalmology note on chart from June.\par Often wakes with headache.\par Does have difficulty falling back asleep.\par Sometimes wakes feeling rested.\par SOme anxiety and rumination at night.\par Some panic attacks.\par Does take lorazepam prn.\par  [Chronic Headache] : chronic headache [Dizziness] : dizziness [Neck Pain] : neck pain [Scalp Tenderness] : scalp tenderness [Daily] : daily [> 4 hours] : > 4 hours [stayed the same] : stayed the same [Worsened] : The patient reports ~his/her~ symptoms since the last visit have worsened [de-identified] : visual changes

## 2021-11-28 NOTE — REVIEW OF SYSTEMS
[Fever] : no fever [Chills] : no chills [Feeling Poorly] : feeling poorly [Feeling Tired] : feeling tired [Eye Pain] : eye pain [Eyesight Problems] : eyesight problems [Nasal Discharge] : no nasal discharge [Chest Pain] : no chest pain [Cough] : no cough [Arthralgias] : arthralgias [Neck Pain] : neck pain [Skin Lesions] : no skin lesions [Itching] : no itching [Convulsions] : no convulsions [Fainting] : no fainting [As Noted in HPI] : as noted in HPI [Sleep Disturbances] : sleep disturbances [Muscle Weakness] : no muscle weakness

## 2021-11-29 ENCOUNTER — APPOINTMENT (OUTPATIENT)
Dept: OBGYN | Facility: CLINIC | Age: 51
End: 2021-11-29
Payer: COMMERCIAL

## 2021-11-29 VITALS
SYSTOLIC BLOOD PRESSURE: 142 MMHG | WEIGHT: 174 LBS | BODY MASS INDEX: 32.02 KG/M2 | HEIGHT: 62 IN | DIASTOLIC BLOOD PRESSURE: 88 MMHG

## 2021-11-29 PROCEDURE — 99386 PREV VISIT NEW AGE 40-64: CPT

## 2021-11-29 PROCEDURE — 36415 COLL VENOUS BLD VENIPUNCTURE: CPT

## 2021-11-29 NOTE — HISTORY OF PRESENT ILLNESS
[Currently Active] : currently active [Men] : men [Vaginal] : vaginal [No] : No [FreeTextEntry1] : 51yo P1 LMP 23 here for establishment of care.  Reports more painful periods and passing clots during the menses.\par \par  x 1

## 2021-11-29 NOTE — DISCUSSION/SUMMARY
[FreeTextEntry1] : - Pap/HPV obtained today\par - Contraceptive options reviewed; pt using condoms\par - Mammo/Sono requisition given\par - Colonoscopy screening up to date\par - Hormone panel sent secondary to heavier periods w/clots\par - return for pelvic sono to evaluate causes of AUB\par - Behavioral Health referral given for h/o anxiety\par

## 2021-11-29 NOTE — PLAN
[FreeTextEntry1] : - Pap/HPV obtained today\par - Contraceptive options reviewed; pt using condoms\par - Mammo/Sono requisition given\par - Colonoscopy screening up to date\par - hormone panel sent for AUB; pt to return for sono secondary to AUB

## 2021-11-30 LAB
DHEA-S SERPL-MCNC: 30.8 UG/DL
ESTRADIOL SERPL-MCNC: 57 PG/ML
FSH SERPL-MCNC: 5.3 IU/L
LH SERPL-ACNC: 6.2 IU/L
PROGEST SERPL-MCNC: 2.8 NG/ML
PROLACTIN SERPL-MCNC: 19.5 NG/ML
T4 FREE SERPL-MCNC: 1.2 NG/DL
TESTOST SERPL-MCNC: <2.5 NG/DL
TSH SERPL-ACNC: 1.72 UIU/ML

## 2021-12-02 LAB — HPV HIGH+LOW RISK DNA PNL CVX: NOT DETECTED

## 2021-12-03 LAB — CYTOLOGY CVX/VAG DOC THIN PREP: NORMAL

## 2021-12-10 ENCOUNTER — OUTPATIENT (OUTPATIENT)
Dept: OUTPATIENT SERVICES | Facility: HOSPITAL | Age: 51
LOS: 1 days | End: 2021-12-10

## 2021-12-10 DIAGNOSIS — Z00.8 ENCOUNTER FOR OTHER GENERAL EXAMINATION: ICD-10-CM

## 2021-12-15 ENCOUNTER — APPOINTMENT (OUTPATIENT)
Dept: RHEUMATOLOGY | Facility: CLINIC | Age: 51
End: 2021-12-15
Payer: COMMERCIAL

## 2021-12-15 VITALS
TEMPERATURE: 97.1 F | RESPIRATION RATE: 16 BRPM | HEART RATE: 88 BPM | OXYGEN SATURATION: 98 % | HEIGHT: 62 IN | WEIGHT: 176 LBS | SYSTOLIC BLOOD PRESSURE: 126 MMHG | DIASTOLIC BLOOD PRESSURE: 87 MMHG | BODY MASS INDEX: 32.39 KG/M2

## 2021-12-15 DIAGNOSIS — Z86.16 PERSONAL HISTORY OF COVID-19: ICD-10-CM

## 2021-12-15 PROCEDURE — 99204 OFFICE O/P NEW MOD 45 MIN: CPT | Mod: GC

## 2021-12-23 ENCOUNTER — NON-APPOINTMENT (OUTPATIENT)
Age: 51
End: 2021-12-23

## 2021-12-23 LAB
ANA PAT FLD IF-IMP: ABNORMAL
ANA SER IF-ACNC: ABNORMAL
APPEARANCE: ABNORMAL
B2 GLYCOPROT1 AB SER QL: NEGATIVE
BACTERIA: ABNORMAL
BILIRUBIN URINE: NEGATIVE
BLOOD URINE: NEGATIVE
CARDIOLIPIN AB SER IA-ACNC: NEGATIVE
CENTROMERE IGG SER-ACNC: <0.2 CD:130001892
COLOR: COLORLESS
CREAT SPEC-SCNC: 29 MG/DL
CREAT/PROT UR: NORMAL RATIO
DSDNA AB SER-ACNC: <12 IU/ML
ENA RNP AB SER IA-ACNC: 0.6 AL
ENA SCL70 IGG SER IA-ACNC: <0.2 AL
ENA SM AB SER IA-ACNC: <0.2 AL
ENA SS-A AB SER IA-ACNC: 0.5 AL
ENA SS-B AB SER IA-ACNC: <0.2 AL
GLUCOSE QUALITATIVE U: NEGATIVE
HYALINE CASTS: 3 /LPF
KETONES URINE: NEGATIVE
LEUKOCYTE ESTERASE URINE: ABNORMAL
MICROSCOPIC-UA: NORMAL
NITRITE URINE: NEGATIVE
PH URINE: 6.5
PROT UR-MCNC: <4 MG/DL
PROTEIN URINE: NEGATIVE
RED BLOOD CELLS URINE: 7 /HPF
RNA POLYMERASE III IGG: 13 UNITS
SPECIFIC GRAVITY URINE: 1.01
SQUAMOUS EPITHELIAL CELLS: 4 /HPF
THYROGLOB AB SERPL-ACNC: 57.9 IU/ML
THYROPEROXIDASE AB SERPL IA-ACNC: 30.6 IU/ML
UROBILINOGEN URINE: NORMAL
WHITE BLOOD CELLS URINE: 4 /HPF

## 2022-01-03 ENCOUNTER — APPOINTMENT (OUTPATIENT)
Dept: PULMONOLOGY | Facility: CLINIC | Age: 52
End: 2022-01-03

## 2022-01-10 ENCOUNTER — OUTPATIENT (OUTPATIENT)
Dept: OUTPATIENT SERVICES | Facility: HOSPITAL | Age: 52
LOS: 1 days | End: 2022-01-10
Payer: COMMERCIAL

## 2022-01-10 ENCOUNTER — RESULT REVIEW (OUTPATIENT)
Age: 52
End: 2022-01-10

## 2022-01-10 ENCOUNTER — APPOINTMENT (OUTPATIENT)
Dept: MAMMOGRAPHY | Facility: IMAGING CENTER | Age: 52
End: 2022-01-10
Payer: COMMERCIAL

## 2022-01-10 ENCOUNTER — APPOINTMENT (OUTPATIENT)
Dept: ULTRASOUND IMAGING | Facility: IMAGING CENTER | Age: 52
End: 2022-01-10
Payer: COMMERCIAL

## 2022-01-10 DIAGNOSIS — Z00.8 ENCOUNTER FOR OTHER GENERAL EXAMINATION: ICD-10-CM

## 2022-01-10 PROCEDURE — 76641 ULTRASOUND BREAST COMPLETE: CPT

## 2022-01-10 PROCEDURE — 77063 BREAST TOMOSYNTHESIS BI: CPT | Mod: 26

## 2022-01-10 PROCEDURE — 77067 SCR MAMMO BI INCL CAD: CPT | Mod: 26

## 2022-01-10 PROCEDURE — 76641 ULTRASOUND BREAST COMPLETE: CPT | Mod: 26,50

## 2022-01-10 PROCEDURE — 77067 SCR MAMMO BI INCL CAD: CPT

## 2022-01-10 PROCEDURE — 77063 BREAST TOMOSYNTHESIS BI: CPT

## 2022-01-11 ENCOUNTER — TRANSCRIPTION ENCOUNTER (OUTPATIENT)
Age: 52
End: 2022-01-11

## 2022-01-12 ENCOUNTER — TRANSCRIPTION ENCOUNTER (OUTPATIENT)
Age: 52
End: 2022-01-12

## 2022-01-13 ENCOUNTER — TRANSCRIPTION ENCOUNTER (OUTPATIENT)
Age: 52
End: 2022-01-13

## 2022-01-13 ENCOUNTER — EMERGENCY (EMERGENCY)
Facility: HOSPITAL | Age: 52
LOS: 1 days | Discharge: HOME | End: 2022-01-13
Attending: EMERGENCY MEDICINE | Admitting: EMERGENCY MEDICINE
Payer: COMMERCIAL

## 2022-01-13 VITALS
RESPIRATION RATE: 16 BRPM | DIASTOLIC BLOOD PRESSURE: 78 MMHG | OXYGEN SATURATION: 100 % | TEMPERATURE: 98 F | SYSTOLIC BLOOD PRESSURE: 146 MMHG | HEIGHT: 63 IN | HEART RATE: 85 BPM

## 2022-01-13 VITALS
DIASTOLIC BLOOD PRESSURE: 63 MMHG | RESPIRATION RATE: 20 BRPM | HEART RATE: 66 BPM | SYSTOLIC BLOOD PRESSURE: 109 MMHG | OXYGEN SATURATION: 100 % | TEMPERATURE: 98 F

## 2022-01-13 LAB
ALBUMIN SERPL ELPH-MCNC: 4.3 G/DL — SIGNIFICANT CHANGE UP (ref 3.3–5)
ALP SERPL-CCNC: 85 U/L — SIGNIFICANT CHANGE UP (ref 40–120)
ALT FLD-CCNC: 30 U/L — SIGNIFICANT CHANGE UP (ref 4–33)
ANION GAP SERPL CALC-SCNC: 8 MMOL/L — SIGNIFICANT CHANGE UP (ref 7–14)
AST SERPL-CCNC: 30 U/L — SIGNIFICANT CHANGE UP (ref 4–32)
BASOPHILS # BLD AUTO: 0.02 K/UL — SIGNIFICANT CHANGE UP (ref 0–0.2)
BASOPHILS NFR BLD AUTO: 0.2 % — SIGNIFICANT CHANGE UP (ref 0–2)
BILIRUB SERPL-MCNC: 0.2 MG/DL — SIGNIFICANT CHANGE UP (ref 0.2–1.2)
BUN SERPL-MCNC: 12 MG/DL — SIGNIFICANT CHANGE UP (ref 7–23)
CALCIUM SERPL-MCNC: 9.6 MG/DL — SIGNIFICANT CHANGE UP (ref 8.4–10.5)
CHLORIDE SERPL-SCNC: 102 MMOL/L — SIGNIFICANT CHANGE UP (ref 98–107)
CO2 SERPL-SCNC: 26 MMOL/L — SIGNIFICANT CHANGE UP (ref 22–31)
CREAT SERPL-MCNC: 0.64 MG/DL — SIGNIFICANT CHANGE UP (ref 0.5–1.3)
EOSINOPHIL # BLD AUTO: 0.19 K/UL — SIGNIFICANT CHANGE UP (ref 0–0.5)
EOSINOPHIL NFR BLD AUTO: 1.9 % — SIGNIFICANT CHANGE UP (ref 0–6)
FLUAV AG NPH QL: SIGNIFICANT CHANGE UP
FLUBV AG NPH QL: SIGNIFICANT CHANGE UP
GLUCOSE SERPL-MCNC: 104 MG/DL — HIGH (ref 70–99)
HCG SERPL-ACNC: <5 MIU/ML — SIGNIFICANT CHANGE UP
HCT VFR BLD CALC: 43.2 % — SIGNIFICANT CHANGE UP (ref 34.5–45)
HGB BLD-MCNC: 13.4 G/DL — SIGNIFICANT CHANGE UP (ref 11.5–15.5)
IANC: 6.85 K/UL — SIGNIFICANT CHANGE UP (ref 1.5–8.5)
IMM GRANULOCYTES NFR BLD AUTO: 0.3 % — SIGNIFICANT CHANGE UP (ref 0–1.5)
LYMPHOCYTES # BLD AUTO: 1.9 K/UL — SIGNIFICANT CHANGE UP (ref 1–3.3)
LYMPHOCYTES # BLD AUTO: 19.5 % — SIGNIFICANT CHANGE UP (ref 13–44)
MCHC RBC-ENTMCNC: 24.2 PG — LOW (ref 27–34)
MCHC RBC-ENTMCNC: 31 GM/DL — LOW (ref 32–36)
MCV RBC AUTO: 78 FL — LOW (ref 80–100)
MONOCYTES # BLD AUTO: 0.76 K/UL — SIGNIFICANT CHANGE UP (ref 0–0.9)
MONOCYTES NFR BLD AUTO: 7.8 % — SIGNIFICANT CHANGE UP (ref 2–14)
NEUTROPHILS # BLD AUTO: 6.85 K/UL — SIGNIFICANT CHANGE UP (ref 1.8–7.4)
NEUTROPHILS NFR BLD AUTO: 70.3 % — SIGNIFICANT CHANGE UP (ref 43–77)
NRBC # BLD: 0 /100 WBCS — SIGNIFICANT CHANGE UP
NRBC # FLD: 0 K/UL — SIGNIFICANT CHANGE UP
PLATELET # BLD AUTO: 281 K/UL — SIGNIFICANT CHANGE UP (ref 150–400)
POTASSIUM SERPL-MCNC: 4.5 MMOL/L — SIGNIFICANT CHANGE UP (ref 3.5–5.3)
POTASSIUM SERPL-SCNC: 4.5 MMOL/L — SIGNIFICANT CHANGE UP (ref 3.5–5.3)
PROT SERPL-MCNC: 8.3 G/DL — SIGNIFICANT CHANGE UP (ref 6–8.3)
RBC # BLD: 5.54 M/UL — HIGH (ref 3.8–5.2)
RBC # FLD: 13.6 % — SIGNIFICANT CHANGE UP (ref 10.3–14.5)
RSV RNA NPH QL NAA+NON-PROBE: SIGNIFICANT CHANGE UP
SARS-COV-2 RNA SPEC QL NAA+PROBE: SIGNIFICANT CHANGE UP
SODIUM SERPL-SCNC: 136 MMOL/L — SIGNIFICANT CHANGE UP (ref 135–145)
TROPONIN T, HIGH SENSITIVITY RESULT: <6 NG/L — SIGNIFICANT CHANGE UP
TROPONIN T, HIGH SENSITIVITY RESULT: <6 NG/L — SIGNIFICANT CHANGE UP
WBC # BLD: 9.75 K/UL — SIGNIFICANT CHANGE UP (ref 3.8–10.5)
WBC # FLD AUTO: 9.75 K/UL — SIGNIFICANT CHANGE UP (ref 3.8–10.5)

## 2022-01-13 PROCEDURE — 71046 X-RAY EXAM CHEST 2 VIEWS: CPT | Mod: 26

## 2022-01-13 PROCEDURE — 99284 EMERGENCY DEPT VISIT MOD MDM: CPT

## 2022-01-13 NOTE — ED PROVIDER NOTE - PATIENT PORTAL LINK FT
You can access the FollowMyHealth Patient Portal offered by [facility] by registering at the following website: [email or web address]. By joining [facility]’s FollowMyHealth portal, you will also be able to view your health information using other applications (apps) compatible with our system.

## 2022-01-13 NOTE — ED PROVIDER NOTE - CLINICAL SUMMARY MEDICAL DECISION MAKING FREE TEXT BOX
this is a 51 y.o female with a pMhx of HTN, HLD presented to the Ed complaining of having left sided chest pain over the past few days, intermittentingly now more consistently Chest pain-labs, ekg, trop reassess

## 2022-01-13 NOTE — ED PROVIDER NOTE - ATTENDING CONTRIBUTION TO CARE
Dr Bloch, ATTENDING MD-  I performed a face to face bedside interview with patient regarding history of present illness, review of symptoms and past medical history. I completed an independent physical exam.  I have discussed patient's plan of care with PA.   Documentation as above in the note.  Patient examined, well appearing NAD HEENT nml heart s1s2, lungs clear, abd soft nontender, extrem no edema, mod tenderness left upper chest wall, , atypical cp, not exertional. neg stress in Oct.

## 2022-01-13 NOTE — ED PROVIDER NOTE - OBJECTIVE STATEMENT
this is a 51 y.o female with a pMhx of HTN, HLD presented to the Ed complaining of having left sided chest pain over the past few days, intermittentingly now more consistently, she states that the pain radiates in the neck area, and down the arm occasionally. She states that the pain is unrelated ambulating or rest. She states that she went to her PMD whom did an ekg and told patient to go to the ED, she denies having any SOB, VALDOVINOS, abdominal pain, SOB, VALDOVINOS, nausea vomiting, diarrhea, constipation.

## 2022-01-13 NOTE — ED PROVIDER NOTE - NSFOLLOWUPINSTRUCTIONS_ED_ALL_ED_FT
Rest, drink plenty of fluids.  Advance activity as tolerated.  Continue all previously prescribed medications as directed.  Follow up with your primary care physician in 48-72 hours- bring copies of your results.  Return to the ER for worsening or persistent symptoms, and/or ANY NEW OR CONCERNING SYMPTOMS. If you have issues obtaining follow up, please call: [phone] to obtain a doctor or specialist who takes your insurance in your area.  You may call [phone] to make an appointment with the internal medicine clinic.    Please follow up with cardiology. please call [phone]  location is [address] [place] [address] 1st floor cardiology clinic

## 2022-01-13 NOTE — ED ADULT NURSE NOTE - OBJECTIVE STATEMENT
Pt presents to ED ambulatory with steady gait c/o CP since Sunday. States the pain is on her left chest radiating to left arm and neck with numbness in left finger tips and the left side of her jaw. States the pain was intermittent since sunday but for the last 2 days the pain has been constant, is usually dull but at times becomes sharp and nothing seems to make the pain better or worse. States she was told to come to the ED by her pcp. States this has happened once in the past but has never needed medication intervention. Denies any other medical complaints. See assessment. Treatment in progress.

## 2022-01-13 NOTE — ED PROVIDER NOTE - NSFOLLOWUPCLINICS_GEN_ALL_ED_FT
[facility]  Cardiology  [address] [place] [id number]  Phone: [phone]  Fax:

## 2022-01-13 NOTE — ED ADULT NURSE REASSESSMENT NOTE - NS ED NURSE REASSESS COMMENT FT1
Pt resting in stretcher in no apparent distress. Endorses chest discomfort unchanged from arrival. NSR on cardiac monitor. Pending lab results. call bell within reach. Will continue to monitor. MAGDY Varela

## 2022-01-13 NOTE — ED ADULT NURSE NOTE - NSIMPLEMENTINTERV_GEN_ALL_ED
Implemented All Universal Safety Interventions:  [place] to call system. Call bell, personal items and telephone within reach. Instruct patient to call for assistance. Room bathroom lighting operational. Non-slip footwear when patient is off stretcher. Physically safe environment: no spills, clutter or unnecessary equipment. Stretcher in lowest position, wheels locked, appropriate side rails in place.

## 2022-01-14 ENCOUNTER — TRANSCRIPTION ENCOUNTER (OUTPATIENT)
Age: 52
End: 2022-01-14

## 2022-01-18 ENCOUNTER — TRANSCRIPTION ENCOUNTER (OUTPATIENT)
Age: 52
End: 2022-01-18

## 2022-01-20 ENCOUNTER — APPOINTMENT (OUTPATIENT)
Dept: NEUROSURGERY | Facility: CLINIC | Age: 52
End: 2022-01-20

## 2022-01-24 ENCOUNTER — NON-APPOINTMENT (OUTPATIENT)
Age: 52
End: 2022-01-24

## 2022-01-24 ENCOUNTER — APPOINTMENT (OUTPATIENT)
Dept: CARDIOLOGY | Facility: CLINIC | Age: 52
End: 2022-01-24
Payer: COMMERCIAL

## 2022-01-24 VITALS
SYSTOLIC BLOOD PRESSURE: 129 MMHG | HEIGHT: 62 IN | HEART RATE: 83 BPM | WEIGHT: 180 LBS | DIASTOLIC BLOOD PRESSURE: 81 MMHG | OXYGEN SATURATION: 98 % | BODY MASS INDEX: 33.13 KG/M2

## 2022-01-24 DIAGNOSIS — R07.89 OTHER CHEST PAIN: ICD-10-CM

## 2022-01-24 PROCEDURE — 99214 OFFICE O/P EST MOD 30 MIN: CPT

## 2022-01-24 PROCEDURE — 93000 ELECTROCARDIOGRAM COMPLETE: CPT

## 2022-01-31 ENCOUNTER — APPOINTMENT (OUTPATIENT)
Dept: PSYCHIATRY | Facility: CLINIC | Age: 52
End: 2022-01-31
Payer: COMMERCIAL

## 2022-01-31 PROCEDURE — 99214 OFFICE O/P EST MOD 30 MIN: CPT | Mod: 95

## 2022-02-07 ENCOUNTER — APPOINTMENT (OUTPATIENT)
Dept: RADIOLOGY | Facility: IMAGING CENTER | Age: 52
End: 2022-02-07
Payer: COMMERCIAL

## 2022-02-07 ENCOUNTER — APPOINTMENT (OUTPATIENT)
Age: 52
End: 2022-02-07
Payer: COMMERCIAL

## 2022-02-07 ENCOUNTER — OUTPATIENT (OUTPATIENT)
Dept: OUTPATIENT SERVICES | Facility: HOSPITAL | Age: 52
LOS: 1 days | End: 2022-02-07
Payer: COMMERCIAL

## 2022-02-07 ENCOUNTER — APPOINTMENT (OUTPATIENT)
Dept: ULTRASOUND IMAGING | Facility: IMAGING CENTER | Age: 52
End: 2022-02-07
Payer: COMMERCIAL

## 2022-02-07 DIAGNOSIS — R22.1 LOCALIZED SWELLING, MASS AND LUMP, NECK: ICD-10-CM

## 2022-02-07 PROCEDURE — 72050 X-RAY EXAM NECK SPINE 4/5VWS: CPT

## 2022-02-07 PROCEDURE — 76536 US EXAM OF HEAD AND NECK: CPT

## 2022-02-07 PROCEDURE — 90791 PSYCH DIAGNOSTIC EVALUATION: CPT | Mod: 95

## 2022-02-07 PROCEDURE — 72070 X-RAY EXAM THORAC SPINE 2VWS: CPT | Mod: 26

## 2022-02-07 PROCEDURE — 76536 US EXAM OF HEAD AND NECK: CPT | Mod: 26

## 2022-02-07 PROCEDURE — 72070 X-RAY EXAM THORAC SPINE 2VWS: CPT

## 2022-02-07 PROCEDURE — 72050 X-RAY EXAM NECK SPINE 4/5VWS: CPT | Mod: 26

## 2022-02-07 NOTE — HISTORY OF PRESENT ILLNESS
[FreeTextEntry1] : Ms. Zuluaga presents for follow up. Since last being seen she notes a return of her chest pain. It is left sided. She also notes left arm numbness \par \par She gets out of breath with the treadmill and going up stairs.

## 2022-02-14 ENCOUNTER — APPOINTMENT (OUTPATIENT)
Dept: OBGYN | Facility: CLINIC | Age: 52
End: 2022-02-14
Payer: COMMERCIAL

## 2022-02-14 VITALS
SYSTOLIC BLOOD PRESSURE: 118 MMHG | BODY MASS INDEX: 32.2 KG/M2 | HEIGHT: 62 IN | WEIGHT: 175 LBS | DIASTOLIC BLOOD PRESSURE: 88 MMHG

## 2022-02-14 DIAGNOSIS — N93.9 ABNORMAL UTERINE AND VAGINAL BLEEDING, UNSPECIFIED: ICD-10-CM

## 2022-02-14 PROCEDURE — 76830 TRANSVAGINAL US NON-OB: CPT

## 2022-02-18 ENCOUNTER — APPOINTMENT (OUTPATIENT)
Age: 52
End: 2022-02-18
Payer: COMMERCIAL

## 2022-02-18 PROCEDURE — 90837 PSYTX W PT 60 MINUTES: CPT | Mod: 95

## 2022-02-21 ENCOUNTER — APPOINTMENT (OUTPATIENT)
Dept: CARDIOLOGY | Facility: CLINIC | Age: 52
End: 2022-02-21

## 2022-02-22 ENCOUNTER — APPOINTMENT (OUTPATIENT)
Dept: CARDIOLOGY | Facility: CLINIC | Age: 52
End: 2022-02-22
Payer: COMMERCIAL

## 2022-02-22 PROCEDURE — 93306 TTE W/DOPPLER COMPLETE: CPT

## 2022-02-23 ENCOUNTER — APPOINTMENT (OUTPATIENT)
Dept: GASTROENTEROLOGY | Facility: CLINIC | Age: 52
End: 2022-02-23
Payer: COMMERCIAL

## 2022-02-23 VITALS
TEMPERATURE: 97.3 F | OXYGEN SATURATION: 98 % | HEART RATE: 88 BPM | BODY MASS INDEX: 32.39 KG/M2 | DIASTOLIC BLOOD PRESSURE: 99 MMHG | WEIGHT: 176 LBS | SYSTOLIC BLOOD PRESSURE: 159 MMHG | HEIGHT: 62 IN

## 2022-02-23 DIAGNOSIS — R12 HEARTBURN: ICD-10-CM

## 2022-02-23 DIAGNOSIS — K29.70 GASTRITIS, UNSPECIFIED, W/OUT BLEEDING: ICD-10-CM

## 2022-02-23 DIAGNOSIS — B96.81 GASTRITIS, UNSPECIFIED, W/OUT BLEEDING: ICD-10-CM

## 2022-02-23 PROCEDURE — 99214 OFFICE O/P EST MOD 30 MIN: CPT

## 2022-02-28 PROBLEM — R12 HEART BURN: Status: ACTIVE | Noted: 2021-05-10

## 2022-02-28 PROBLEM — K29.70 HELICOBACTER PYLORI GASTRITIS: Status: ACTIVE | Noted: 2021-08-02

## 2022-02-28 NOTE — ASSESSMENT
[FreeTextEntry1] : Patient is status post H. pylori treatment with eradication of the H. pylori.  She is still having some symptoms including early satiety and some back pain and heartburn.  She will continue taking omeprazole 40 mg before lunch.  Sucralfate will be added to her regimen.  She did have a negative cardiac work-up.

## 2022-02-28 NOTE — HISTORY OF PRESENT ILLNESS
[FreeTextEntry1] : Patient is status post colonoscopy and upper endoscopy.  The colonoscopy was essentially normal.\par The upper endoscopy revealed evidence of gastritis.  Biopsies were consistent with H. pylori gastritis.  Patient is having less pain despite not taking her H2 blocker.  She does have some relief with belching.\par Patient is status post H. pylori treatment.  She had an H. pylori breath test that was negative after treatment.  She is complaining of some atypical chest pain associated with early satiety, back pain, regurgitation, and heartburn.  She was restarted on omeprazole with some relief of her symptoms.

## 2022-03-04 ENCOUNTER — APPOINTMENT (OUTPATIENT)
Age: 52
End: 2022-03-04
Payer: COMMERCIAL

## 2022-03-04 PROCEDURE — 90837 PSYTX W PT 60 MINUTES: CPT | Mod: 95

## 2022-03-11 ENCOUNTER — APPOINTMENT (OUTPATIENT)
Age: 52
End: 2022-03-11

## 2022-03-25 ENCOUNTER — APPOINTMENT (OUTPATIENT)
Age: 52
End: 2022-03-25
Payer: COMMERCIAL

## 2022-03-25 PROCEDURE — 90837 PSYTX W PT 60 MINUTES: CPT | Mod: 95

## 2022-04-01 ENCOUNTER — APPOINTMENT (OUTPATIENT)
Age: 52
End: 2022-04-01

## 2022-04-01 ENCOUNTER — APPOINTMENT (OUTPATIENT)
Age: 52
End: 2022-04-01
Payer: COMMERCIAL

## 2022-04-01 PROCEDURE — 90837 PSYTX W PT 60 MINUTES: CPT | Mod: 95

## 2022-04-08 ENCOUNTER — APPOINTMENT (OUTPATIENT)
Age: 52
End: 2022-04-08
Payer: COMMERCIAL

## 2022-04-08 PROCEDURE — 90837 PSYTX W PT 60 MINUTES: CPT | Mod: 95

## 2022-04-11 ENCOUNTER — NON-APPOINTMENT (OUTPATIENT)
Age: 52
End: 2022-04-11

## 2022-04-12 ENCOUNTER — LABORATORY RESULT (OUTPATIENT)
Age: 52
End: 2022-04-12

## 2022-04-12 ENCOUNTER — APPOINTMENT (OUTPATIENT)
Dept: PULMONOLOGY | Facility: CLINIC | Age: 52
End: 2022-04-12
Payer: COMMERCIAL

## 2022-04-12 ENCOUNTER — NON-APPOINTMENT (OUTPATIENT)
Age: 52
End: 2022-04-12

## 2022-04-12 VITALS
TEMPERATURE: 97.9 F | WEIGHT: 177 LBS | SYSTOLIC BLOOD PRESSURE: 144 MMHG | HEIGHT: 61 IN | OXYGEN SATURATION: 100 % | DIASTOLIC BLOOD PRESSURE: 94 MMHG | BODY MASS INDEX: 33.42 KG/M2 | HEART RATE: 71 BPM

## 2022-04-12 LAB
T3 SERPL-MCNC: 122 NG/DL
T3FREE SERPL-MCNC: 2.91 PG/ML
TSH SERPL-ACNC: 1.33 UIU/ML

## 2022-04-12 PROCEDURE — 36415 COLL VENOUS BLD VENIPUNCTURE: CPT

## 2022-04-12 PROCEDURE — 94729 DIFFUSING CAPACITY: CPT

## 2022-04-12 PROCEDURE — ZZZZZ: CPT

## 2022-04-12 PROCEDURE — 99205 OFFICE O/P NEW HI 60 MIN: CPT | Mod: 25

## 2022-04-12 PROCEDURE — 94726 PLETHYSMOGRAPHY LUNG VOLUMES: CPT

## 2022-04-12 PROCEDURE — 95012 NITRIC OXIDE EXP GAS DETER: CPT

## 2022-04-12 PROCEDURE — 94060 EVALUATION OF WHEEZING: CPT

## 2022-04-12 NOTE — HISTORY OF PRESENT ILLNESS
[TextBox_4] : This letter  is regarding your patient  who  attended pulmonary out patient office today.  I have reviewed  patient's  past history, social history, family history and medication list. I also  reviewed nurse practitioners/ and fellows  notes and assessment and agree with it.  \par The patient was referred by -------\par \par \par Non-smoker----allergic to herDYE------\par ------No history of , fever, chills , rigors, chest pain, or hemoptysis. Questionable history of Raynaud's phenomenon. No h/o significant weight loss in last few months. No history of liver dysfunction , collagen vascular disorder or chronic thromboembolic disease. I would classify the patient's dyspnea as WHO  FUNCTIONAL CLASS II--------\par \par ----Echo  date------ not available\par ----Pft date--------- minimal obstruction\par ----Ct scan date------- pending\par \par \par

## 2022-04-12 NOTE — PHYSICAL EXAM
[No Acute Distress] : no acute distress [Normal Oropharynx] : normal oropharynx [II] : Mallampati Class: II [Normal S1, S2] : normal s1, s2 [No Abnormalities] : no abnormalities [Benign] : benign [Normal Gait] : normal gait [No Clubbing] : no clubbing [Normal Color/ Pigmentation] : normal color/ pigmentation [No Focal Deficits] : no focal deficits [Oriented x3] : oriented x3 [TextBox_44] : Thyromegaly [TextBox_68] : Decreased entry at bases

## 2022-04-12 NOTE — DISCUSSION/SUMMARY
[FreeTextEntry1] : ---Assessment plan----------The patient has been referred here for further opinion regarding pulmonary problem,\par \par 51-year-old female history of bronchial asthma\par \par 1 continue Symbicort 80 x 4.5   2 puffS as needed --- proper technique was discussed with the patient\par 2 LAB\par 3  CT CHEST\par \par 4 ?R/O HYPOTHYRIDISM --PT TO D/W DR RIVERA \par \par F/U 2 MONTHS \par Thanks for allowing  me to participate  in the care of this patient.  Patient at this time  will follow  the above mentioned recommendations and return back for follow up visit. If you have any questions  I can be reached  at # [phone] (office).\par \par David Reyes MD, FCCP \par Director, Pulmonary Hypertension Program \par [facility] \par Division of Pulmonary, Critical Care and Sleep Medicine \par  Professor of Medicine \par [employer]\par \par KATALINA HopsonC\par

## 2022-04-13 ENCOUNTER — NON-APPOINTMENT (OUTPATIENT)
Age: 52
End: 2022-04-13

## 2022-04-13 LAB
ALBUMIN SERPL ELPH-MCNC: 4.2 G/DL
ALP BLD-CCNC: 93 U/L
ALT SERPL-CCNC: 23 U/L
ANION GAP SERPL CALC-SCNC: 11 MMOL/L
AST SERPL-CCNC: 20 U/L
BASOPHILS # BLD AUTO: 0.03 K/UL
BASOPHILS NFR BLD AUTO: 0.5 %
BILIRUB SERPL-MCNC: <0.2 MG/DL
BUN SERPL-MCNC: 12 MG/DL
CALCIUM SERPL-MCNC: 10 MG/DL
CHLORIDE SERPL-SCNC: 105 MMOL/L
CO2 SERPL-SCNC: 24 MMOL/L
COVID-19 NUCLEOCAPSID  GAM ANTIBODY INTERPRETATION: POSITIVE
COVID-19 SPIKE DOMAIN ANTIBODY INTERPRETATION: POSITIVE
CREAT SERPL-MCNC: 0.63 MG/DL
DEPRECATED KAPPA LC FREE/LAMBDA SER: 1.12 RATIO
EGFR: 109 ML/MIN/1.73M2
EOSINOPHIL # BLD AUTO: 0.16 K/UL
EOSINOPHIL # BLD MANUAL: 160 /UL
EOSINOPHIL NFR BLD AUTO: 2.6 %
GLUCOSE SERPL-MCNC: 91 MG/DL
HCT VFR BLD CALC: 44.1 %
HGB BLD-MCNC: 13 G/DL
IGA SER QL IEP: 330 MG/DL
IGG SER QL IEP: 1883 MG/DL
IGM SER QL IEP: 81 MG/DL
IMM GRANULOCYTES NFR BLD AUTO: 0.2 %
KAPPA LC CSF-MCNC: 2.55 MG/DL
KAPPA LC SERPL-MCNC: 2.86 MG/DL
LYMPHOCYTES # BLD AUTO: 1.69 K/UL
LYMPHOCYTES NFR BLD AUTO: 27.2 %
MAN DIFF?: NORMAL
MCHC RBC-ENTMCNC: 23.4 PG
MCHC RBC-ENTMCNC: 29.5 GM/DL
MCV RBC AUTO: 79.3 FL
MONOCYTES # BLD AUTO: 0.57 K/UL
MONOCYTES NFR BLD AUTO: 9.2 %
NEUTROPHILS # BLD AUTO: 3.76 K/UL
NEUTROPHILS NFR BLD AUTO: 60.3 %
PLATELET # BLD AUTO: 254 K/UL
POTASSIUM SERPL-SCNC: 4.7 MMOL/L
PROT SERPL-MCNC: 7.7 G/DL
RBC # BLD: 5.56 M/UL
RBC # FLD: 14.1 %
SARS-COV-2 AB SERPL IA-ACNC: >250 U/ML
SARS-COV-2 AB SERPL QL IA: 26.1 INDEX
SODIUM SERPL-SCNC: 139 MMOL/L
WBC # FLD AUTO: 6.22 K/UL

## 2022-04-15 ENCOUNTER — NON-APPOINTMENT (OUTPATIENT)
Age: 52
End: 2022-04-15

## 2022-04-15 ENCOUNTER — APPOINTMENT (OUTPATIENT)
Age: 52
End: 2022-04-15

## 2022-04-15 LAB
THYROGLOB AB SERPL-ACNC: 222 IU/ML
THYROPEROXIDASE AB SERPL IA-ACNC: 26.2 IU/ML

## 2022-04-16 ENCOUNTER — NON-APPOINTMENT (OUTPATIENT)
Age: 52
End: 2022-04-16

## 2022-04-18 LAB — TOTAL IGE SMQN RAST: 86 KU/L

## 2022-04-21 ENCOUNTER — OUTPATIENT (OUTPATIENT)
Dept: OUTPATIENT SERVICES | Facility: HOSPITAL | Age: 52
LOS: 1 days | End: 2022-04-21
Payer: COMMERCIAL

## 2022-04-21 ENCOUNTER — APPOINTMENT (OUTPATIENT)
Dept: CT IMAGING | Facility: IMAGING CENTER | Age: 52
End: 2022-04-21
Payer: COMMERCIAL

## 2022-04-21 DIAGNOSIS — Z00.8 ENCOUNTER FOR OTHER GENERAL EXAMINATION: ICD-10-CM

## 2022-04-21 PROCEDURE — 71250 CT THORAX DX C-: CPT

## 2022-04-21 PROCEDURE — 71250 CT THORAX DX C-: CPT | Mod: 26

## 2022-04-22 ENCOUNTER — APPOINTMENT (OUTPATIENT)
Age: 52
End: 2022-04-22

## 2022-04-22 LAB
A ALTERNATA IGE QN: <0.1 KUA/L
A FUMIGATUS IGE QN: <0.1 KUA/L
BERMUDA GRASS IGE QN: <0.1 KUA/L
BOXELDER IGE QN: 0.12 KUA/L
C HERBARUM IGE QN: <0.1 KUA/L
CALIF WALNUT IGE QN: 0.22 KUA/L
CAT DANDER IGE QN: <0.1 KUA/L
CMN PIGWEED IGE QN: 0.18 KUA/L
COMMON RAGWEED IGE QN: 0.15 KUA/L
COTTONWOOD IGE QN: <0.1 KUA/L
D FARINAE IGE QN: <0.1 KUA/L
D PTERONYSS IGE QN: <0.1 KUA/L
DEPRECATED A ALTERNATA IGE RAST QL: 0
DEPRECATED A FUMIGATUS IGE RAST QL: 0
DEPRECATED BERMUDA GRASS IGE RAST QL: 0
DEPRECATED BOXELDER IGE RAST QL: NORMAL
DEPRECATED C HERBARUM IGE RAST QL: 0
DEPRECATED CAT DANDER IGE RAST QL: 0
DEPRECATED COMMON PIGWEED IGE RAST QL: NORMAL
DEPRECATED COMMON RAGWEED IGE RAST QL: NORMAL
DEPRECATED COTTONWOOD IGE RAST QL: 0
DEPRECATED D FARINAE IGE RAST QL: 0
DEPRECATED D PTERONYSS IGE RAST QL: 0
DEPRECATED DOG DANDER IGE RAST QL: 0
DEPRECATED GOOSEFOOT IGE RAST QL: 0
DEPRECATED LONDON PLANE IGE RAST QL: 2
DEPRECATED MOUSE URINE PROT IGE RAST QL: 0
DEPRECATED MUGWORT IGE RAST QL: 0
DEPRECATED P NOTATUM IGE RAST QL: 0
DEPRECATED RED CEDAR IGE RAST QL: 0
DEPRECATED ROACH IGE RAST QL: 0
DEPRECATED SHEEP SORREL IGE RAST QL: NORMAL
DEPRECATED SILVER BIRCH IGE RAST QL: 3
DEPRECATED TIMOTHY IGE RAST QL: 0
DEPRECATED WHITE ASH IGE RAST QL: NORMAL
DEPRECATED WHITE OAK IGE RAST QL: 2
DOG DANDER IGE QN: <0.1 KUA/L
GOOSEFOOT IGE QN: <0.1 KUA/L
LONDON PLANE IGE QN: 0.76 KUA/L
MOUSE URINE PROT IGE QN: <0.1 KUA/L
MUGWORT IGE QN: <0.1 KUA/L
MULBERRY (T70) CLASS: 0
MULBERRY (T70) CONC: <0.1 KUA/L
P NOTATUM IGE QN: <0.1 KUA/L
RED CEDAR IGE QN: <0.1 KUA/L
ROACH IGE QN: <0.1 KUA/L
SHEEP SORREL IGE QN: 0.12 KUA/L
SILVER BIRCH IGE QN: 5.25 KUA/L
TIMOTHY IGE QN: <0.1 KUA/L
TREE ALLERG MIX1 IGE QL: NORMAL
WHITE ASH IGE QN: 0.22 KUA/L
WHITE ELM IGE QN: 0.38 KUA/L
WHITE ELM IGE QN: 1
WHITE OAK IGE QN: 2.33 KUA/L

## 2022-04-29 ENCOUNTER — APPOINTMENT (OUTPATIENT)
Age: 52
End: 2022-04-29
Payer: COMMERCIAL

## 2022-04-29 PROCEDURE — 90837 PSYTX W PT 60 MINUTES: CPT | Mod: 95

## 2022-05-06 ENCOUNTER — APPOINTMENT (OUTPATIENT)
Dept: PULMONOLOGY | Facility: CLINIC | Age: 52
End: 2022-05-06

## 2022-05-06 ENCOUNTER — APPOINTMENT (OUTPATIENT)
Age: 52
End: 2022-05-06
Payer: COMMERCIAL

## 2022-05-06 PROCEDURE — 90837 PSYTX W PT 60 MINUTES: CPT | Mod: 95

## 2022-05-13 ENCOUNTER — APPOINTMENT (OUTPATIENT)
Age: 52
End: 2022-05-13
Payer: COMMERCIAL

## 2022-05-13 PROCEDURE — 90837 PSYTX W PT 60 MINUTES: CPT | Mod: 95

## 2022-05-16 ENCOUNTER — NON-APPOINTMENT (OUTPATIENT)
Age: 52
End: 2022-05-16

## 2022-05-20 ENCOUNTER — APPOINTMENT (OUTPATIENT)
Age: 52
End: 2022-05-20
Payer: COMMERCIAL

## 2022-05-20 PROCEDURE — 90837 PSYTX W PT 60 MINUTES: CPT | Mod: 95

## 2022-05-25 ENCOUNTER — APPOINTMENT (OUTPATIENT)
Dept: GASTROENTEROLOGY | Facility: CLINIC | Age: 52
End: 2022-05-25

## 2022-05-27 ENCOUNTER — APPOINTMENT (OUTPATIENT)
Age: 52
End: 2022-05-27
Payer: COMMERCIAL

## 2022-05-27 PROCEDURE — 90837 PSYTX W PT 60 MINUTES: CPT | Mod: 95

## 2022-06-03 ENCOUNTER — APPOINTMENT (OUTPATIENT)
Age: 52
End: 2022-06-03
Payer: COMMERCIAL

## 2022-06-03 PROCEDURE — 90837 PSYTX W PT 60 MINUTES: CPT | Mod: 95

## 2022-06-06 ENCOUNTER — APPOINTMENT (OUTPATIENT)
Age: 52
End: 2022-06-06
Payer: COMMERCIAL

## 2022-06-06 ENCOUNTER — APPOINTMENT (OUTPATIENT)
Dept: ENDOCRINOLOGY | Facility: CLINIC | Age: 52
End: 2022-06-06
Payer: COMMERCIAL

## 2022-06-06 ENCOUNTER — NON-APPOINTMENT (OUTPATIENT)
Age: 52
End: 2022-06-06

## 2022-06-06 VITALS
SYSTOLIC BLOOD PRESSURE: 124 MMHG | WEIGHT: 178 LBS | HEIGHT: 61.02 IN | TEMPERATURE: 98.2 F | OXYGEN SATURATION: 99 % | HEART RATE: 83 BPM | BODY MASS INDEX: 33.61 KG/M2 | DIASTOLIC BLOOD PRESSURE: 72 MMHG

## 2022-06-06 PROCEDURE — 99203 OFFICE O/P NEW LOW 30 MIN: CPT

## 2022-06-06 PROCEDURE — 90837 PSYTX W PT 60 MINUTES: CPT | Mod: 95

## 2022-06-06 NOTE — PHYSICAL EXAM
[Alert] : alert [Well Nourished] : well nourished [Healthy Appearance] : healthy appearance [No Acute Distress] : no acute distress [Well Developed] : well developed [Normal Voice/Communication] : normal voice communication [Normal Sclera/Conjunctiva] : normal sclera/conjunctiva [No Proptosis] : no proptosis [No LAD] : no lymphadenopathy [Supple] : the neck was supple [Clear to Auscultation] : lungs were clear to auscultation bilaterally [Normal Affect] : the affect was normal [Normal Insight/Judgement] : insight and judgment were intact [Normal Mood] : the mood was normal [de-identified] : Enlarged thyroid gland with bilateral palpable thyroid nodules

## 2022-06-06 NOTE — REASON FOR VISIT
[Consultation] : a consultation visit [Thyroid nodule/ MNG] : thyroid nodule/ MNG [FreeTextEntry2] : Dr Ramsey

## 2022-06-06 NOTE — REVIEW OF SYSTEMS
[Fatigue] : fatigue [Recent Weight Gain (___ Lbs)] : recent weight gain: [unfilled] lbs [Constipation] : constipation [Hair Loss] : hair loss [Depression] : depression [Anxiety] : anxiety [All other systems negative] : All other systems negative

## 2022-06-06 NOTE — HISTORY OF PRESENT ILLNESS
[FreeTextEntry1] : CC: Thyroid nodules\par This is a 51-year-old female with multinodular goiter, Hashimoto's thyroiditis, migraines, anxiety, depression, hypertension, asthma, vitamin D insufficiency, brain cyst, panic disorder/agoraphobia, here for consultation.\par Per patient PCP noticed thyroid enlargement in February 2022.  She had neck ultrasound on February 7, 2022 which showed right lobe 6.4 x 2.1 x 2.5 cm, left lobe 5.7 x 2.4 x 2.3 cm, isthmus measuring 6 mm.\par Right lobe mostly solid isoechoic to hyperechoic nodules, largest measuring 2 x 1.7 x 1.5 cm\par Left mostly solid isoechoic nodules largest measuring 1.7 x 2.1 x 2.1 cm.  Possible thyroglossal duct cyst.\par TSH from April 2022 was 1.33.\par Thyroglobulin antibodies found to be positive on 2 separate occasions.\par She is not currently on thyroid hormone replacement.\par Denies obstructive symptoms.\par There is no history of radiation therapy to the head or neck.\par There is no family history of thyroid cancer.

## 2022-06-06 NOTE — ASSESSMENT
[FreeTextEntry1] : This is a 51-year-old female with multinodular goiter, Hashimoto's thyroiditis, migraines, anxiety, depression, hypertension, asthma, vitamin D insufficiency, brain cyst, panic disorder/agoraphobia, here for consultation.\par Per patient PCP noticed thyroid enlargement in February 2022.  She had neck ultrasound on February 7, 2022 which showed right lobe 6.4 x 2.1 x 2.5 cm, left lobe 5.7 x 2.4 x 2.3 cm, isthmus measuring 6 mm.\par Right lobe mostly solid isoechoic to hyperechoic nodules, largest measuring 2 x 1.7 x 1.5 cm.\par Left mostly solid isoechoic nodules largest measuring 1.7 x 2.1 x 2.1 cm.  Possible thyroglossal duct cyst.\par Check CT neck/soft tissues.\par Radiologist contacted for clarification on TI-RADS score.\par TSH from April 2022 was 1.33.\par Thyroglobulin antibodies found to be positive on 2 separate occasions.  Hashimoto's thyroiditis reviewed.  We will avoid starting thyroid hormone replacement at this time due to history of severe anxiety.\par

## 2022-06-07 ENCOUNTER — NON-APPOINTMENT (OUTPATIENT)
Age: 52
End: 2022-06-07

## 2022-06-07 ENCOUNTER — OUTPATIENT (OUTPATIENT)
Dept: OUTPATIENT SERVICES | Facility: HOSPITAL | Age: 52
LOS: 1 days | End: 2022-06-07
Payer: COMMERCIAL

## 2022-06-07 ENCOUNTER — APPOINTMENT (OUTPATIENT)
Age: 52
End: 2022-06-07

## 2022-06-07 DIAGNOSIS — Z00.8 ENCOUNTER FOR OTHER GENERAL EXAMINATION: ICD-10-CM

## 2022-06-07 DIAGNOSIS — E04.2 NONTOXIC MULTINODULAR GOITER: ICD-10-CM

## 2022-06-07 PROCEDURE — 70491 CT SOFT TISSUE NECK W/DYE: CPT

## 2022-06-08 ENCOUNTER — APPOINTMENT (OUTPATIENT)
Dept: ENDOCRINOLOGY | Facility: CLINIC | Age: 52
End: 2022-06-08

## 2022-06-13 RX ORDER — FLUTICASONE PROPIONATE AND SALMETEROL 250; 50 UG/1; UG/1
250-50 POWDER RESPIRATORY (INHALATION)
Qty: 3 | Refills: 0 | Status: ACTIVE | COMMUNITY
Start: 2022-04-13 | End: 1900-01-01

## 2022-06-15 ENCOUNTER — RX RENEWAL (OUTPATIENT)
Age: 52
End: 2022-06-15

## 2022-06-17 ENCOUNTER — NON-APPOINTMENT (OUTPATIENT)
Age: 52
End: 2022-06-17

## 2022-06-17 ENCOUNTER — APPOINTMENT (OUTPATIENT)
Age: 52
End: 2022-06-17
Payer: COMMERCIAL

## 2022-06-17 PROCEDURE — 90837 PSYTX W PT 60 MINUTES: CPT | Mod: 95

## 2022-06-24 ENCOUNTER — APPOINTMENT (OUTPATIENT)
Age: 52
End: 2022-06-24
Payer: COMMERCIAL

## 2022-06-24 PROCEDURE — 90837 PSYTX W PT 60 MINUTES: CPT | Mod: 95

## 2022-06-27 ENCOUNTER — NON-APPOINTMENT (OUTPATIENT)
Age: 52
End: 2022-06-27

## 2022-06-29 NOTE — ED ADULT NURSE NOTE - CAS DISCH ACCOMP BY
Pt was initially brought to [facility] ED on 06/06 after a bystander called 911 as the pt was undressing herself in public. Pt then transferred to [facility] due to concern for manic episode. 6/28/22 MR HEAD: Areas of abnormal signal involving the diffusion weighted sequence is again seen. It is unclear whether these are due to acute infarcts versus underlying lesions./fall precautions Family

## 2022-06-30 ENCOUNTER — APPOINTMENT (OUTPATIENT)
Dept: OTOLARYNGOLOGY | Facility: CLINIC | Age: 52
End: 2022-06-30

## 2022-06-30 VITALS
HEIGHT: 62 IN | BODY MASS INDEX: 33.13 KG/M2 | WEIGHT: 180 LBS | DIASTOLIC BLOOD PRESSURE: 81 MMHG | SYSTOLIC BLOOD PRESSURE: 122 MMHG | HEART RATE: 96 BPM

## 2022-06-30 DIAGNOSIS — Z78.9 OTHER SPECIFIED HEALTH STATUS: ICD-10-CM

## 2022-06-30 PROCEDURE — 99204 OFFICE O/P NEW MOD 45 MIN: CPT | Mod: 25

## 2022-06-30 PROCEDURE — 31575 DIAGNOSTIC LARYNGOSCOPY: CPT

## 2022-06-30 RX ORDER — BUDESONIDE AND FORMOTEROL FUMARATE DIHYDRATE 80; 4.5 UG/1; UG/1
80-4.5 AEROSOL RESPIRATORY (INHALATION) TWICE DAILY
Qty: 1 | Refills: 3 | Status: COMPLETED | COMMUNITY
Start: 2022-04-12 | End: 2022-06-30

## 2022-06-30 NOTE — HISTORY OF PRESENT ILLNESS
[de-identified] : This is a 51 yro patient with  multinodular goiter, Hashimoto's thyroiditis, referred by Dr. Rossi for thyroid nodule. US neck on 2/7/2022 which showed probable small upper submental midline thyroglossal duct cyst measuring 1 cm, largest nodule in right lobe  measuring 2 x 1.7 x 1.5 cm, and largest nodule in left lobe measuring 1.7 x 2.1 x 2.1 cm.  CT done 6/2022 showed enlarged, multinodular thyroid gland and Presumed prominent residual lingual tonsillar tissue. \par Pt reports occasional difficulty swallowing large pieces of food and big pills; feeling like they get stuck. Has been going on since having US done 4 months ago.  Liquids okay. \par No odynophagia, aspirations, dysphonia or dyspnea. \par Not taking thyroid medication. \par Patient denies h/o radiation and has no family h/o thyroid cancer. \par Denies smoking or etoh use. \par No biopsy done. \par LABS 4/12/2022: TSH and Free T3 wnl.\par \par \par US thyroid: 2/7/22:\par FINDINGS: Enlarged and heterogeneous thyroid gland containing multiple nodules most notable as below:\par Right Lobe: 6.4 x 2.1 x 2.5 cm\par Left Lobe: 5.7 x 2.4 x 2.3 cm\par Isthmus: 6 mm.\par \par Sonographically similar right lobe mostly solid isoechoic to hyperechoic nodules largest measures 2 x 1.7 x 1.5 cm.\par Sonographically similar left lobe mostly solid isoechoic nodules largest measures 1.7 x 2.1 x 2.1 cm\par Submental midline 0.9 x 0.9 x 1 cm lobular cystic structure could represent a thyroglossal duct cyst\par Cervical Lymph Nodes: No lymphadenopathy\par \par IMPRESSION:\par Probable small upper submental midline thyroglossal duct cyst measuring 1 cm. This could be confirmed with neck MRI.\par Multinodular thyroid gland\par TI-RAD 3: Mildly suspicious (FNA if > 2.5 cm, Follow if > 1.5 cm)\par \par \par CT neck 6/7/2022:\par IMPRESSION:\par Enlarged, multinodular thyroid gland. (A nodule within the left lower pole measures 2.1 x 1.7 x 2.1 cm).\par \par Presumed prominent residual lingual tonsillar tissue for which correlation with direct visualization is advised.\par No other cervical mass or adenopathy identified.\par \par \par

## 2022-06-30 NOTE — CONSULT LETTER
[Dear  ___] : Dear  [unfilled], [Consult Letter:] : I had the pleasure of evaluating your patient, [unfilled]. [Please see my note below.] : Please see my note below. [Consult Closing:] : Thank you very much for allowing me to participate in the care of this patient.  If you have any questions, please do not hesitate to contact me. [Sincerely,] : Sincerely, [FreeTextEntry2] : Dr Eli Rossi. [FreeTextEntry3] : \par Herb Jimenez MD, FACS\par \par Otolaryngology-Head and Neck Surgery\par [employer] [facility]\par

## 2022-08-01 ENCOUNTER — APPOINTMENT (OUTPATIENT)
Dept: CT IMAGING | Facility: IMAGING CENTER | Age: 52
End: 2022-08-01

## 2022-08-18 ENCOUNTER — NON-APPOINTMENT (OUTPATIENT)
Age: 52
End: 2022-08-18

## 2022-08-29 ENCOUNTER — RX RENEWAL (OUTPATIENT)
Age: 52
End: 2022-08-29

## 2022-09-15 ENCOUNTER — OUTPATIENT (OUTPATIENT)
Dept: OUTPATIENT SERVICES | Facility: HOSPITAL | Age: 52
LOS: 1 days | End: 2022-09-15
Payer: MEDICAID

## 2022-09-15 ENCOUNTER — APPOINTMENT (OUTPATIENT)
Dept: CT IMAGING | Facility: IMAGING CENTER | Age: 52
End: 2022-09-15

## 2022-09-15 DIAGNOSIS — Z00.8 ENCOUNTER FOR OTHER GENERAL EXAMINATION: ICD-10-CM

## 2022-09-15 DIAGNOSIS — J45.909 UNSPECIFIED ASTHMA, UNCOMPLICATED: ICD-10-CM

## 2022-09-15 PROCEDURE — 71250 CT THORAX DX C-: CPT

## 2022-09-15 PROCEDURE — 71250 CT THORAX DX C-: CPT | Mod: 26

## 2022-09-22 ENCOUNTER — APPOINTMENT (OUTPATIENT)
Dept: PULMONOLOGY | Facility: CLINIC | Age: 52
End: 2022-09-22

## 2022-09-22 VITALS
HEIGHT: 62 IN | BODY MASS INDEX: 31.9 KG/M2 | RESPIRATION RATE: 15 BRPM | TEMPERATURE: 97.8 F | DIASTOLIC BLOOD PRESSURE: 86 MMHG | OXYGEN SATURATION: 99 % | HEART RATE: 73 BPM | SYSTOLIC BLOOD PRESSURE: 146 MMHG | WEIGHT: 173.38 LBS

## 2022-09-22 DIAGNOSIS — R93.89 ABNORMAL FINDINGS ON DIAGNOSTIC IMAGING OF OTHER SPECIFIED BODY STRUCTURES: ICD-10-CM

## 2022-09-22 DIAGNOSIS — J45.909 UNSPECIFIED ASTHMA, UNCOMPLICATED: ICD-10-CM

## 2022-09-22 DIAGNOSIS — Z23 ENCOUNTER FOR IMMUNIZATION: ICD-10-CM

## 2022-09-22 PROCEDURE — 99215 OFFICE O/P EST HI 40 MIN: CPT | Mod: 25

## 2022-09-22 PROCEDURE — 90686 IIV4 VACC NO PRSV 0.5 ML IM: CPT

## 2022-09-22 PROCEDURE — 90471 IMMUNIZATION ADMIN: CPT

## 2022-09-22 RX ORDER — FLUTICASONE PROPION/SALMETEROL 250-50 MCG
250-50 BLISTER, WITH INHALATION DEVICE INHALATION
Refills: 0 | Status: DISCONTINUED | COMMUNITY
End: 2022-09-22

## 2022-09-22 RX ORDER — FLUTICASONE PROPIONATE AND SALMETEROL 50; 250 UG/1; UG/1
250-50 POWDER RESPIRATORY (INHALATION)
Qty: 3 | Refills: 1 | Status: ACTIVE | COMMUNITY
Start: 2022-08-29 | End: 1900-01-01

## 2022-09-22 RX ORDER — ALBUTEROL SULFATE 90 UG/1
108 (90 BASE) INHALANT RESPIRATORY (INHALATION)
Qty: 3 | Refills: 1 | Status: ACTIVE | COMMUNITY
Start: 2021-05-04 | End: 1900-01-01

## 2022-09-22 NOTE — DISCUSSION/SUMMARY
[FreeTextEntry1] : ---Assessment plan----------The patient has been referred here for further opinion regarding pulmonary problem,\par \par 51-year-old female history of bronchial asthma\par \par 1 continue Advair twice daily--- albuterol as needed\par 2 Labs reviewed, no eosinophilia or elevated IgE\par 3  CT CHEST reviewed, resolved nodule\par 4 Advised to use albuterol 30 minutes prior to exercise \par 5 Home sleep study\par 6 Influenza vaccine to be administered today\par \par F/U in February 2023, or earlier if symptoms not improved\par \par Thanks for allowing  me to participate  in the care of this patient.  Patient at this time  will follow  the above mentioned recommendations and return back for follow up visit. If you have any questions  I can be reached  at # [phone] (office).\par \par David Reyes MD, FCCP \par Director, Pulmonary Hypertension Program \par [facility] \par Division of Pulmonary, Critical Care and Sleep Medicine \par  Professor of Medicine \par [employer]\par \par Batool Singh ANP-C\par

## 2022-09-22 NOTE — PHYSICAL EXAM
[No Acute Distress] : no acute distress [Normal Oropharynx] : normal oropharynx [II] : Mallampati Class: II [Normal S1, S2] : normal s1, s2 [No Abnormalities] : no abnormalities [Benign] : benign [Normal Gait] : normal gait [No Clubbing] : no clubbing [Normal Color/ Pigmentation] : normal color/ pigmentation [No Focal Deficits] : no focal deficits [Oriented x3] : oriented x3 [Normal Rate/Rhythm] : normal rate/rhythm [No Murmurs] : no murmurs [TextBox_44] : Thyromegaly [TextBox_68] : Decreased entry at bases

## 2022-09-22 NOTE — HISTORY OF PRESENT ILLNESS
[TextBox_4] : This letter  is regarding your patient  who  attended pulmonary out patient office today.  I have reviewed  patient's  past history, social history, family history and medication list. I also  reviewed nurse practitioners/ and fellows  notes and assessment and agree with it.  \par The patient was referred by -------\par \par \par Non-smoker----allergic to herDYE------\par ------No history of , fever, chills , rigors, chest pain, or hemoptysis. Questionable history of Raynaud's phenomenon. No h/o significant weight loss in last few months. No history of liver dysfunction , collagen vascular disorder or chronic thromboembolic disease. I would classify the patient's dyspnea as WHO  FUNCTIONAL CLASS II--------\par \par ----Echo  date------ 2/2022: Normal pulmonary pressures, preserved LV function\par ----Pft date--------- minimal obstruction\par ----Ct scan date------- 9/15/22 Upper lobe nodule resolved\par \par 9/2022:----Asthma much better controlled-----on Advair------- albuterol as needed-------- reports feeling well. She states that sometimes she feels chest tightness with exertion and with deep inspiration. She states this feeling is alleviated with albuterol. She denies nocturnal symptoms. Ct scan reviewed, nodule resolved. Has been compliant with advair.\par

## 2022-09-29 ENCOUNTER — EMERGENCY (EMERGENCY)
Facility: HOSPITAL | Age: 52
LOS: 1 days | Discharge: HOME | End: 2022-09-29
Attending: EMERGENCY MEDICINE | Admitting: EMERGENCY MEDICINE

## 2022-09-29 VITALS
HEIGHT: 63 IN | TEMPERATURE: 98 F | DIASTOLIC BLOOD PRESSURE: 73 MMHG | OXYGEN SATURATION: 100 % | SYSTOLIC BLOOD PRESSURE: 121 MMHG | HEART RATE: 67 BPM | RESPIRATION RATE: 16 BRPM

## 2022-09-29 LAB
ALBUMIN SERPL ELPH-MCNC: 3.9 G/DL — SIGNIFICANT CHANGE UP (ref 3.3–5)
ALP SERPL-CCNC: 74 U/L — SIGNIFICANT CHANGE UP (ref 40–120)
ALT FLD-CCNC: 16 U/L — SIGNIFICANT CHANGE UP (ref 4–33)
ANION GAP SERPL CALC-SCNC: 6 MMOL/L — LOW (ref 7–14)
AST SERPL-CCNC: 16 U/L — SIGNIFICANT CHANGE UP (ref 4–32)
BASOPHILS # BLD AUTO: 0.01 K/UL — SIGNIFICANT CHANGE UP (ref 0–0.2)
BASOPHILS NFR BLD AUTO: 0.2 % — SIGNIFICANT CHANGE UP (ref 0–2)
BILIRUB SERPL-MCNC: <0.2 MG/DL — SIGNIFICANT CHANGE UP (ref 0.2–1.2)
BUN SERPL-MCNC: 10 MG/DL — SIGNIFICANT CHANGE UP (ref 7–23)
CALCIUM SERPL-MCNC: 9.1 MG/DL — SIGNIFICANT CHANGE UP (ref 8.4–10.5)
CHLORIDE SERPL-SCNC: 103 MMOL/L — SIGNIFICANT CHANGE UP (ref 98–107)
CO2 SERPL-SCNC: 24 MMOL/L — SIGNIFICANT CHANGE UP (ref 22–31)
CREAT SERPL-MCNC: 0.66 MG/DL — SIGNIFICANT CHANGE UP (ref 0.5–1.3)
EGFR: 107 ML/MIN/1.73M2 — SIGNIFICANT CHANGE UP
EOSINOPHIL # BLD AUTO: 0.12 K/UL — SIGNIFICANT CHANGE UP (ref 0–0.5)
EOSINOPHIL NFR BLD AUTO: 2.1 % — SIGNIFICANT CHANGE UP (ref 0–6)
GLUCOSE SERPL-MCNC: 83 MG/DL — SIGNIFICANT CHANGE UP (ref 70–99)
HCT VFR BLD CALC: 38.5 % — SIGNIFICANT CHANGE UP (ref 34.5–45)
HGB BLD-MCNC: 11.7 G/DL — SIGNIFICANT CHANGE UP (ref 11.5–15.5)
IANC: 3.33 K/UL — SIGNIFICANT CHANGE UP (ref 1.8–7.4)
IMM GRANULOCYTES NFR BLD AUTO: 0.2 % — SIGNIFICANT CHANGE UP (ref 0–0.9)
LYMPHOCYTES # BLD AUTO: 1.66 K/UL — SIGNIFICANT CHANGE UP (ref 1–3.3)
LYMPHOCYTES # BLD AUTO: 28.7 % — SIGNIFICANT CHANGE UP (ref 13–44)
MCHC RBC-ENTMCNC: 22.4 PG — LOW (ref 27–34)
MCHC RBC-ENTMCNC: 30.4 GM/DL — LOW (ref 32–36)
MCV RBC AUTO: 73.6 FL — LOW (ref 80–100)
MONOCYTES # BLD AUTO: 0.66 K/UL — SIGNIFICANT CHANGE UP (ref 0–0.9)
MONOCYTES NFR BLD AUTO: 11.4 % — SIGNIFICANT CHANGE UP (ref 2–14)
NEUTROPHILS # BLD AUTO: 3.33 K/UL — SIGNIFICANT CHANGE UP (ref 1.8–7.4)
NEUTROPHILS NFR BLD AUTO: 57.4 % — SIGNIFICANT CHANGE UP (ref 43–77)
NRBC # BLD: 0 /100 WBCS — SIGNIFICANT CHANGE UP (ref 0–0)
NRBC # FLD: 0 K/UL — SIGNIFICANT CHANGE UP (ref 0–0)
PLATELET # BLD AUTO: 251 K/UL — SIGNIFICANT CHANGE UP (ref 150–400)
POTASSIUM SERPL-MCNC: 3.9 MMOL/L — SIGNIFICANT CHANGE UP (ref 3.5–5.3)
POTASSIUM SERPL-SCNC: 3.9 MMOL/L — SIGNIFICANT CHANGE UP (ref 3.5–5.3)
PROT SERPL-MCNC: 7.3 G/DL — SIGNIFICANT CHANGE UP (ref 6–8.3)
RBC # BLD: 5.23 M/UL — HIGH (ref 3.8–5.2)
RBC # FLD: 15.4 % — HIGH (ref 10.3–14.5)
SODIUM SERPL-SCNC: 133 MMOL/L — LOW (ref 135–145)
TROPONIN T, HIGH SENSITIVITY RESULT: <6 NG/L — SIGNIFICANT CHANGE UP
WBC # BLD: 5.79 K/UL — SIGNIFICANT CHANGE UP (ref 3.8–10.5)
WBC # FLD AUTO: 5.79 K/UL — SIGNIFICANT CHANGE UP (ref 3.8–10.5)

## 2022-09-29 PROCEDURE — 99285 EMERGENCY DEPT VISIT HI MDM: CPT

## 2022-09-29 PROCEDURE — 93010 ELECTROCARDIOGRAM REPORT: CPT

## 2022-09-29 PROCEDURE — 71046 X-RAY EXAM CHEST 2 VIEWS: CPT | Mod: 26

## 2022-09-29 RX ORDER — KETOROLAC TROMETHAMINE 30 MG/ML
30 SYRINGE (ML) INJECTION ONCE
Refills: 0 | Status: DISCONTINUED | OUTPATIENT
Start: 2022-09-29 | End: 2022-09-29

## 2022-09-29 RX ADMIN — Medication 30 MILLIGRAM(S): at 22:00

## 2022-09-29 NOTE — ED PROVIDER NOTE - ATTENDING APP SHARED VISIT CONTRIBUTION OF CARE
Gong: I have seen and examined the patient face to face, have reviewed and addended the HPI, PE and a/p as necessary.     50 yo F with HTN, asthma, anxiety a/w L sided chest pain x 1 day.  Reports pain radiates to back and to L arm.  No exertional chest pain.  Reports having similar symptoms every year, with notable neg stress in 2019 and 2021.  Reports pain is similar to previous episodes.  No fever/chills, No photophobia/eye pain/changes in vision, No ear pain/sore throat/dysphagia, no SOB/cough/wheeze/stridor, No abd pain, No N/V/D, no dysuria/frequency/discharge, No neck/back pain, no rash, no changes in neurological status/function.     GEN - NAD; well appearing; A+O x3; non-toxic appearing  CARD -s1s2, RRR, no M,G,R;   PULM - CTA b/l, symmetric breath sounds;   ABD -  +BS, ND, NT, soft, no guarding, no rebound, no masses;   BACK - no CVA tenderness, Normal  spine;   EXT - symmetric pulses, 2+ dp, capillary refill < 2 seconds, no cyanosis, no edema;   NEURO - no focal neuro deficits, no slurred speech    50 yo F with HTN, asthma, anxiety a/w L sided chest pain x 1 day. Possible ACS, though not fully consistent with no exertional component or sob.  Will check cbc, cmp, trop, and chest xray, toradol for pain, and possible outpt with cards.

## 2022-09-29 NOTE — ED PROVIDER NOTE - CARE PROVIDER_API CALL
Didier Thomas)  Internal Medicine; Nuclear Cardiology  [address] [place] [address]  Phone: [phone]  Fax: [phone]  Follow Up Time:

## 2022-09-29 NOTE — ED PROVIDER NOTE - OBJECTIVE STATEMENT
50 y/o F h/o htn, asthma, anxiety p/w L sided chest pain x 1 day. Pt reports sharp pain that radiates to back. Pt states pain is worse with palpation of area. Pt reports intermittent headache. Pt states she has similar symptoms in previous years. Has been seen in ED before in the past with negative work ups. Pt has also been seen by Cardiology, has had negative nuclear stress test in 2019 and 2021. Pt has had negative TTE as well. pt states this pain is similar to previous. Has not taken anything for pain. Pt denies fever, chills, sob, cough, abd pain, n/v/d, dizziness.  No etoh or tobacco use.

## 2022-09-29 NOTE — ED ADULT TRIAGE NOTE - CHIEF COMPLAINT QUOTE
Pt c/o mid-epigastric pain radiating to left arm and back side. Took Omeprazole earlier today with no relief. Denies any cardiac Hx. Phx: HTN

## 2022-09-29 NOTE — ED PROVIDER NOTE - PATIENT PORTAL LINK FT
You can access the FollowMyHealth Patient Portal offered by [facility] by registering at the following website: [email or web address]. By joining [employer]’s FollowMyHealth portal, you will also be able to view your health information using other applications (apps) compatible with our system.

## 2022-09-29 NOTE — ED PROVIDER NOTE - NS ED ATTENDING STATEMENT MOD
This was a shared visit with the ENRA. I reviewed and verified the documentation and independently performed the documented:

## 2022-09-29 NOTE — ED PROVIDER NOTE - CLINICAL SUMMARY MEDICAL DECISION MAKING FREE TEXT BOX
50 y/o F h/o htn, asthma, anxiety p/w L sided chest pain x 1 day. similar to pain in previous visits. negative cardiac w/u most recently earlier this year. Negative nuclear stress in Oct 2021  plan  - labs  - trop  - ekg  - cxr  - if  neg w/u f/u with cardiologist

## 2022-09-29 NOTE — ED ADULT NURSE NOTE - OBJECTIVE STATEMENT
Received pt to Intake Rm 5 with c/o epigastric pain and intermittent L arm numbness, tingling and dizziness x 2 days. pt reports taking Omeprazole with no relief. Pt reports hx of HTN and is compliant with medications. #20g IV placed to LAC, lab work collected as ordered. Pt denies chest pain, difficulty breathing, fever, cough or chills, abdominal pain, N/V/D.

## 2022-10-03 ENCOUNTER — NON-APPOINTMENT (OUTPATIENT)
Age: 52
End: 2022-10-03

## 2022-10-03 ENCOUNTER — APPOINTMENT (OUTPATIENT)
Dept: GASTROENTEROLOGY | Facility: CLINIC | Age: 52
End: 2022-10-03

## 2022-10-03 VITALS
HEIGHT: 62 IN | BODY MASS INDEX: 33.13 KG/M2 | OXYGEN SATURATION: 98 % | WEIGHT: 180 LBS | SYSTOLIC BLOOD PRESSURE: 127 MMHG | DIASTOLIC BLOOD PRESSURE: 75 MMHG | HEART RATE: 91 BPM

## 2022-10-03 DIAGNOSIS — R13.10 DYSPHAGIA, UNSPECIFIED: ICD-10-CM

## 2022-10-03 PROCEDURE — 99214 OFFICE O/P EST MOD 30 MIN: CPT

## 2022-10-03 NOTE — CONSULT LETTER
[Dear  ___] : Dear  [unfilled], [Consult Letter:] : I had the pleasure of evaluating your patient, [unfilled]. [Please see my note below.] : Please see my note below. [Consult Closing:] : Thank you very much for allowing me to participate in the care of this patient.  If you have any questions, please do not hesitate to contact me. [Sincerely,] : Sincerely, [FreeTextEntry3] : Kanu Ho MD\par \par Assistant Clinical Professor \par Division of Gastroenterology at [facility]\par [facility]\par [facility]\par [facility]\par \par [address], [facility] [address], [place] [address]\par Tel: [phone] | Fax: [phone]\par \par Twitter (Personal): @Kip \par \par \par

## 2022-10-03 NOTE — PHYSICAL EXAM
[Alert] : alert [Normal Voice/Communication] : normal voice/communication [Healthy Appearing] : healthy appearing [No Acute Distress] : no acute distress [Sclera] : the sclera and conjunctiva were normal [Hearing Threshold Finger Rub Not [place]] : hearing was normal [Normal Lips/Gums] : the lips and gums were normal [Oropharynx] : the oropharynx was normal [Normal Appearance] : the appearance of the neck was normal [No Neck Mass] : no neck mass was observed [No Respiratory Distress] : no respiratory distress [No Acc Muscle Use] : no accessory muscle use [Respiration, Rhythm And Depth] : normal respiratory rhythm and effort [Auscultation Breath Sounds / Voice Sounds] : lungs were clear to auscultation bilaterally [Heart Rate And Rhythm] : heart rate was normal and rhythm regular [Normal S1, S2] : normal S1 and S2 [Murmurs] : no murmurs [Bowel Sounds] : normal bowel sounds [Abdomen Tenderness] : non-tender [Abdomen Soft] : soft [] : no hepatosplenomegaly [Normal Kyphosis] : normal kyphosis [No Visual Abnormalities] : no visible abnormalities [Normal Lordosis] : normal lordosis [No Scoliosis] : no scoliosis [No Tenderness to Palpation] : no spine tenderness on palpation [No Masses] : no masses [Full ROM] : full ROM [No Pain with ROM] : no pain with motion in any direction [Intact] : all reflexes within normal limits bilaterally [Normal Station and Gait] : the gait and station were normal [Normal Motor Tone] : the muscle tone was normal [Involuntary Movements] : no involuntary movements were seen [Normal] : oriented to person, place, and time [Oriented To Time, Place, And Person] : oriented to person, place, and time [Cervical Lymph Nodes Enlarged Posterior Bilaterally] : nodes not enlarged [Supraclavicular Lymph Nodes Enlarged Bilaterally] : nodes not enlarged [Axillary Lymph Nodes Enlarged Bilaterally] : nodes not enlarged [Inguinal Lymph Nodes Enlarged Bilaterally] : nodes not enlarged [Abnormal Color] : normal color and pigmentation [Skin Lesions 1] : no skin lesions were observed [Skin Turgor Decreased] : normal skin turgor

## 2022-10-03 NOTE — HISTORY OF PRESENT ILLNESS
[FreeTextEntry1] : FILEMON ELIZONDO 51 year F with Atypical chest pain, HP positive on EGD, post therapy, and UBT negative, here for epigastric chest pain for 2 years.\par \par Patient underwent EGD and colonoscopy with Dr. Ordonez in 2021. She was HP positive and UBT was negative after therapy. Her Cardiac work up for atypical chest pain was negative. She was prescribed omeprazole 40 mg daily before lunch and sucralfate. Her abdominal USS in 2021 was normal. \par \par Her chest pain/epigastric pain radiating to back and left chest. Throbbing 3/10, no aggrevating or relieving factors.No associated nausea or vomiting.\par \par Patient states of a good appetite, no loss of weight, bowel movement once daily, formed and brown stools without blood or mucus. Denies abdominal pain, nausea, vomiting, melena, hematochezia, or hematemesis.\par \par FH: No 1st degree or 2nd degree relative with colon cancer, gastric cancer or pancreatic cancer.\par

## 2022-10-03 NOTE — ADDENDUM
[FreeTextEntry1] : The risks and benefits of my recommendations, as well as other treatment options were discussed with the patient and  today. Questions were answered.\par \par Please feel free to contact for any questions or concerns at my office  in the telephone numbers listed below.\par \par [address] [facility], [address], [place], [place], [address] Telephone: [phone] Fax: [phone]\par \par \par

## 2022-10-03 NOTE — ASSESSMENT
[FreeTextEntry1] : FILEMON ELIZONDO 51 year F with Atypical chest pain, HP positive on EGD, post therapy, and UBT negative, here for epigastric chest pain for 2 years.\par \par 1. Atypical chest pain with radiation to chest and back\par - I am unsure if the pain is due to acid reflux or not.\par -  I'll schedule a manometry with impedance with my colleagues Dr. Olvera or Dr. Acevedo off PPI.\par - I've prescribed hyoscyamine as needed.\par \par Follow up based on the above results

## 2022-10-05 ENCOUNTER — APPOINTMENT (OUTPATIENT)
Dept: PAIN MANAGEMENT | Facility: CLINIC | Age: 52
End: 2022-10-05

## 2022-10-05 VITALS
BODY MASS INDEX: 33.13 KG/M2 | DIASTOLIC BLOOD PRESSURE: 85 MMHG | HEART RATE: 85 BPM | HEIGHT: 62 IN | WEIGHT: 180 LBS | TEMPERATURE: 98.7 F | SYSTOLIC BLOOD PRESSURE: 136 MMHG

## 2022-10-05 PROCEDURE — 99214 OFFICE O/P EST MOD 30 MIN: CPT

## 2022-10-05 RX ORDER — MELOXICAM 15 MG/1
15 TABLET ORAL
Qty: 30 | Refills: 2 | Status: DISCONTINUED | COMMUNITY
Start: 2021-02-05 | End: 2022-10-05

## 2022-10-05 RX ORDER — VALSARTAN 80 MG/1
80 TABLET ORAL
Refills: 0 | Status: ACTIVE | COMMUNITY
Start: 2022-10-05

## 2022-10-05 RX ORDER — AMLODIPINE BESYLATE 5 MG/1
5 TABLET ORAL
Refills: 0 | Status: ACTIVE | COMMUNITY
Start: 2022-10-05

## 2022-10-05 RX ORDER — RIZATRIPTAN BENZOATE 10 MG/1
10 TABLET ORAL
Qty: 1 | Refills: 5 | Status: DISCONTINUED | COMMUNITY
Start: 2021-02-05 | End: 2022-10-05

## 2022-10-05 NOTE — PHYSICAL EXAM
[General Appearance - Alert] : alert [General Appearance - In No Acute Distress] : in no acute distress [General Appearance - Well-Appearing] : healthy appearing [Oriented To Time, Place, And Person] : oriented to person, place, and time [Affect] : the affect was normal [Mood] : the mood was normal [Cranial Nerves Facial (VII)] : face symmetrical [Cranial Nerves Accessory (XI - Cranial And Spinal)] : head turning and shoulder shrug symmetric [Cranial Nerves Hypoglossal (XII)] : there was no tongue deviation with protrusion [Motor Strength] : muscle strength was normal in all four extremities [General Appearance - Well Nourished] : well nourished [General Appearance - Well Developed] : well developed [Impaired Insight] : insight and judgment were intact [Memory Recent] : recent memory was not impaired [Memory Remote] : remote memory was not impaired [Person] : oriented to person [Place] : oriented to place [Time] : oriented to time [Short Term Intact] : short term memory intact [Remote Intact] : remote memory intact [Registration Intact] : recent registration memory intact [Concentration Intact] : normal concentrating ability [Visual Intact] : visual attention was ~T not ~L decreased [Naming Objects] : no difficulty naming common objects [Writing A Sentence] : no difficulty writing a sentence [Fluency] : fluency intact [Comprehension] : comprehension intact [Reading] : reading intact [Current Events] : adequate knowledge of current events [Past History] : adequate knowledge of personal past history [Cranial Nerves Oculomotor (III)] : extraocular motion intact [Cranial Nerves Vestibulocochlear (VIII)] : hearing was intact bilaterally [No Muscle Atrophy] : normal bulk in all four extremities [Motor Handedness Right-Handed] : the patient is right hand dominant [Motor Strength Upper Extremities Bilaterally] : strength was normal in both upper extremities [Sensation Tactile Decrease] : light touch was intact [Allodynia] : no ~T allodynia present [Past-pointing] : there was no past-pointing [Tremor] : no tremor present [Dysdiadochokinesia Bilaterally] : not present [Coordination - Dysmetria Impaired Finger-to-Nose Bilateral] : not present [Sclera] : the sclera and conjunctiva were normal [No NGUYEN] : no internuclear ophthalmoplegia [Strabismus] : no strabismus was seen [Outer Ear] : the ears and nose were normal in appearance [Neck Cervical Mass (___cm)] : no neck mass was observed [Exaggerated Use Of Accessory Muscles For Inspiration] : no accessory muscle use [Abnormal Walk] : normal gait [Nail Clubbing] : no clubbing  or cyanosis of the fingernails [Involuntary Movements] : no involuntary movements were seen [Skin Color & Pigmentation] : normal skin color and pigmentation [] : no rash

## 2022-10-05 NOTE — HISTORY OF PRESENT ILLNESS
[Headache] : headache [Dizziness] : dizziness [Nausea] : nausea [Phonophobia] : phonophobia [Neck Pain] : neck pain [Numbness] : numbness [Tingling] : tingling [Weakness] : weakness [Scalp Tenderness] : scalp tenderness [> 4 hours] : > 4 hours [FreeTextEntry1] : Ubrelvy not  helpful. Taking Tylenol for headaches. \par Did not start Nortriptyline. \par Pt has had more dizziness with migraine.  [Chronic Headache] : chronic headache [Aura] : no aura [Vomiting] : no Vomiting [Photophobia] : no photophobia [Scotoma] : no scotoma [Daily] : daily [stayed the same] : stayed the same [Worsened] : The patient reports ~his/her~ symptoms since the last visit have worsened [de-identified] : visual changes

## 2022-10-05 NOTE — ASSESSMENT
[FreeTextEntry1] : trial of nurtec.\par trial of magnesium and co q 10 ( start 1 week apart ) \par  RTO 6-8 weeks

## 2022-10-05 NOTE — REVIEW OF SYSTEMS
[Anxiety] : anxiety [Depression] : depression [Easy Bruising] : a tendency for easy bruising [Fever] : no fever [Chills] : no chills [Feeling Poorly] : feeling poorly [Feeling Tired] : feeling tired [Eye Pain] : eye pain [Eyesight Problems] : eyesight problems [Nasal Discharge] : no nasal discharge [Chest Pain] : no chest pain [Palpitations] : no palpitations [Shortness Of Breath] : no shortness of breath [Cough] : no cough [Arthralgias] : arthralgias [Neck Pain] : neck pain [Skin Lesions] : no skin lesions [Itching] : no itching [Convulsions] : no convulsions [Fainting] : no fainting [As Noted in HPI] : as noted in HPI [Suicidal] : not suicidal [Sleep Disturbances] : sleep disturbances [Muscle Weakness] : no muscle weakness [Easy Bleeding] : no tendency for easy bleeding

## 2022-10-06 ENCOUNTER — TRANSCRIPTION ENCOUNTER (OUTPATIENT)
Age: 52
End: 2022-10-06

## 2022-10-07 ENCOUNTER — TRANSCRIPTION ENCOUNTER (OUTPATIENT)
Age: 52
End: 2022-10-07

## 2022-10-10 ENCOUNTER — TRANSCRIPTION ENCOUNTER (OUTPATIENT)
Age: 52
End: 2022-10-10

## 2022-10-11 ENCOUNTER — TRANSCRIPTION ENCOUNTER (OUTPATIENT)
Age: 52
End: 2022-10-11

## 2022-10-12 ENCOUNTER — APPOINTMENT (OUTPATIENT)
Dept: GASTROENTEROLOGY | Facility: CLINIC | Age: 52
End: 2022-10-12

## 2022-10-12 ENCOUNTER — TRANSCRIPTION ENCOUNTER (OUTPATIENT)
Age: 52
End: 2022-10-12

## 2022-10-12 PROCEDURE — 99442: CPT

## 2022-10-13 ENCOUNTER — TRANSCRIPTION ENCOUNTER (OUTPATIENT)
Age: 52
End: 2022-10-13

## 2022-10-13 ENCOUNTER — NON-APPOINTMENT (OUTPATIENT)
Age: 52
End: 2022-10-13

## 2022-10-17 ENCOUNTER — APPOINTMENT (OUTPATIENT)
Dept: CARDIOLOGY | Facility: CLINIC | Age: 52
End: 2022-10-17

## 2022-10-17 ENCOUNTER — NON-APPOINTMENT (OUTPATIENT)
Age: 52
End: 2022-10-17

## 2022-10-17 VITALS
SYSTOLIC BLOOD PRESSURE: 132 MMHG | BODY MASS INDEX: 33.13 KG/M2 | HEART RATE: 72 BPM | WEIGHT: 180 LBS | OXYGEN SATURATION: 100 % | HEIGHT: 62 IN | DIASTOLIC BLOOD PRESSURE: 83 MMHG

## 2022-10-17 DIAGNOSIS — R07.9 CHEST PAIN, UNSPECIFIED: ICD-10-CM

## 2022-10-17 PROCEDURE — 93000 ELECTROCARDIOGRAM COMPLETE: CPT

## 2022-10-17 PROCEDURE — 99214 OFFICE O/P EST MOD 30 MIN: CPT | Mod: 25

## 2022-10-20 ENCOUNTER — APPOINTMENT (OUTPATIENT)
Dept: PAIN MANAGEMENT | Facility: CLINIC | Age: 52
End: 2022-10-20

## 2022-10-20 VITALS
BODY MASS INDEX: 33.13 KG/M2 | HEIGHT: 62 IN | HEART RATE: 77 BPM | DIASTOLIC BLOOD PRESSURE: 77 MMHG | WEIGHT: 180 LBS | SYSTOLIC BLOOD PRESSURE: 125 MMHG

## 2022-10-20 DIAGNOSIS — R51.9 HEADACHE, UNSPECIFIED: ICD-10-CM

## 2022-10-20 PROCEDURE — 99214 OFFICE O/P EST MOD 30 MIN: CPT

## 2022-10-20 RX ORDER — DULOXETINE HYDROCHLORIDE 30 MG/1
30 CAPSULE, DELAYED RELEASE PELLETS ORAL
Qty: 30 | Refills: 5 | Status: ACTIVE | COMMUNITY
Start: 2022-10-20 | End: 1900-01-01

## 2022-10-29 PROBLEM — R51.9 CHRONIC DAILY HEADACHE: Status: ACTIVE | Noted: 2021-02-08

## 2022-10-29 NOTE — ASSESSMENT
[FreeTextEntry1] : Would give trial of prn hydroxyzine\par trial of duloxetine\par referral to ENT\par agree with plan for cgrp antagonist.\par

## 2022-10-29 NOTE — PHYSICAL EXAM
[General Appearance - Alert] : alert [General Appearance - Well Nourished] : well nourished [General Appearance - Well Developed] : well developed [Impaired Insight] : insight and judgment were intact [Oriented To Time, Place, And Person] : oriented to person, place, and time [Memory Recent] : recent memory was not impaired [Memory Remote] : remote memory was not impaired [Person] : oriented to person [Place] : oriented to place [Time] : oriented to time [Short Term Intact] : short term memory intact [Remote Intact] : remote memory intact [Registration Intact] : recent registration memory intact [Concentration Intact] : normal concentrating ability [Visual Intact] : visual attention was ~T not ~L decreased [Naming Objects] : no difficulty naming common objects [Writing A Sentence] : no difficulty writing a sentence [Fluency] : fluency intact [Comprehension] : comprehension intact [Reading] : reading intact [Current Events] : adequate knowledge of current events [Past History] : adequate knowledge of personal past history [Cranial Nerves Oculomotor (III)] : extraocular motion intact [Cranial Nerves Facial (VII)] : face symmetrical [Cranial Nerves Vestibulocochlear (VIII)] : hearing was intact bilaterally [Cranial Nerves Accessory (XI - Cranial And Spinal)] : head turning and shoulder shrug symmetric [Cranial Nerves Hypoglossal (XII)] : there was no tongue deviation with protrusion [No Muscle Atrophy] : normal bulk in all four extremities [Motor Handedness Right-Handed] : the patient is right hand dominant [Motor Strength Upper Extremities Bilaterally] : strength was normal in both upper extremities [Sensation Tactile Decrease] : light touch was intact [Allodynia] : no ~T allodynia present [Past-pointing] : there was no past-pointing [Tremor] : no tremor present [Dysdiadochokinesia Bilaterally] : not present [Coordination - Dysmetria Impaired Finger-to-Nose Bilateral] : not present [No NGUYEN] : no internuclear ophthalmoplegia [Sclera] : the sclera and conjunctiva were normal [Strabismus] : no strabismus was seen [Outer Ear] : the ears and nose were normal in appearance [Neck Cervical Mass (___cm)] : no neck mass was observed [Exaggerated Use Of Accessory Muscles For Inspiration] : no accessory muscle use [Abnormal Walk] : normal gait [Nail Clubbing] : no clubbing  or cyanosis of the fingernails [Involuntary Movements] : no involuntary movements were seen [Skin Color & Pigmentation] : normal skin color and pigmentation [] : no rash

## 2022-10-29 NOTE — HISTORY OF PRESENT ILLNESS
[FreeTextEntry1] : Pt notes that she has had multiple issues going on.  Most recently a feeling of pressure in back that goes to flank and into shoulders - and at times down arms.\par Has had more issues with dizziness which can be associated with migraine but can also be associated with sudden movement.\par Has been getting head pain for about 8 days / month more continuously.\par Had been in bad episode when she had seen Clara at last time.\par When she feels dizzy in is more of a sense of imbalance then room spinning.\par Gets occasional ringing. [Headache] : headache [Dizziness] : dizziness [Nausea] : nausea [Neck Pain] : neck pain [> 4 hours] : > 4 hours [Stable] : The patient reports ~his/her~ symptoms since the last visit are stable [de-identified] : variable

## 2022-10-29 NOTE — REVIEW OF SYSTEMS
[Fever] : no fever [Feeling Poorly] : feeling poorly [Eyesight Problems] : no eyesight problems [Eye Pain] : eye pain [Nasal Discharge] : no nasal discharge [Chest Pain] : no chest pain [Cough] : no cough [Arthralgias] : arthralgias [Neck Pain] : neck pain [Skin Lesions] : no skin lesions [Skin Wound] : no skin wound [Itching] : no itching [Confused] : no confusion [Convulsions] : no convulsions [Dizziness] : dizziness [Fainting] : no fainting [Sleep Disturbances] : sleep disturbances [Muscle Weakness] : no muscle weakness [Swollen Glands] : no swollen glands

## 2022-11-04 ENCOUNTER — INPATIENT (INPATIENT)
Facility: HOSPITAL | Age: 52
LOS: 0 days | Discharge: HOME | DRG: 392 | End: 2022-11-05
Attending: INTERNAL MEDICINE | Admitting: INTERNAL MEDICINE
Payer: MEDICAID

## 2022-11-04 VITALS
WEIGHT: 179.9 LBS | OXYGEN SATURATION: 99 % | HEIGHT: 63 IN | HEART RATE: 72 BPM | DIASTOLIC BLOOD PRESSURE: 87 MMHG | RESPIRATION RATE: 18 BRPM | TEMPERATURE: 99 F | SYSTOLIC BLOOD PRESSURE: 129 MMHG

## 2022-11-04 PROCEDURE — 99285 EMERGENCY DEPT VISIT HI MDM: CPT

## 2022-11-05 ENCOUNTER — TRANSCRIPTION ENCOUNTER (OUTPATIENT)
Age: 52
End: 2022-11-05

## 2022-11-05 VITALS
TEMPERATURE: 98 F | HEART RATE: 81 BPM | DIASTOLIC BLOOD PRESSURE: 96 MMHG | RESPIRATION RATE: 17 BRPM | SYSTOLIC BLOOD PRESSURE: 137 MMHG | OXYGEN SATURATION: 100 %

## 2022-11-05 DIAGNOSIS — J45.909 UNSPECIFIED ASTHMA, UNCOMPLICATED: ICD-10-CM

## 2022-11-05 DIAGNOSIS — I10 ESSENTIAL (PRIMARY) HYPERTENSION: ICD-10-CM

## 2022-11-05 DIAGNOSIS — I20.0 UNSTABLE ANGINA: ICD-10-CM

## 2022-11-05 DIAGNOSIS — R07.89 OTHER CHEST PAIN: ICD-10-CM

## 2022-11-05 LAB
ALBUMIN SERPL ELPH-MCNC: 3.8 G/DL — SIGNIFICANT CHANGE UP (ref 3.3–5)
ALP SERPL-CCNC: 84 U/L — SIGNIFICANT CHANGE UP (ref 40–120)
ALT FLD-CCNC: 15 U/L — SIGNIFICANT CHANGE UP (ref 10–45)
ANION GAP SERPL CALC-SCNC: 10 MMOL/L — SIGNIFICANT CHANGE UP (ref 5–17)
ANISOCYTOSIS BLD QL: SLIGHT — SIGNIFICANT CHANGE UP
APTT BLD: 28.3 SEC — SIGNIFICANT CHANGE UP (ref 27.5–35.5)
AST SERPL-CCNC: 25 U/L — SIGNIFICANT CHANGE UP (ref 10–40)
BASOPHILS # BLD AUTO: 0 K/UL — SIGNIFICANT CHANGE UP (ref 0–0.2)
BASOPHILS NFR BLD AUTO: 0 % — SIGNIFICANT CHANGE UP (ref 0–2)
BILIRUB SERPL-MCNC: 0.2 MG/DL — SIGNIFICANT CHANGE UP (ref 0.2–1.2)
BUN SERPL-MCNC: 8 MG/DL — SIGNIFICANT CHANGE UP (ref 7–23)
CALCIUM SERPL-MCNC: 9 MG/DL — SIGNIFICANT CHANGE UP (ref 8.4–10.5)
CHLORIDE SERPL-SCNC: 105 MMOL/L — SIGNIFICANT CHANGE UP (ref 96–108)
CK MB CFR SERPL CALC: <1 NG/ML — SIGNIFICANT CHANGE UP (ref 0–3.8)
CO2 SERPL-SCNC: 21 MMOL/L — LOW (ref 22–31)
CREAT SERPL-MCNC: 0.57 MG/DL — SIGNIFICANT CHANGE UP (ref 0.5–1.3)
EGFR: 111 ML/MIN/1.73M2 — SIGNIFICANT CHANGE UP
EOSINOPHIL # BLD AUTO: 0.08 K/UL — SIGNIFICANT CHANGE UP (ref 0–0.5)
EOSINOPHIL NFR BLD AUTO: 0.9 % — SIGNIFICANT CHANGE UP (ref 0–6)
GLUCOSE SERPL-MCNC: 88 MG/DL — SIGNIFICANT CHANGE UP (ref 70–99)
HCG SERPL-ACNC: <2 MIU/ML — SIGNIFICANT CHANGE UP
HCT VFR BLD CALC: 38 % — SIGNIFICANT CHANGE UP (ref 34.5–45)
HGB BLD-MCNC: 11.3 G/DL — LOW (ref 11.5–15.5)
INR BLD: 1.01 RATIO — SIGNIFICANT CHANGE UP (ref 0.88–1.16)
LIDOCAIN IGE QN: 20 U/L — SIGNIFICANT CHANGE UP (ref 7–60)
LYMPHOCYTES # BLD AUTO: 2.09 K/UL — SIGNIFICANT CHANGE UP (ref 1–3.3)
LYMPHOCYTES # BLD AUTO: 24.5 % — SIGNIFICANT CHANGE UP (ref 13–44)
MANUAL SMEAR VERIFICATION: SIGNIFICANT CHANGE UP
MCHC RBC-ENTMCNC: 22.2 PG — LOW (ref 27–34)
MCHC RBC-ENTMCNC: 29.7 GM/DL — LOW (ref 32–36)
MCV RBC AUTO: 74.8 FL — LOW (ref 80–100)
MONOCYTES # BLD AUTO: 0.67 K/UL — SIGNIFICANT CHANGE UP (ref 0–0.9)
MONOCYTES NFR BLD AUTO: 7.9 % — SIGNIFICANT CHANGE UP (ref 2–14)
MYELOCYTES NFR BLD: 0.9 % — HIGH (ref 0–0)
NEUTROPHILS # BLD AUTO: 5.61 K/UL — SIGNIFICANT CHANGE UP (ref 1.8–7.4)
NEUTROPHILS NFR BLD AUTO: 65.8 % — SIGNIFICANT CHANGE UP (ref 43–77)
NT-PROBNP SERPL-SCNC: 24 PG/ML — SIGNIFICANT CHANGE UP (ref 0–300)
PLAT MORPH BLD: NORMAL — SIGNIFICANT CHANGE UP
PLATELET # BLD AUTO: 214 K/UL — SIGNIFICANT CHANGE UP (ref 150–400)
POIKILOCYTOSIS BLD QL AUTO: SLIGHT — SIGNIFICANT CHANGE UP
POTASSIUM SERPL-MCNC: 4.2 MMOL/L — SIGNIFICANT CHANGE UP (ref 3.5–5.3)
POTASSIUM SERPL-SCNC: 4.2 MMOL/L — SIGNIFICANT CHANGE UP (ref 3.5–5.3)
PROT SERPL-MCNC: 7.9 G/DL — SIGNIFICANT CHANGE UP (ref 6–8.3)
PROTHROM AB SERPL-ACNC: 11.6 SEC — SIGNIFICANT CHANGE UP (ref 10.5–13.4)
RBC # BLD: 5.08 M/UL — SIGNIFICANT CHANGE UP (ref 3.8–5.2)
RBC # FLD: 14.7 % — HIGH (ref 10.3–14.5)
RBC BLD AUTO: SIGNIFICANT CHANGE UP
SARS-COV-2 RNA SPEC QL NAA+PROBE: SIGNIFICANT CHANGE UP
SODIUM SERPL-SCNC: 136 MMOL/L — SIGNIFICANT CHANGE UP (ref 135–145)
TROPONIN T, HIGH SENSITIVITY RESULT: <6 NG/L — SIGNIFICANT CHANGE UP (ref 0–51)
TROPONIN T, HIGH SENSITIVITY RESULT: <6 NG/L — SIGNIFICANT CHANGE UP (ref 0–51)
WBC # BLD: 8.53 K/UL — SIGNIFICANT CHANGE UP (ref 3.8–10.5)
WBC # FLD AUTO: 8.53 K/UL — SIGNIFICANT CHANGE UP (ref 3.8–10.5)

## 2022-11-05 PROCEDURE — 83690 ASSAY OF LIPASE: CPT

## 2022-11-05 PROCEDURE — 99285 EMERGENCY DEPT VISIT HI MDM: CPT | Mod: 25

## 2022-11-05 PROCEDURE — 84484 ASSAY OF TROPONIN QUANT: CPT

## 2022-11-05 PROCEDURE — 85730 THROMBOPLASTIN TIME PARTIAL: CPT

## 2022-11-05 PROCEDURE — 83880 ASSAY OF NATRIURETIC PEPTIDE: CPT

## 2022-11-05 PROCEDURE — 82553 CREATINE MB FRACTION: CPT

## 2022-11-05 PROCEDURE — 99223 1ST HOSP IP/OBS HIGH 75: CPT

## 2022-11-05 PROCEDURE — G0378: CPT

## 2022-11-05 PROCEDURE — 80053 COMPREHEN METABOLIC PANEL: CPT

## 2022-11-05 PROCEDURE — 36415 COLL VENOUS BLD VENIPUNCTURE: CPT

## 2022-11-05 PROCEDURE — 84702 CHORIONIC GONADOTROPIN TEST: CPT

## 2022-11-05 PROCEDURE — 85025 COMPLETE CBC W/AUTO DIFF WBC: CPT

## 2022-11-05 PROCEDURE — 87635 SARS-COV-2 COVID-19 AMP PRB: CPT

## 2022-11-05 PROCEDURE — 71045 X-RAY EXAM CHEST 1 VIEW: CPT

## 2022-11-05 PROCEDURE — 85610 PROTHROMBIN TIME: CPT

## 2022-11-05 PROCEDURE — 71045 X-RAY EXAM CHEST 1 VIEW: CPT | Mod: 26

## 2022-11-05 RX ORDER — ACETAMINOPHEN 500 MG
650 TABLET ORAL EVERY 6 HOURS
Refills: 0 | Status: DISCONTINUED | OUTPATIENT
Start: 2022-11-05 | End: 2022-11-05

## 2022-11-05 RX ORDER — AMLODIPINE BESYLATE 2.5 MG/1
1 TABLET ORAL
Qty: 0 | Refills: 0 | DISCHARGE

## 2022-11-05 RX ORDER — ASPIRIN/CALCIUM CARB/MAGNESIUM 324 MG
162 TABLET ORAL ONCE
Refills: 0 | Status: COMPLETED | OUTPATIENT
Start: 2022-11-05 | End: 2022-11-05

## 2022-11-05 RX ORDER — ONDANSETRON 8 MG/1
4 TABLET, FILM COATED ORAL EVERY 8 HOURS
Refills: 0 | Status: DISCONTINUED | OUTPATIENT
Start: 2022-11-05 | End: 2022-11-05

## 2022-11-05 RX ORDER — NITROGLYCERIN 6.5 MG
0.4 CAPSULE, EXTENDED RELEASE ORAL ONCE
Refills: 0 | Status: COMPLETED | OUTPATIENT
Start: 2022-11-05 | End: 2022-11-05

## 2022-11-05 RX ORDER — ALPRAZOLAM 0.25 MG
0 TABLET ORAL
Qty: 0 | Refills: 0 | DISCHARGE

## 2022-11-05 RX ORDER — AMLODIPINE BESYLATE AND OLMESARTRAN MEDOXOMIL 10; 40 MG/1; MG/1
1 TABLET, FILM COATED ORAL
Qty: 0 | Refills: 0 | DISCHARGE

## 2022-11-05 RX ORDER — VALSARTAN 80 MG/1
1 TABLET ORAL
Qty: 0 | Refills: 0 | DISCHARGE

## 2022-11-05 RX ORDER — AMLODIPINE BESYLATE 2.5 MG/1
5 TABLET ORAL DAILY
Refills: 0 | Status: DISCONTINUED | OUTPATIENT
Start: 2022-11-05 | End: 2022-11-05

## 2022-11-05 RX ORDER — PANTOPRAZOLE SODIUM 20 MG/1
1 TABLET, DELAYED RELEASE ORAL
Qty: 0 | Refills: 0 | DISCHARGE

## 2022-11-05 RX ORDER — LANOLIN ALCOHOL/MO/W.PET/CERES
3 CREAM (GRAM) TOPICAL AT BEDTIME
Refills: 0 | Status: DISCONTINUED | OUTPATIENT
Start: 2022-11-05 | End: 2022-11-05

## 2022-11-05 RX ORDER — ALBUTEROL 90 UG/1
2 AEROSOL, METERED ORAL
Qty: 0 | Refills: 0 | DISCHARGE

## 2022-11-05 RX ADMIN — Medication 0.4 MILLIGRAM(S): at 05:59

## 2022-11-05 RX ADMIN — Medication 162 MILLIGRAM(S): at 03:47

## 2022-11-05 NOTE — DISCHARGE NOTE NURSING/CASE MANAGEMENT/SOCIAL WORK - PATIENT PORTAL LINK FT
You can access the FollowMyHealth Patient Portal offered by [facility] by registering at the following website: [email or web address]. By joining [employer]’s FollowMyHealth portal, you will also be able to view your health information using other applications (apps) compatible with our system.

## 2022-11-05 NOTE — DISCHARGE NOTE PROVIDER - CARE PROVIDER_API CALL
Jeanie Ramsey)  Internal Medicine  [address] [place] [address]  Phone: [phone]  Fax: [phone]  Follow Up Time: 1 week

## 2022-11-05 NOTE — DISCHARGE NOTE PROVIDER - NSDCCPCAREPLAN_GEN_ALL_CORE_FT
PRINCIPAL DISCHARGE DIAGNOSIS  Diagnosis: Asthma  Assessment and Plan of Treatment:       SECONDARY DISCHARGE DIAGNOSES  Diagnosis: Asthma  Assessment and Plan of Treatment: Continue with your inhalers and follow up with your provider as needed.

## 2022-11-05 NOTE — DISCHARGE NOTE PROVIDER - NSDCMRMEDTOKEN_GEN_ALL_CORE_FT
albuterol 90 mcg/inh inhalation aerosol: 2 puff(s) inhaled 4 times a day, As Needed  aluminum hydroxide-magnesium hydroxide 200 mg-200 mg/5 mL oral suspension: 30 milliliter(s) orally every 4 hours, As needed, Dyspepsia  amLODIPine 5 mg oral tablet: 1 tab(s) orally once a day  LORazepam 0.5 mg oral tablet: 1 tab(s) orally every 8 hours, As Needed  valsartan 80 mg oral tablet: 1 tab(s) orally once a day

## 2022-11-05 NOTE — DISCHARGE NOTE PROVIDER - NSDCFUSCHEDAPPT_GEN_ALL_CORE_FT
Lubna Olvera  [facility]  GASTR[address] BRY[id number]  Scheduled Appointment: 11/11/2022    Herb Jimenez  [facility]  OTOLARYNG [address]  Scheduled Appointment: 12/01/2022    Eli Rossi  [facility]  ENDOCRIN [address]  Scheduled Appointment: 12/05/2022    Clara Holcomb  [facility]  PAINMGT [address]  Scheduled Appointment: 12/06/2022    Baylee Yanez  [facility]  OTOLARYNG [address]  Scheduled Appointment: 12/15/2022    Eleno Negron  [facility]  OBGYNGEN [address]  Scheduled Appointment: 01/09/2023

## 2022-11-05 NOTE — ED PROVIDER NOTE - PHYSICAL EXAMINATION
Gen: NAD, non-toxic appearing  Head: normal appearing  HEENT: normal conjunctiva  Lung: no respiratory distress, speaking in full sentences, ctab     CV: regular rate and rhythm, no murmurs  Abd: soft, non distended, non tender   MSK: no visible deformities  Neuro: alert and grossly oriented, no gross motor deficits  Skin: No dakota rashes

## 2022-11-05 NOTE — H&P ADULT - HISTORY OF PRESENT ILLNESS
51F PMH HTN, asthma, anxiety presents to the hospital with chest pain. As per patient, chest pain began over her left chest while doing yard work. Patient reports she has had similar episodes in the past and went to the ED, however she was discharged at that time. Patient followed up with a cardiologist who performed a stress test which was negative. Patient denies nausea or vomiting. Denies diaphoresis. Denies shortness of breath or cough. Denies palpitations or swelling of the lower extremity.     ED course: VSS CBC with mild anemia, otherwise unremarkable. CMP unremarkable. Lipase not elevated. Troponin not elevated 3x. EKG with NSR without ischemic changes 2x. Admitted to medicine.

## 2022-11-05 NOTE — H&P ADULT - ASSESSMENT
51F PMH HTN, asthma, anxiety presents to the hospital with chest pain, no cardiac etiology identified and patient now for discharge as no inpatient testing is indicated.

## 2022-11-05 NOTE — DISCHARGE NOTE PROVIDER - NSFOLLOWUPCLINICS_GEN_ALL_ED_FT
[facility]  Cardiology  [address] [place] [id number]  Phone: [phone]  Fax:   Follow Up Time: 1 week

## 2022-11-05 NOTE — H&P ADULT - PROBLEM SELECTOR PLAN 1
Patient presented to the hospital with chest pain. Troponins negative 3x. EKG without ischemic changes. Patient previously has been seen in the ED for same problem and discharged without admission. No indication of cardiac etiology given above.   Patient has been seen by her cardiologist who performed a stress test.   - Following discharge, patient should be seen by her established outpatient cardiologist   - May also consider diagnosis of GERD or esophageal spasm (less likely) and patient should be seen by her established outpatient gastroenterologist

## 2022-11-05 NOTE — DISCHARGE NOTE PROVIDER - HOSPITAL COURSE
51F PMH HTN, asthma, anxiety presents to the hospital with chest pain, no cardiac etiology identified and patient now for discharge as no inpatient testing is indicated.      Problem/Plan - 1:  ·  Problem: Chest pain, non-cardiac.   ·  Plan: Patient presented to the hospital with chest pain. Troponins negative 3x. EKG without ischemic changes. Patient previously has been seen in the ED for same problem and discharged without admission. No indication of cardiac etiology given above.   Patient has been seen by her cardiologist who performed a stress test.   - Following discharge, patient should be seen by her established outpatient cardiologist   - May also consider diagnosis of GERD or esophageal spasm (less likely) and patient should be seen by her established outpatient gastroenterologist.     Problem/Plan - 2:  ·  Problem: Hypertension.   ·  Plan: - Continue with home amlodipine 5, olmesartan 20.     Problem/Plan - 3:  ·  Problem: Asthma.   ·  Plan: - Albuterol PRN.

## 2022-11-05 NOTE — ED PROVIDER NOTE - PROGRESS NOTE DETAILS
Andre Coffman MD (PGY-2): troponin undetectable, active chest pain ongoing, admission for high risk chest pain, likely requiring angio cath vs ct coronary and urgent cardiology evaluation.

## 2022-11-05 NOTE — DISCHARGE NOTE NURSING/CASE MANAGEMENT/SOCIAL WORK - NSDCVIVACCINE_GEN_ALL_CORE_FT
influenza, unspecified formulation [inactive]; 19-Oct-2013 04:15; Maria E Aguero (RN); [id number] (Exp. Date: 30-Jun-2014); IM; LArm; 0.5 ml; VIS (VIS Published: 26-Jul-2013, VIS Presented: 19-Oct-2013);

## 2022-11-05 NOTE — ED ADULT NURSE NOTE - NS ED NURSE LEVEL OF CONSCIOUSNESS MENTAL STATUS
Personalized Preventive Plan for Tiffanie Erazo - 10/5/2022  Medicare offers a range of preventive health benefits. Some of the tests and screenings are paid in full while other may be subject to a deductible, co-insurance, and/or copay. Some of these benefits include a comprehensive review of your medical history including lifestyle, illnesses that may run in your family, and various assessments and screenings as appropriate. After reviewing your medical record and screening and assessments performed today your provider may have ordered immunizations, labs, imaging, and/or referrals for you. A list of these orders (if applicable) as well as your Preventive Care list are included within your After Visit Summary for your review. Other Preventive Recommendations:    A preventive eye exam performed by an eye specialist is recommended every 1-2 years to screen for glaucoma; cataracts, macular degeneration, and other eye disorders. A preventive dental visit is recommended every 6 months. Try to get at least 150 minutes of exercise per week or 10,000 steps per day on a pedometer . Order or download the FREE \"Exercise & Physical Activity: Your Everyday Guide\" from [employer]. Call 1-869.554.9478 or search [employer] online. You need 6268-8660 mg of calcium and 2479-8873 IU of vitamin D per day. It is possible to meet your calcium requirement with diet alone, but a vitamin D supplement is usually necessary to meet this goal.  When exposed to the sun, use a sunscreen that protects against both UVA and UVB radiation with an SPF of 30 or greater. Reapply every 2 to 3 hours or after sweating, drying off with a towel, or swimming. Always wear a seat belt when traveling in a car. Always wear a helmet when riding a bicycle or motorcycle.
Awake/Alert

## 2022-11-05 NOTE — H&P ADULT - NSHPLABSRESULTS_GEN_ALL_CORE
I have personally reviewed the CXR and have found clear lungs  I have personally reviewed the EKG and found normal sinus rhythm with no ischemic changes

## 2022-11-05 NOTE — ED PROVIDER NOTE - OBJECTIVE STATEMENT
52 yo F, hx of htn and chronic dyspnea on exertion followed by cardiology, presenting w/ chest pain. Onset at [age] y/o while raking leaves. Dull, L sided, non-radiating, constant. No other active sxs. NKDA. No regular medications. No hx of cath. Last stress test was x1 year ago.

## 2022-11-05 NOTE — ED PROVIDER NOTE - ATTENDING CONTRIBUTION TO CARE
Patient presents with substernal chest pain that started with exertion, not improved with rest.  She does states she has been having exertional chest pain that improves with rest for "a while", but it never stick around like it did today.  She also endorses some diaphoresis along with it.  On exam, patient is well-appearing, in no acute distress, with unremarkable vital signs..  EKG is nonischemic.  Initial troponin negative, and the rest of the work-up in the ED negative.  However, story is concerning for ACS such as unstable angina, and she will get admitted for further work-up of high risk chest pain

## 2022-11-05 NOTE — DISCHARGE NOTE NURSING/CASE MANAGEMENT/SOCIAL WORK - NSDCPEFALRISK_GEN_ALL_CORE
For information on Fall & Injury Prevention, visit: [email or web address] OR  [email or web address] OR  https://www.cdc.gov/steadi/patient.html

## 2022-11-05 NOTE — ED ADULT NURSE NOTE - OBJECTIVE STATEMENT
51y Female PMHx HTN and Asthma presenting to ED via walk-in triage for chest pain. As per pt, today while doing yard work she began feeling chest pain, stab-like/burning sensation, and became diaphoretic. Pt states pain has been constant, nothing makes it better, pt describes pain ad midsternal and radiating down to L arm and L jaw, L arm/jaw also feels numb and tingly. Pt states last Monday she felt heart racing and "flutter." Pt states for the past couple of months she's had VALDOVINOS and follows up with a cardiology outpatient with all negative work-ups however pt states this is the first time she's had an episode like the one that bought her here. Upon assessment, A&Ox4, breathing spontaneously and unlabored on RA, able to ambulate independently, VSS. IV placed, labs drawn and sent. Stretcher in lowest position and locked, call bell within reach. 51y Female PMHx HTN and Asthma presenting to ED via walk-in triage for chest pain. As per pt, today while doing yard work she began feeling chest pain, stab-like/burning sensation, and became diaphoretic. Pt states pain has been constant, nothing makes it better, pt describes pain ad midsternal and radiating down to L arm and L jaw, L arm/jaw also feels numb and tingly. Pt states last Monday she felt heart racing and "flutter." Pt states for the past couple of months she's had VALDOVINOS and follows up with a cardiology outpatient with all negative work-ups however pt states this is the first time she's had an episode like the one that bought her here. Upon assessment, A&Ox4, breathing spontaneously and unlabored on RA, able to ambulate independently, VSS, CM NSR. IV placed, labs drawn and sent. Stretcher in lowest position and locked, call bell within reach.

## 2022-11-05 NOTE — ED PROVIDER NOTE - NS ED ROS FT
GENERAL: no fever  EYES: no eye pain  HEENT: no neck pain  CARDIAC: + chest pain  PULMONARY: + SOB on exertion  GI: no abdominal pain  : no dysuria  SKIN: no rashes  NEURO: no headache  MSK: no new joint pain

## 2022-11-05 NOTE — ED PROVIDER NOTE - CLINICAL SUMMARY MEDICAL DECISION MAKING FREE TEXT BOX
50 yo F, hx of htn and chronic dyspnea on exertion followed by cardiology, presenting w/ chest pain. r/out acs, no s/s vte or historical risk factors for same, no abdominal tenderness/concern for intra-abdominal origin of pain, screen for pulmonary process. likely admission for high risk chest pain. ecg, troponin, cbc, cmp, cxr, monitor, asa.

## 2022-11-05 NOTE — H&P ADULT - NEGATIVE HEMATOLOGY SYMPTOMS
Labor Analgesia  Performed by: Oh Armendariz MD  Authorized by: Oh Armendariz MD       General Information and Staff    Start Time:  7/19/2021 9:45 AM  End Time:  7/19/2021 9:51 AM  Anesthesiologist:  Oh Armendariz MD  Performed by: no gum bleeding/no nose bleeding/no skin lumps

## 2022-11-05 NOTE — DISCHARGE NOTE PROVIDER - ATTENDING ATTESTATION STATEMENT
Left arm; I have personally seen and examined the patient. I have collaborated with and supervised the

## 2022-11-07 ENCOUNTER — NON-APPOINTMENT (OUTPATIENT)
Age: 52
End: 2022-11-07

## 2022-11-07 PROBLEM — R07.9 CHEST PAIN: Status: ACTIVE | Noted: 2020-03-16

## 2022-11-07 NOTE — HISTORY OF PRESENT ILLNESS
[FreeTextEntry1] : She recently went to the ED for chest pain. She felt this was different. Chest through to the back then spreads around the back. Associated with tingling in the left. She does note the discomfort is more when she walks but this is in the back. Resolves on its own. She notes starting PPI which she thinks may be helping.

## 2022-11-07 NOTE — DISCUSSION/SUMMARY
[FreeTextEntry1] : Pain likely not cardiac\par If pain returns to that level will get CCTA - note patient has had 3 CT scans this year already.  [EKG obtained to assist in diagnosis and management of assessed problem(s)] : EKG obtained to assist in diagnosis and management of assessed problem(s)

## 2022-11-08 ENCOUNTER — TRANSCRIPTION ENCOUNTER (OUTPATIENT)
Age: 52
End: 2022-11-08

## 2022-11-08 PROBLEM — R13.10 DYSPHAGIA: Status: ACTIVE | Noted: 2022-11-08

## 2022-11-10 ENCOUNTER — TRANSCRIPTION ENCOUNTER (OUTPATIENT)
Age: 52
End: 2022-11-10

## 2022-11-11 ENCOUNTER — TRANSCRIPTION ENCOUNTER (OUTPATIENT)
Age: 52
End: 2022-11-11

## 2022-11-15 ENCOUNTER — APPOINTMENT (OUTPATIENT)
Dept: OTOLARYNGOLOGY | Facility: CLINIC | Age: 52
End: 2022-11-15

## 2022-11-15 VITALS
HEART RATE: 71 BPM | WEIGHT: 171 LBS | BODY MASS INDEX: 31.47 KG/M2 | SYSTOLIC BLOOD PRESSURE: 143 MMHG | HEIGHT: 62 IN | DIASTOLIC BLOOD PRESSURE: 89 MMHG

## 2022-11-15 VITALS — TEMPERATURE: 97.3 F

## 2022-11-15 PROCEDURE — 99214 OFFICE O/P EST MOD 30 MIN: CPT | Mod: 25

## 2022-11-15 PROCEDURE — 31575 DIAGNOSTIC LARYNGOSCOPY: CPT

## 2022-11-15 NOTE — CONSULT LETTER
[Dear  ___] : Dear  [unfilled], [Courtesy Letter:] : I had the pleasure of seeing your patient, [unfilled], in my office today. [Please see my note below.] : Please see my note below. [Consult Closing:] : Thank you very much for allowing me to participate in the care of this patient.  If you have any questions, please do not hesitate to contact me. [Sincerely,] : Sincerely, [FreeTextEntry2] : Dr Eli Rossi [FreeTextEntry3] : \par Herb Jimenez MD, FACS\par \par Otolaryngology-Head and Neck Surgery\par [employer] [facility]\par

## 2022-11-15 NOTE — HISTORY OF PRESENT ILLNESS
[de-identified] : This is a 51 yro patient with  multinodular goiter, Hashimoto's thyroiditis, who was referred by Dr. Rossi for thyroid nodule. US neck on 2/7/2022 which showed probable small upper submental midline thyroglossal duct cyst measuring 1 cm, largest nodule in right lobe  measuring 2 x 1.7 x 1.5 cm, and largest nodule in left lobe measuring 1.7 x 2.1 x 2.1 cm.  CT done 6/2022 showed enlarged, multinodular thyroid gland and Presumed prominent residual lingual tonsillar tissue. \par Today pt reports constant difficulty swallowing large pieces of food and big pills; feeling like they get stuck. Has been going a long time.Worse with congestion. Swallowing liquids okay. \par One episode a few weeks ago of coughing up blood after trying to bring up a piece of pastry that was stuck in throat. \par No odynophagia,  dysphonia or dyspnea. \par Not taking thyroid medication. Using Omeprazole in applesauce. \par Denies smoking or etoh use. \par No thyroid biopsy done. \par LABS 4/12/2022: TSH and Free T3 wnl.\par Pt states she needs to go for pH endoscopy study per GI but she is very scared given her swallow symptoms. \par \par Complete review of systems which was performed during a previous encounter was reviewed with the patient and there are no changes except as stated in the HPI section.\par \par

## 2022-11-16 ENCOUNTER — APPOINTMENT (OUTPATIENT)
Dept: CARDIOLOGY | Facility: CLINIC | Age: 52
End: 2022-11-16

## 2022-11-16 ENCOUNTER — NON-APPOINTMENT (OUTPATIENT)
Age: 52
End: 2022-11-16

## 2022-11-16 VITALS
SYSTOLIC BLOOD PRESSURE: 131 MMHG | OXYGEN SATURATION: 99 % | BODY MASS INDEX: 31.47 KG/M2 | DIASTOLIC BLOOD PRESSURE: 84 MMHG | WEIGHT: 171 LBS | HEART RATE: 89 BPM | HEIGHT: 62 IN

## 2022-11-16 PROCEDURE — 99214 OFFICE O/P EST MOD 30 MIN: CPT | Mod: 25

## 2022-11-16 PROCEDURE — 93000 ELECTROCARDIOGRAM COMPLETE: CPT

## 2022-11-23 ENCOUNTER — APPOINTMENT (OUTPATIENT)
Dept: SPEECH THERAPY | Facility: HOSPITAL | Age: 52
End: 2022-11-23

## 2022-11-23 ENCOUNTER — OUTPATIENT (OUTPATIENT)
Dept: OUTPATIENT SERVICES | Facility: HOSPITAL | Age: 52
LOS: 1 days | End: 2022-11-23

## 2022-11-23 ENCOUNTER — APPOINTMENT (OUTPATIENT)
Dept: RADIOLOGY | Facility: HOSPITAL | Age: 52
End: 2022-11-23

## 2022-11-23 ENCOUNTER — OUTPATIENT (OUTPATIENT)
Dept: OUTPATIENT SERVICES | Facility: HOSPITAL | Age: 52
LOS: 1 days | Discharge: HOME | End: 2022-11-23

## 2022-11-23 ENCOUNTER — NON-APPOINTMENT (OUTPATIENT)
Age: 52
End: 2022-11-23

## 2022-11-23 DIAGNOSIS — J35.1 HYPERTROPHY OF TONSILS: ICD-10-CM

## 2022-11-23 DIAGNOSIS — R13.10 DYSPHAGIA, UNSPECIFIED: ICD-10-CM

## 2022-11-23 PROCEDURE — 74230 X-RAY XM SWLNG FUNCJ C+: CPT | Mod: 26

## 2022-11-23 NOTE — ASSESSMENT
[FreeTextEntry1] : Patient presents with Functional Oral and Pharyngeal Stage swallowing mechanism. The Oral Stage is characterized by adequate oral containment, adequate chewing for solid, adequate bolus manipulation, adequate tongue motion with adequate anterior to posterior transfer of the bolus with adequate oral clearance. The Pharyngeal Stage is characterized by adequate initiation of the pharyngeal swallow, adequate laryngeal elevation, adequate tongue base retraction and adequate pharyngeal constriction. There is adequate pharyngeal clearance post swallow. There was Trace Laryngeal Penetration during the swallow for Thin Liquids with retrieval and airway protection maintained. There was No Aspiration observed before, during or after the swallow.\par \par RESULTS: \par No Aspiration observed before, during or after the swallow. \par Trace Laryngeal Penetration during the swallow for Thin Liquids with retrieval and airway protection maintained. \par \par Of Note: An Esophageal Screen was performed. Patient was given a Barium Tablet with a cup of water. The Tablet course through the pharynx/esophagus without hold up. This cannot be considered as a full complete evaluation of the esophagus.

## 2022-11-23 NOTE — PLAN
[FreeTextEntry2] : \par 1.) Continue with current diet regimen of Regular and Thin liquids\par 2.) Aspiration Precautions\par 3.) Reflux Precautions\par 4.) Maintain Good Oral Hygiene Care\par 5.) Follow up with Physician\par \par SLP provided Patient education regarding preliminary results. Patient verbalized understanding and will follow up with Physician. \par

## 2022-11-23 NOTE — HISTORY OF PRESENT ILLNESS
[FreeTextEntry1] : Patient arrived to Radiology for an OutPatient Modified Barium Swallow Study. 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)\par Common migraine with intractable migraine (346.11) (G43.019)\par Dizziness (780.4) (R42)\par Dysphagia (787.20) (R13.10)\par Encounter for immunization (V03.89) (Z23)\par Gastritis (535.50) (K29.70)\par Hashimoto's thyroiditis (245.2) (E06.3)\par Heart burn (787.1) (R12)\par Heartburn (787.1) (R12)\par Helicobacter pylori gastritis (535.10,041.86) (K29.70,B96.81)\par History of COVID-19 (V12.09) (Z86.16)\par Lingual tonsil hypertrophy (474.11) (J35.1)\par Multinodular thyroid (241.1) (E04.2)\par Panic disorder with agoraphobia and moderate panic attacks (300.21) (F40.01)\par Preop testing (V72.84) (Z01.818)\par Pseudotumor (G93.2)\par Screen for colon cancer (V76.51) (Z12.11)\par Snoring (786.09) (R06.83)\par Sternal pain (786.50) (R07.89)\par Tight chest (786.59) (R07.89)\par Well woman exam with routine gynecological exam (V72.31) (Z01.419)\par \par Past Medical History\par History of anemia (V12.3) (Z86.2)\par History of anxiety (V11.8) (Z86.59)\par History of hypertension (V12.59) (Z86.79)\par \par \par ENT Note 11/15/2022 \par - Recent CT neck showing tonsil hypertrophy. No associated symptoms.\par - US showing 1 cm cyst which could be a TGDC. CT images did not show the cyst.\par - Scope today showed lingual tonsil hypertrophy and tonsil tissue extending to the epiglottis. This looks benign and has been stable. Discussed findings with the patient today including DL with biopsy and observation. Since there are no discrete masses on exam and no suspicious LNs, I will continue observation of this for now.\par - Will request a new CT neck and MBS and refer to SLP.\par - Return in 6 months unless CT is abnormal. \par \par \par \par Patient offers c/o food and pills "getting stuck in the throat" and avoid big chunks or certain textures (e.g. Fried Chicken, Nuts, Pretzels) x 3 months.  Patient eats Regular and Thin Liquids. Patient reports no current/repeated pneumonia.  This Modified Barium Swallow Study is to objectively assess the Physiology of the Oral and Pharyngeal Stage swallowing mechanism for treatment plan for least restrictive diet. \par \par Referring MD: Dr. Herb Jimenez\par \par \par \par \par

## 2022-11-25 ENCOUNTER — APPOINTMENT (OUTPATIENT)
Dept: CT IMAGING | Facility: IMAGING CENTER | Age: 52
End: 2022-11-25

## 2022-11-29 ENCOUNTER — OUTPATIENT (OUTPATIENT)
Dept: OUTPATIENT SERVICES | Facility: HOSPITAL | Age: 52
LOS: 1 days | Discharge: HOME | End: 2022-11-29

## 2022-11-30 DIAGNOSIS — R13.10 DYSPHAGIA, UNSPECIFIED: ICD-10-CM

## 2022-11-30 PROCEDURE — 90839 PSYTX CRISIS INITIAL 60 MIN: CPT | Mod: 95

## 2022-12-02 ENCOUNTER — APPOINTMENT (OUTPATIENT)
Dept: SPEECH THERAPY | Facility: CLINIC | Age: 52
End: 2022-12-02

## 2022-12-02 PROCEDURE — 92526 ORAL FUNCTION THERAPY: CPT | Mod: GN

## 2022-12-05 ENCOUNTER — NON-APPOINTMENT (OUTPATIENT)
Age: 52
End: 2022-12-05

## 2022-12-05 LAB — SARS-COV-2 N GENE NPH QL NAA+PROBE: NOT DETECTED

## 2022-12-06 ENCOUNTER — APPOINTMENT (OUTPATIENT)
Dept: GASTROENTEROLOGY | Facility: HOSPITAL | Age: 52
End: 2022-12-06
Payer: MEDICAID

## 2022-12-06 ENCOUNTER — APPOINTMENT (OUTPATIENT)
Dept: PAIN MANAGEMENT | Facility: CLINIC | Age: 52
End: 2022-12-06

## 2022-12-06 ENCOUNTER — OUTPATIENT (OUTPATIENT)
Dept: OUTPATIENT SERVICES | Facility: HOSPITAL | Age: 52
LOS: 1 days | Discharge: HOME | End: 2022-12-06

## 2022-12-06 VITALS
HEART RATE: 68 BPM | WEIGHT: 179.9 LBS | SYSTOLIC BLOOD PRESSURE: 129 MMHG | HEIGHT: 61 IN | RESPIRATION RATE: 16 BRPM | DIASTOLIC BLOOD PRESSURE: 71 MMHG

## 2022-12-06 DIAGNOSIS — R07.9 CHEST PAIN, UNSPECIFIED: ICD-10-CM

## 2022-12-06 PROCEDURE — 91038 ESOPH IMPED FUNCT TEST > 1HR: CPT | Mod: 26

## 2022-12-06 PROCEDURE — 91013 ESOPHGL MOTIL W/STIM/PERFUS: CPT | Mod: 26

## 2022-12-06 PROCEDURE — 91010 ESOPHAGUS MOTILITY STUDY: CPT | Mod: 26

## 2022-12-06 PROCEDURE — 91037 ESOPH IMPED FUNCTION TEST: CPT | Mod: 26

## 2022-12-06 DEVICE — CATH VERSAFLEX Z PH: Type: IMPLANTABLE DEVICE | Status: FUNCTIONAL

## 2022-12-07 ENCOUNTER — TRANSCRIPTION ENCOUNTER (OUTPATIENT)
Age: 52
End: 2022-12-07

## 2022-12-07 NOTE — HISTORY OF PRESENT ILLNESS
[FreeTextEntry1] : Ms. Zuluaga was raking the leaves the other day she felt lightheaded, chest pain top left chest. She also nauseated. She also was sweating but she was being active. Today in the office, after the walk in she is felling some chest discomfort.

## 2022-12-07 NOTE — DISCUSSION/SUMMARY
[FreeTextEntry1] : Given ongoing symptoms will get nuclear stress testing. \par Continue present medicaitons for now.  [EKG obtained to assist in diagnosis and management of assessed problem(s)] : EKG obtained to assist in diagnosis and management of assessed problem(s)

## 2022-12-09 ENCOUNTER — TRANSCRIPTION ENCOUNTER (OUTPATIENT)
Age: 52
End: 2022-12-09

## 2022-12-12 ENCOUNTER — OUTPATIENT (OUTPATIENT)
Dept: OUTPATIENT SERVICES | Facility: HOSPITAL | Age: 52
LOS: 1 days | End: 2022-12-12
Payer: MEDICAID

## 2022-12-12 ENCOUNTER — APPOINTMENT (OUTPATIENT)
Dept: CV DIAGNOSTICS | Facility: HOSPITAL | Age: 52
End: 2022-12-12

## 2022-12-12 DIAGNOSIS — R07.9 CHEST PAIN, UNSPECIFIED: ICD-10-CM

## 2022-12-12 PROCEDURE — 93016 CV STRESS TEST SUPVJ ONLY: CPT

## 2022-12-12 PROCEDURE — 78452 HT MUSCLE IMAGE SPECT MULT: CPT | Mod: 26,MH

## 2022-12-12 PROCEDURE — 78452 HT MUSCLE IMAGE SPECT MULT: CPT | Mod: MH

## 2022-12-12 PROCEDURE — 93017 CV STRESS TEST TRACING ONLY: CPT

## 2022-12-12 PROCEDURE — A9500: CPT

## 2022-12-12 PROCEDURE — 93018 CV STRESS TEST I&R ONLY: CPT

## 2022-12-14 ENCOUNTER — APPOINTMENT (OUTPATIENT)
Dept: OTOLARYNGOLOGY | Facility: CLINIC | Age: 52
End: 2022-12-14

## 2022-12-15 ENCOUNTER — APPOINTMENT (OUTPATIENT)
Dept: OTOLARYNGOLOGY | Facility: CLINIC | Age: 52
End: 2022-12-15

## 2022-12-15 DIAGNOSIS — R42 DIZZINESS AND GIDDINESS: ICD-10-CM

## 2022-12-15 DIAGNOSIS — G43.019 MIGRAINE W/OUT AURA, INTRACTABLE, W/OUT STATUS MIGRAINOSUS: ICD-10-CM

## 2022-12-15 PROCEDURE — 99213 OFFICE O/P EST LOW 20 MIN: CPT

## 2022-12-15 PROCEDURE — 92567 TYMPANOMETRY: CPT

## 2022-12-15 PROCEDURE — 92557 COMPREHENSIVE HEARING TEST: CPT

## 2022-12-15 PROCEDURE — 92504 EAR MICROSCOPY EXAMINATION: CPT

## 2022-12-15 NOTE — PROCEDURE
[Same] : same as the Pre Op Dx. [] : Binocular Microscopy [FreeTextEntry1] : Dizziness [FreeTextEntry4] : none [FreeTextEntry6] : Operative microscope was used to examine the ear canal, ear drum and visible middle ear landmarks. Adequate exam would not have been possible without the use of a microscope. Findings are described.

## 2022-12-15 NOTE — PHYSICAL EXAM
[Binocular Microscopic Exam] : Binocular microscopic exam was performed [Rinne Test Air Conduction Persists > Bone Conduction Right] : air conduction greater than bone conduction on the right [Rinne Test Air Conduction Persists > Bone Conduction Left] : air conduction greater than bone conduction on the left [Hearing Rodriguez Test (Tuning Fork On Forehead)] : no lateralization of tone [Midline] : trachea located in midline position [Normal] : no rashes [Hearing Loss Right Only] : normal [Hearing Loss Left Only] : normal [Nystagmus] : ~T no ~M nystagmus was seen [Fukuda Step Test] : Fukuda Step Test was Negative [Romberg's Sign] : Romberg's sign was absent [Fistula Sign] : Fistula Sign: Negative [Past-Pointing] : Past-Pointing: Negative [Sofia-Halldanetteke] : Estelline-Hallpike: Negative

## 2022-12-15 NOTE — REASON FOR VISIT
[Initial Consultation] : an initial consultation for [FreeTextEntry2] : referred by Dr. Jimenez for dizziness

## 2022-12-15 NOTE — CONSULT LETTER
[Dear  ___] : Dear  [unfilled], [Consult Letter:] : I had the pleasure of evaluating your patient, [unfilled]. [Please see my note below.] : Please see my note below. [Consult Closing:] : Thank you very much for allowing me to participate in the care of this patient.  If you have any questions, please do not hesitate to contact me. [Sincerely,] : Sincerely, [FreeTextEntry2] : Dr. Jimenez [FreeTextEntry3] : Baylee Yanez MD, Otology Neurotology & Skull Base Surgery

## 2022-12-15 NOTE — DATA REVIEWED
[de-identified] : An audiogram was ordered and performed including tympanometry, pure tones and speech, for patient's complaint of vertigo\par I have independently reviewed the patient's audiogram from today and my findings include normal hearing apart from 10dB incr in threshold of L ear at 4k

## 2022-12-19 ENCOUNTER — APPOINTMENT (OUTPATIENT)
Dept: PSYCHIATRY | Facility: CLINIC | Age: 52
End: 2022-12-19

## 2022-12-19 DIAGNOSIS — F40.01 AGORAPHOBIA WITH PANIC DISORDER: ICD-10-CM

## 2022-12-19 PROCEDURE — 99214 OFFICE O/P EST MOD 30 MIN: CPT

## 2022-12-19 RX ORDER — METHOCARBAMOL 500 MG/1
500 TABLET, FILM COATED ORAL
Qty: 30 | Refills: 0 | Status: DISCONTINUED | COMMUNITY
Start: 2022-11-11

## 2022-12-19 RX ORDER — ALUMINUM HYDROXIDE, MAGNESIUM HYDROXIDE, DIMETHICONE 200; 200; 20 MG/5ML; MG/5ML; MG/5ML
200-200-20 LIQUID ORAL
Qty: 355 | Refills: 0 | Status: DISCONTINUED | COMMUNITY
Start: 2022-11-05

## 2022-12-19 RX ORDER — AZITHROMYCIN 250 MG/1
250 TABLET, FILM COATED ORAL
Qty: 6 | Refills: 0 | Status: DISCONTINUED | COMMUNITY
Start: 2022-11-02

## 2022-12-19 RX ORDER — DULOXETINE HYDROCHLORIDE 20 MG/1
20 CAPSULE, DELAYED RELEASE PELLETS ORAL
Qty: 30 | Refills: 0 | Status: ACTIVE | COMMUNITY
Start: 2022-12-19 | End: 1900-01-01

## 2022-12-19 RX ORDER — NORTRIPTYLINE HYDROCHLORIDE 10 MG/1
10 CAPSULE ORAL
Qty: 90 | Refills: 2 | Status: DISCONTINUED | COMMUNITY
Start: 2021-11-22 | End: 2022-12-19

## 2022-12-19 RX ORDER — SUCRALFATE 1 G/10ML
1 SUSPENSION ORAL
Qty: 420 | Refills: 0 | Status: DISCONTINUED | COMMUNITY
Start: 2022-11-09

## 2022-12-19 RX ORDER — LIDOCAINE 5% 700 MG/1
5 PATCH TOPICAL
Qty: 30 | Refills: 0 | Status: DISCONTINUED | COMMUNITY
Start: 2022-11-09

## 2022-12-19 RX ORDER — CYCLOBENZAPRINE HYDROCHLORIDE 5 MG/1
5 TABLET, FILM COATED ORAL
Qty: 30 | Refills: 0 | Status: DISCONTINUED | COMMUNITY
Start: 2022-11-09

## 2022-12-19 NOTE — DISCUSSION/SUMMARY
[FreeTextEntry1] : The patient is a 49 yo woman with hx of sx consistent with panic disorder with agoraphobia, had very brief trial on Paxil, stopped due to side effects, and now taking Ative 0.5 mg once a week as needed for severe anxiety/panic attacks, continues to have anxiety about panic attacks and avoids leaving home or going to certain places for fear of having panic attack, states she prefers to not take meds and wants to try non- pharmacological therapy. \par \par 1/31/2022: Patient states she was unable to start cognitive behavioral therapy for Agoraphobia.  She reports no change in her symptoms, continues to have frequent severe anxiety and fear of having panic attacks and not leaving the house, interfering with her ability functioning at her previous baseline.  Patient reports chronic daily migraine headaches, was prescribed nortriptyline but is not taking, does not want to take medications for panic disorder feels that she can manage it with using Ativan as needed as long as she is able to get back to being able to drive and function.  Considering increased stress, anxiety and insomnia could be contributing to migraine headaches advised patient to start taking nortriptyline which could also help with migraines and anxiety symptoms.\par \par 12/19/2022: Patient reported that she was doing cognitive behavior therapy after last visit which she felt was helping but that had to stop because of change in insurance since she has not been able to find anybody in any progress that she had made was lost and she is reporting that she is continuing to have increased anxiety and getting overwhelmed for simple things and then that triggers a panic attack and also continues to have fear of having panic attacks and not leaving the house, interfering with her ability functioning at her previous baseline. \par Patient did not start nortriptyline because of anxiety about side effects and she was recently prescribed duloxetine by her neurologist for migraine headaches which she also did not start taking.\par Discussed with the patient that taking duloxetine could benefit both anxiety symptoms and help with panic disorder and also migraine headaches.  Patient was worried about duloxetine increasing her blood pressure.  She is currently on 2 antihypertensive medications and reports that her blood pressure on the medication ranges between 120/80 to 140 /90 especially increases when she is more anxious.  Education provided to the patient about the side effects and that she could monitor her blood pressure after taking the duloxetine for 1 week and there is overall increase in blood pressure and not just when she is anxious then she can hold the duloxetine and we can discuss other medication options.\par

## 2022-12-19 NOTE — HISTORY OF PRESENT ILLNESS
[Home] : at home, [unfilled] , at the time of the visit. [FreeTextEntry1] : Patient was last seen for a follow-up visit on January 31, 2022.  Patient had not yet started CBT at that time and she reported that she was continuing to have frequent severe anxiety and fear of having panic attacks and was not leaving the house and the symptoms were interfering with her ability to function at her previous baseline.  She also had chronic migraine headaches for which she was prescribed nortriptyline and she had not yet started taking it.  She was referred to CBT tape practice and was advised to start nortriptyline which could help with both the migraine headaches and anxiety symptoms. \par Patient reported today that after last visit she started seeing Dr. Borden at the CBT practice but because of change in insurance she could not continue therapy after June 2022.  Patient reported that with therapy she started to slowly make changes and was able to manage her anxiety symptoms but since she could not continue the therapy and has not been able to find anyone other therapists that she reported that "I have gone backwards" and she states that even to  her daughter from her school she gets very nervous and anxious and she has gone to the emergency room with a panic attack because when she starts to feel anxious she has chest pain and thinks that she is having a panic attack.  Patient reported that she did not start taking nortriptyline because she was afraid of the side effects.  Patient states even with the migraine she has dizziness as one of the symptoms so she was afraid that if she were to take nortriptyline that also contributed to dizziness then she would feel more anxious and when she saw Dr. Gomez in October 2022 he had prescribed duloxetine instead.  Patient states she did not really start taking duloxetine because again she was worried about side effects.  Patient reported that she is able to fall asleep but her sleep is interrupted and she is having a hard time falling back to sleep.\par Discussed with the patient that taking duloxetine could benefit both anxiety symptoms and help with panic disorder and also migraine headaches.  Patient was worried about duloxetine increasing her blood pressure.  She is currently on 2 antihypertensive medications and reports that her blood pressure on the medication ranges between 120/80 to 140 /90 especially increases when she is more anxious.  Education provided to the patient about the side effects and that she could monitor her blood pressure after taking the duloxetine for 1 week and there is overall increase in blood pressure and not just when she is anxious then she can hold the duloxetine and we can discuss other medication options.\par

## 2022-12-19 NOTE — PLAN
[Medication education provided] : Medication education provided. [Rationale for medication choices, possible risks/precautions, benefits, alternative treatment choices, and consequences of non-treatment discussed] : Rationale for medication choices, possible risks/precautions, benefits, alternative treatment choices, and consequences of non-treatment discussed with patient/family/caregiver  [FreeTextEntry5] : Psychoeducation and supportive therapy provided,  and discussed rationale for recommended med changes \par Behavior activation\par Sleep hygiene education\par Medication:  Duloxetine 20 mg/day or a week, then increase to 30 mg/day\par  Ativan 0.5 mg once a day prn for severe anxiety, monitor use\par Discussed with patient adverse effects of longer term/frequent use of BZD, potential to develop tolerance to the dose effects and develop dependence, and potential for addiction. Advise patient not to drive or operate heavy machinery immediately after taking the medication. Also educated patient about safe use/and keeping meds safely, and to not share medications with family/friends. \par Educated patient of importance of remaining abstinent from drugs and alcohol. \par Emergency procedures were discussed: pt. educated to call 911 or go to nearest ER for worsening of symptoms/suicidal/homicidal ideation. \par Gave patient contact information for [facility]- CBT practice psychotherapy, and couple of outside psychologists and advised also to check out practice for availability for therapist\par RTC in 4 weeks or earlier as needed \par Patient given opportunity to ask questions and their questions were answered and they expressed understanding and agreement with above plan.\par \par I stop checked today: Reference #: [id number]\par Rx Written	Rx Dispensed	Drug	Quantity	Days Supply	Prescriber Name	Prescriber Vera #	Payment Method	Dispenser\par 01/31/2022	02/09/2022	lorazepam 0.5 mg tablet	30	30	Macie Doyle	[id number]	Insurance	[facility]\par Rx Written	Rx Dispensed	Drug	Quantity	Days Supply	Prescriber Name	Prescriber Vera #	Payment Method	Dispenser\par 09/14/2022	09/14/2022	lorazepam 0.5 mg tablet	5	5	Jeanie Ramsey MD	[id number]	Medicaid	[id number]\par \par I spent a total of 30 minutes for today's visit in evaluating and treating the patient as per above, including: preparing for patient's appointment (review of prior documents, [facility] ), performing a medically necessary exam/evaluation, communicating/counseling/educating the patient ordering medications, documenting clinical information in the EHR\par

## 2022-12-20 ENCOUNTER — APPOINTMENT (OUTPATIENT)
Dept: OTOLARYNGOLOGY | Facility: CLINIC | Age: 52
End: 2022-12-20

## 2022-12-21 ENCOUNTER — TRANSCRIPTION ENCOUNTER (OUTPATIENT)
Age: 52
End: 2022-12-21

## 2022-12-22 ENCOUNTER — NON-APPOINTMENT (OUTPATIENT)
Age: 52
End: 2022-12-22

## 2022-12-22 ENCOUNTER — APPOINTMENT (OUTPATIENT)
Dept: SPEECH THERAPY | Facility: HOSPITAL | Age: 52
End: 2022-12-22

## 2022-12-22 ENCOUNTER — APPOINTMENT (OUTPATIENT)
Dept: RADIOLOGY | Facility: HOSPITAL | Age: 52
End: 2022-12-22

## 2022-12-23 ENCOUNTER — TRANSCRIPTION ENCOUNTER (OUTPATIENT)
Age: 52
End: 2022-12-23

## 2022-12-23 RX ORDER — PANTOPRAZOLE SODIUM 40 MG/1
40 GRANULE, DELAYED RELEASE ORAL DAILY
Qty: 30 | Refills: 2 | Status: DISCONTINUED | COMMUNITY
Start: 2022-11-08 | End: 2022-12-23

## 2022-12-23 RX ORDER — OMEPRAZOLE 40 MG/1
40 CAPSULE, DELAYED RELEASE ORAL
Qty: 30 | Refills: 0 | Status: ACTIVE | COMMUNITY
Start: 2022-12-23 | End: 1900-01-01

## 2022-12-23 RX ORDER — OMEPRAZOLE 40 MG/1
40 CAPSULE, DELAYED RELEASE ORAL
Qty: 90 | Refills: 1 | Status: DISCONTINUED | COMMUNITY
Start: 2021-05-10 | End: 2022-12-23

## 2022-12-28 DIAGNOSIS — F41.9 ANXIETY DISORDER, UNSPECIFIED: ICD-10-CM

## 2023-01-05 ENCOUNTER — NON-APPOINTMENT (OUTPATIENT)
Age: 53
End: 2023-01-05

## 2023-01-06 ENCOUNTER — APPOINTMENT (OUTPATIENT)
Dept: OTOLARYNGOLOGY | Facility: CLINIC | Age: 53
End: 2023-01-06

## 2023-01-09 ENCOUNTER — OUTPATIENT (OUTPATIENT)
Dept: OUTPATIENT SERVICES | Facility: HOSPITAL | Age: 53
LOS: 1 days | End: 2023-01-09
Payer: MEDICAID

## 2023-01-09 ENCOUNTER — APPOINTMENT (OUTPATIENT)
Dept: CT IMAGING | Facility: CLINIC | Age: 53
End: 2023-01-09
Payer: MEDICAID

## 2023-01-09 DIAGNOSIS — R07.9 CHEST PAIN, UNSPECIFIED: ICD-10-CM

## 2023-01-09 DIAGNOSIS — Z00.8 ENCOUNTER FOR OTHER GENERAL EXAMINATION: ICD-10-CM

## 2023-01-09 PROCEDURE — 75574 CT ANGIO HRT W/3D IMAGE: CPT

## 2023-01-09 PROCEDURE — 75574 CT ANGIO HRT W/3D IMAGE: CPT | Mod: 26

## 2023-01-12 NOTE — H&P ADULT - HIV OFFER
How Severe Are Your Spot(S)?: mild
What Type Of Note Output Would You Prefer (Optional)?: Standard Output
What Is The Reason For Today's Visit?: Annual Full Body Skin Examination with No Concerns
What Is The Reason For Today's Visit? (Being Monitored For X): concerning skin lesions on an annual basis
Opt out

## 2023-01-18 ENCOUNTER — APPOINTMENT (OUTPATIENT)
Dept: OTOLARYNGOLOGY | Facility: CLINIC | Age: 53
End: 2023-01-18

## 2023-01-27 ENCOUNTER — NON-APPOINTMENT (OUTPATIENT)
Age: 53
End: 2023-01-27

## 2023-01-30 ENCOUNTER — APPOINTMENT (OUTPATIENT)
Dept: RHEUMATOLOGY | Facility: CLINIC | Age: 53
End: 2023-01-30
Payer: MEDICAID

## 2023-01-30 VITALS
WEIGHT: 168 LBS | SYSTOLIC BLOOD PRESSURE: 132 MMHG | BODY MASS INDEX: 30.91 KG/M2 | DIASTOLIC BLOOD PRESSURE: 85 MMHG | OXYGEN SATURATION: 99 % | HEART RATE: 73 BPM | TEMPERATURE: 97.9 F | HEIGHT: 62 IN

## 2023-01-30 DIAGNOSIS — M79.661 PAIN IN RIGHT LOWER LEG: ICD-10-CM

## 2023-01-30 DIAGNOSIS — R59.0 LOCALIZED ENLARGED LYMPH NODES: ICD-10-CM

## 2023-01-30 DIAGNOSIS — Z00.00 ENCOUNTER FOR GENERAL ADULT MEDICAL EXAMINATION W/OUT ABNORMAL FINDINGS: ICD-10-CM

## 2023-01-30 DIAGNOSIS — G89.29 CERVICALGIA: ICD-10-CM

## 2023-01-30 DIAGNOSIS — M54.2 CERVICALGIA: ICD-10-CM

## 2023-01-30 DIAGNOSIS — M79.662 PAIN IN RIGHT LOWER LEG: ICD-10-CM

## 2023-01-30 LAB
ALBUMIN SERPL ELPH-MCNC: 4.3 G/DL
ALP BLD-CCNC: 96 U/L
ALT SERPL-CCNC: 22 U/L
ANION GAP SERPL CALC-SCNC: 11 MMOL/L
APPEARANCE: CLEAR
AST SERPL-CCNC: 20 U/L
BACTERIA: NEGATIVE
BASOPHILS # BLD AUTO: 0.01 K/UL
BASOPHILS NFR BLD AUTO: 0.2 %
BILIRUB SERPL-MCNC: 0.2 MG/DL
BILIRUBIN URINE: NEGATIVE
BLOOD URINE: NEGATIVE
BUN SERPL-MCNC: 12 MG/DL
CALCIUM SERPL-MCNC: 9.8 MG/DL
CHLORIDE SERPL-SCNC: 106 MMOL/L
CO2 SERPL-SCNC: 24 MMOL/L
COLOR: NORMAL
CREAT SERPL-MCNC: 0.67 MG/DL
CREAT SPEC-SCNC: 47 MG/DL
CREAT/PROT UR: 0.1 RATIO
CRP SERPL-MCNC: <3 MG/L
EGFR: 106 ML/MIN/1.73M2
EOSINOPHIL # BLD AUTO: 0.15 K/UL
EOSINOPHIL NFR BLD AUTO: 2.7 %
ERYTHROCYTE [SEDIMENTATION RATE] IN BLOOD BY WESTERGREN METHOD: 43 MM/HR
GLUCOSE QUALITATIVE U: NEGATIVE
GLUCOSE SERPL-MCNC: 86 MG/DL
HCT VFR BLD CALC: 41.4 %
HGB BLD-MCNC: 11.9 G/DL
HYALINE CASTS: 0 /LPF
IMM GRANULOCYTES NFR BLD AUTO: 0.2 %
KETONES URINE: NEGATIVE
LDH SERPL-CCNC: 150 U/L
LEUKOCYTE ESTERASE URINE: NEGATIVE
LYMPHOCYTES # BLD AUTO: 1.38 K/UL
LYMPHOCYTES NFR BLD AUTO: 24.7 %
MAN DIFF?: NORMAL
MCHC RBC-ENTMCNC: 21.7 PG
MCHC RBC-ENTMCNC: 28.7 GM/DL
MCV RBC AUTO: 75.4 FL
MICROSCOPIC-UA: NORMAL
MONOCYTES # BLD AUTO: 0.47 K/UL
MONOCYTES NFR BLD AUTO: 8.4 %
NEUTROPHILS # BLD AUTO: 3.57 K/UL
NEUTROPHILS NFR BLD AUTO: 63.8 %
NITRITE URINE: NEGATIVE
PH URINE: 6
PLATELET # BLD AUTO: 247 K/UL
POTASSIUM SERPL-SCNC: 4.1 MMOL/L
PROT SERPL-MCNC: 8 G/DL
PROT UR-MCNC: 4 MG/DL
PROTEIN URINE: NEGATIVE
RBC # BLD: 5.49 M/UL
RBC # FLD: 15.8 %
RED BLOOD CELLS URINE: 3 /HPF
RHEUMATOID FACT SER QL: <10 IU/ML
SODIUM SERPL-SCNC: 140 MMOL/L
SPECIFIC GRAVITY URINE: 1.01
SQUAMOUS EPITHELIAL CELLS: 0 /HPF
UROBILINOGEN URINE: NORMAL
WBC # FLD AUTO: 5.59 K/UL
WHITE BLOOD CELLS URINE: 0 /HPF

## 2023-01-30 PROCEDURE — 99215 OFFICE O/P EST HI 40 MIN: CPT

## 2023-01-30 NOTE — HISTORY OF PRESENT ILLNESS
[FreeTextEntry1] : c/o neck pain with radiation to the upper back \par She also experiencing lower back pain with radiation to the left leg\par she was evaluated by pulmonary for shortness of breath and had CT done.  She was found GGO which has since resolved\par she went to see a cardiologist as well and had CT angio test done and was found to have right sided LAD and was told by her cardiologist to f/u with PCP.  \par no weight loss\par +night sweats\par no fever or chills\par Last mammo 2022 wnl\par +sicca symptoms\par c/o leg pain, intermittent with prominent veins\par

## 2023-01-30 NOTE — ASSESSMENT
[FreeTextEntry1] : 1. Spine pain \par evidence of disc disease on prior x-ray of the cervical spine--trial of PT \par check x-ray of the lumbar spine\par 2. GGO on most recent imaging and positive BRITTON\par check additional serology as outlined below \par GGO since then resolved\par f/u pulm\par 3. Axillary LAD--seen on cardiac imaging \par mammogram is overdue, Rx given to schedule an appointment \par f/u with gyn for yearly exam \par US of the axilla for further evaluation of LAD\par 4. Calf pain\par -obtain duplex \par 5. Hashimoto's\par US of the thyroid to evaluate for nodules \par \par video visit in 2 weeks to reviewed test results

## 2023-01-30 NOTE — CONSULT LETTER
[Dear  ___] : Dear  [unfilled], [Courtesy Letter:] : I had the pleasure of seeing your patient, [unfilled], in my office today. [Please see my note below.] : Please see my note below. [Consult Closing:] : Thank you very much for allowing me to participate in the care of this patient.  If you have any questions, please do not hesitate to contact me. [Sincerely,] : Sincerely, [FreeTextEntry3] : Dr. Windy Chin \par Rheumatology Attending\par [facility]\par

## 2023-01-31 LAB
CCP AB SER IA-ACNC: <8 UNITS
CENTROMERE IGG SER-ACNC: <0.2 CD:130001892
DSDNA AB SER-ACNC: <12 IU/ML
ENA RNP AB SER IA-ACNC: 0.6 AL
ENA SCL70 IGG SER IA-ACNC: <0.2 AL
ENA SM AB SER IA-ACNC: <0.2 AL
ENA SS-A AB SER IA-ACNC: 0.3 AL
ENA SS-B AB SER IA-ACNC: <0.2 AL
RF+CCP IGG SER-IMP: NEGATIVE

## 2023-02-01 ENCOUNTER — APPOINTMENT (OUTPATIENT)
Dept: NEUROSURGERY | Facility: CLINIC | Age: 53
End: 2023-02-01
Payer: MEDICAID

## 2023-02-01 ENCOUNTER — NON-APPOINTMENT (OUTPATIENT)
Age: 53
End: 2023-02-01

## 2023-02-01 VITALS
HEIGHT: 62 IN | DIASTOLIC BLOOD PRESSURE: 90 MMHG | HEART RATE: 82 BPM | SYSTOLIC BLOOD PRESSURE: 147 MMHG | BODY MASS INDEX: 30.91 KG/M2 | WEIGHT: 168 LBS | OXYGEN SATURATION: 100 %

## 2023-02-01 PROCEDURE — 99215 OFFICE O/P EST HI 40 MIN: CPT

## 2023-02-01 RX ORDER — HYDROXYZINE HYDROCHLORIDE 25 MG/1
25 TABLET ORAL
Qty: 60 | Refills: 2 | Status: DISCONTINUED | COMMUNITY
Start: 2022-10-20 | End: 2023-02-01

## 2023-02-01 RX ORDER — RIMEGEPANT SULFATE 75 MG/75MG
75 TABLET, ORALLY DISINTEGRATING ORAL DAILY
Qty: 8 | Refills: 2 | Status: DISCONTINUED | COMMUNITY
Start: 2022-10-05 | End: 2023-02-01

## 2023-02-01 RX ORDER — LORAZEPAM 1 MG/1
1 TABLET ORAL
Qty: 1 | Refills: 0 | Status: ACTIVE | COMMUNITY
Start: 2023-02-01 | End: 1900-01-01

## 2023-02-01 RX ORDER — UBROGEPANT 100 MG/1
100 TABLET ORAL
Qty: 10 | Refills: 0 | Status: DISCONTINUED | COMMUNITY
Start: 2022-01-11 | End: 2023-02-01

## 2023-02-01 RX ORDER — HYOSCYAMINE SULFATE 0.12 MG/1
0.12 TABLET, ORALLY DISINTEGRATING ORAL
Qty: 120 | Refills: 3 | Status: DISCONTINUED | COMMUNITY
Start: 2022-10-03 | End: 2023-02-01

## 2023-02-01 RX ORDER — SUCRALFATE 1 G/1
1 TABLET ORAL 3 TIMES DAILY
Qty: 90 | Refills: 2 | Status: DISCONTINUED | COMMUNITY
Start: 2022-02-23 | End: 2023-02-01

## 2023-02-01 RX ORDER — BISMUTH SUBCITRATE POTASSIUM, METRONIDAZOLE, AND TETRACYCLINE HYDROCHLORIDE 140; 125; 125 MG/1; MG/1; MG/1
140-125-125 CAPSULE ORAL
Qty: 120 | Refills: 0 | Status: DISCONTINUED | COMMUNITY
Start: 2021-08-02 | End: 2023-02-01

## 2023-02-01 NOTE — ASSESSMENT
[FreeTextEntry1] : IMPRESSION:\par 1. Pt is a 52 year old lady with known history of colloid cyst.  She endorses occasional headaches that are managed with Tylenol prn.  She has not had a follow up MRI brain and contacted Dr. Rodney however pt was referred to our service.\par 2. Pt is neurologically intact.\par \par \par \par PLAN:\par 1. MRI brain w/wo contrast.\par 2. F/U after imaging.\par

## 2023-02-01 NOTE — PHYSICAL EXAM
[General Appearance - Alert] : alert [General Appearance - Well Nourished] : well nourished [General Appearance - Well-Appearing] : healthy appearing [Oriented To Time, Place, And Person] : oriented to person, place, and time [Affect] : the affect was normal [Memory Recent] : recent memory was not impaired [Person] : oriented to person [Place] : oriented to place [Time] : oriented to time [Cranial Nerves Optic (II)] : visual acuity intact bilaterally,  pupils equal round and reactive to light [Cranial Nerves Oculomotor (III)] : extraocular motion intact [Cranial Nerves Trigeminal (V)] : facial sensation intact symmetrically [Cranial Nerves Facial (VII)] : face symmetrical [Cranial Nerves Vestibulocochlear (VIII)] : hearing was intact bilaterally [Cranial Nerves Glossopharyngeal (IX)] : tongue and palate midline [Cranial Nerves Accessory (XI - Cranial And Spinal)] : head turning and shoulder shrug symmetric [Cranial Nerves Hypoglossal (XII)] : there was no tongue deviation with protrusion [Motor Tone] : muscle tone was normal in all four extremities [Motor Handedness Right-Handed] : the patient is right hand dominant [Sclera] : the sclera and conjunctiva were normal [Outer Ear] : the ears and nose were normal in appearance [Hearing Threshold Finger Rub Not Deuel] : hearing was normal [Neck Appearance] : the appearance of the neck was normal [Heart Rate And Rhythm] : heart rate was normal and rhythm regular [Abnormal Walk] : normal gait [Skin Color & Pigmentation] : normal skin color and pigmentation [] : no rash [Sensation Tactile Decrease] : light touch was intact [Limited Balance] : balance was intact

## 2023-02-01 NOTE — HISTORY OF PRESENT ILLNESS
[FreeTextEntry1] : "Colloid cyst" [de-identified] : Te Zuluaga is a pleasant right handed 52 year old lady of [place] descent who presents for consultation regarding a small colloid cyst that was found incidentally on MRI brain done during an ER visit at [facility] in 2019. At that time patient had recent titration of BP medications she had an episode of elevated blood pressure, nausea, dizziness, generalized pain, head pressure. She described pain as being sharp, 5-6/10 and more so in both temporal areas. Pain was controlled with Tylenol prn.\par \par She is currently under the care of her PCP Dr. Teo Portillo,[address] [place] [address], [phone].  She also has a history of migraine headaches.  Insurance did not cover Nurtec nor Ubrelvy however pain is managed with Tylenol PRN.\par \par PMHX includes HTN, asthma, anxiety disorder, social anxiety, migraine headaches and claustrophobia.\par \par Social history,  with 1 child (5 years old). \par

## 2023-02-06 ENCOUNTER — APPOINTMENT (OUTPATIENT)
Dept: OBGYN | Facility: CLINIC | Age: 53
End: 2023-02-06
Payer: MEDICAID

## 2023-02-06 VITALS
WEIGHT: 168 LBS | BODY MASS INDEX: 30.91 KG/M2 | DIASTOLIC BLOOD PRESSURE: 78 MMHG | HEIGHT: 62 IN | SYSTOLIC BLOOD PRESSURE: 136 MMHG

## 2023-02-06 DIAGNOSIS — Z01.419 ENCOUNTER FOR GYNECOLOGICAL EXAMINATION (GENERAL) (ROUTINE) W/OUT ABNORMAL FINDINGS: ICD-10-CM

## 2023-02-06 PROCEDURE — 99396 PREV VISIT EST AGE 40-64: CPT

## 2023-02-06 NOTE — DISCUSSION/SUMMARY
[FreeTextEntry1] : - Pap/HPV WNL from 11/2021; rpt not indicated\par - Mammo/Sono requisition given by PCP\par - Colonoscopy screening up to date\par - Discussed treatments for vasomotor symptoms as well as hypoactive sexual desire; pt would rather try natural remedies prior to pharmacologic treatment.

## 2023-02-06 NOTE — HISTORY OF PRESENT ILLNESS
[Currently Active] : currently active [FreeTextEntry1] : 53yo P1 LMP Nov 2022 here for annual exam.  Reporting vasomotor symptoms such as hot flashes and night sweats.  Also hypoactive sexual desire.

## 2023-02-07 ENCOUNTER — TRANSCRIPTION ENCOUNTER (OUTPATIENT)
Age: 53
End: 2023-02-07

## 2023-02-07 DIAGNOSIS — M54.10 RADICULOPATHY, SITE UNSPECIFIED: ICD-10-CM

## 2023-02-07 LAB — RNA POLYMERASE III IGG: 18 UNITS

## 2023-02-07 NOTE — ED ADULT TRIAGE NOTE - STATUS:
PAST SURGICAL HISTORY:  History of biopsy Renal    History of esophagogastroduodenoscopy (EGD)     S/P correction of deviated nasal septum        Applied

## 2023-02-10 ENCOUNTER — TRANSCRIPTION ENCOUNTER (OUTPATIENT)
Age: 53
End: 2023-02-10

## 2023-02-10 LAB
CHOLEST SERPL-MCNC: 181 MG/DL
HDLC SERPL-MCNC: 57 MG/DL
LDLC SERPL CALC-MCNC: 110 MG/DL
NONHDLC SERPL-MCNC: 124 MG/DL
TRIGL SERPL-MCNC: 68 MG/DL

## 2023-02-14 ENCOUNTER — APPOINTMENT (OUTPATIENT)
Dept: CT IMAGING | Facility: IMAGING CENTER | Age: 53
End: 2023-02-14
Payer: MEDICAID

## 2023-02-14 ENCOUNTER — TRANSCRIPTION ENCOUNTER (OUTPATIENT)
Age: 53
End: 2023-02-14

## 2023-02-14 ENCOUNTER — APPOINTMENT (OUTPATIENT)
Dept: RADIOLOGY | Facility: IMAGING CENTER | Age: 53
End: 2023-02-14
Payer: MEDICAID

## 2023-02-14 ENCOUNTER — NON-APPOINTMENT (OUTPATIENT)
Age: 53
End: 2023-02-14

## 2023-02-14 ENCOUNTER — OUTPATIENT (OUTPATIENT)
Dept: OUTPATIENT SERVICES | Facility: HOSPITAL | Age: 53
LOS: 1 days | End: 2023-02-14
Payer: MEDICAID

## 2023-02-14 DIAGNOSIS — M54.2 CERVICALGIA: ICD-10-CM

## 2023-02-14 DIAGNOSIS — M54.10 RADICULOPATHY, SITE UNSPECIFIED: ICD-10-CM

## 2023-02-14 DIAGNOSIS — R13.10 DYSPHAGIA, UNSPECIFIED: ICD-10-CM

## 2023-02-14 DIAGNOSIS — J35.1 HYPERTROPHY OF TONSILS: ICD-10-CM

## 2023-02-14 PROCEDURE — 70491 CT SOFT TISSUE NECK W/DYE: CPT | Mod: 26

## 2023-02-14 PROCEDURE — 72100 X-RAY EXAM L-S SPINE 2/3 VWS: CPT

## 2023-02-14 PROCEDURE — 72050 X-RAY EXAM NECK SPINE 4/5VWS: CPT | Mod: 26

## 2023-02-14 PROCEDURE — 72050 X-RAY EXAM NECK SPINE 4/5VWS: CPT

## 2023-02-14 PROCEDURE — 72100 X-RAY EXAM L-S SPINE 2/3 VWS: CPT | Mod: 26

## 2023-02-14 PROCEDURE — 70491 CT SOFT TISSUE NECK W/DYE: CPT

## 2023-02-16 ENCOUNTER — APPOINTMENT (OUTPATIENT)
Dept: PULMONOLOGY | Facility: CLINIC | Age: 53
End: 2023-02-16
Payer: MEDICAID

## 2023-02-16 VITALS
BODY MASS INDEX: 31.47 KG/M2 | DIASTOLIC BLOOD PRESSURE: 83 MMHG | HEIGHT: 62 IN | SYSTOLIC BLOOD PRESSURE: 138 MMHG | HEART RATE: 76 BPM | TEMPERATURE: 97.5 F | WEIGHT: 171 LBS | OXYGEN SATURATION: 99 % | RESPIRATION RATE: 17 BRPM

## 2023-02-16 DIAGNOSIS — R06.83 SNORING: ICD-10-CM

## 2023-02-16 PROCEDURE — 99215 OFFICE O/P EST HI 40 MIN: CPT

## 2023-02-16 NOTE — HISTORY OF PRESENT ILLNESS
[Never] : never [TextBox_4] : This letter  is regarding your patient  who  attended pulmonary out patient office today.  I have reviewed  patient's  past history, social history, family history and medication list. I also  reviewed nurse practitioners/ and fellows  notes and assessment and agree with it.  \par The patient was referred by -------\par \par \par Non-smoker----allergic to herDYE------\par ------No history of , fever, chills , rigors, chest pain, or hemoptysis. Questionable history of Raynaud's phenomenon. No h/o significant weight loss in last few months. No history of liver dysfunction , collagen vascular disorder or chronic thromboembolic disease. I would classify the patient's dyspnea as WHO  FUNCTIONAL CLASS II--------\par \par ----Echo  date------ 2/2022: Normal pulmonary pressures, preserved LV function\par ----Pft date--------- minimal obstruction\par ----Ct scan date------- 9/15/22 Upper lobe nodule resolved\par \par 9/2022:----Asthma much better controlled-----on Advair------- albuterol as needed-------- reports feeling well. She states that sometimes she feels chest tightness with exertion and with deep inspiration. She states this feeling is alleviated with albuterol. She denies nocturnal symptoms. Ct scan reviewed, nodule resolved. Has been compliant with advair.\par \par \par 2/2023- Asthma much better controlled--on [id number]/50  ---states she does not use Albuterol as often---reports he is well----she states she has chest tightness sometimes following up with cardiology----needs sleep study and repeat PFTS ---f/u allergy and rheumatology[ positive BRITTON] \par

## 2023-02-16 NOTE — PHYSICAL EXAM
[No Acute Distress] : no acute distress [Normal Oropharynx] : normal oropharynx [II] : Mallampati Class: II [Normal Rate/Rhythm] : normal rate/rhythm [Normal S1, S2] : normal s1, s2 [No Murmurs] : no murmurs [No Abnormalities] : no abnormalities [Benign] : benign [Normal Gait] : normal gait [No Clubbing] : no clubbing [Normal Color/ Pigmentation] : normal color/ pigmentation [No Focal Deficits] : no focal deficits [Oriented x3] : oriented x3 [TextBox_44] : Thyromegaly [TextBox_68] : Decreased entry at bases

## 2023-02-16 NOTE — HISTORY OF PRESENT ILLNESS
[Never] : never [TextBox_4] : This letter  is regarding your patient  who  attended pulmonary out patient office today.  I have reviewed  patient's  past history, social history, family history and medication list. I also  reviewed nurse practitioners/ and fellows  notes and assessment and agree with it.  \par The patient was referred by -------\par \par \par Non-smoker----allergic to herDYE------\par ------No history of , fever, chills , rigors, chest pain, or hemoptysis. Questionable history of Raynaud's phenomenon. No h/o significant weight loss in last few months. No history of liver dysfunction , collagen vascular disorder or chronic thromboembolic disease. I would classify the patient's dyspnea as WHO  FUNCTIONAL CLASS II--------\par \par ----Echo  date------ 2/2022: Normal pulmonary pressures, preserved LV function\par ----Pft date--------- minimal obstruction\par ----Ct scan date------- 9/15/22 Upper lobe nodule resolved\par \par 9/2022:----Asthma much better controlled-----on Advair------- albuterol as needed-------- reports feeling well. She states that sometimes she feels chest tightness with exertion and with deep inspiration. She states this feeling is alleviated with albuterol. She denies nocturnal symptoms. Ct scan reviewed, nodule resolved. Has been compliant with advair.\par \par \par 2/2023- Asthma much better controlled--on Axolom284/50  ---states she does not use Albuterol as often---reports he is well----she states she has chest tightness sometimes following up with cardiology----needs sleep study and repeat PFTS ---f/u allergy and rheumatology[ positive BRITTON] \par

## 2023-02-16 NOTE — DISCUSSION/SUMMARY
[FreeTextEntry1] : ---Assessment plan----------The patient has been referred here for further opinion regarding pulmonary problem,\par \par 51-year-old female history of bronchial asthma\par \par 1 continue Advair 250/50   twice daily--- albuterol as needed\par 2 Labs reviewed, no eosinophilia or elevated IgE--PT SYAS SHE HAS GENERAL ALLERGIES REFD TO ALLERGY AND IMMUNOLOGY  --- \par 3  CT CHEST reviewed, resolved nodule---HAS RIGHT AXILLARY  LN--NEEDS MAMMOGRAPHY - - \par 4 Advised to use albuterol 30 minutes prior to exercise \par 5 Home sleep study\par 6 Influenza vaccine   UTD \par \par F/U in JULY 2023, or earlier if symptoms not improved\par \par Thanks for allowing  me to participate  in the care of this patient.  Patient at this time  will follow  the above mentioned recommendations and return back for follow up visit. If you have any questions  I can be reached  at # [phone] (office).\par \par David Reyes MD, FCCP \par Director, Pulmonary Hypertension Program \par [facility] \par Division of Pulmonary, Critical Care and Sleep Medicine \par  Professor of Medicine \par [employer]\par \par KATALINA HopsonC\par

## 2023-02-16 NOTE — DISCUSSION/SUMMARY
[FreeTextEntry1] : ---Assessment plan----------The patient has been referred here for further opinion regarding pulmonary problem,\par \par 51-year-old female history of bronchial asthma\par \par 1 continue Advair 250/50   twice daily--- albuterol as needed\par 2 Labs reviewed, no eosinophilia or elevated IgE--PT SYAS SHE HAS GENERAL ALLERGIES REFD TO ALLERGY AND IMMUNOLOGY  --- \par 3  CT CHEST reviewed, resolved nodule---HAS RIGHT AXILLARY  LN--NEEDS MAMMOGRAPHY - - \par 4 Advised to use albuterol 30 minutes prior to exercise \par 5 Home sleep study\par 6 Influenza vaccine   UTD \par \par F/U in JULY 2023, or earlier if symptoms not improved\par \par Thanks for allowing  me to participate  in the care of this patient.  Patient at this time  will follow  the above mentioned recommendations and return back for follow up visit. If you have any questions  I can be reached  at # [phone] (office).\par \par David Reyes MD, FCCP \par Director, Pulmonary Hypertension Program \par [facility] \par Division of Pulmonary, Critical Care and Sleep Medicine \par  Professor of Medicine \par [facility] [employer]\par \par KATALINA HopsonC\par

## 2023-02-16 NOTE — HISTORY OF PRESENT ILLNESS
[Never] : never [TextBox_4] : This letter  is regarding your patient  who  attended pulmonary out patient office today.  I have reviewed  patient's  past history, social history, family history and medication list. I also  reviewed nurse practitioners/ and fellows  notes and assessment and agree with it.  \par The patient was referred by -------\par \par \par Non-smoker----allergic to herDYE------\par ------No history of , fever, chills , rigors, chest pain, or hemoptysis. Questionable history of Raynaud's phenomenon. No h/o significant weight loss in last few months. No history of liver dysfunction , collagen vascular disorder or chronic thromboembolic disease. I would classify the patient's dyspnea as WHO  FUNCTIONAL CLASS II--------\par \par ----Echo  date------ 2/2022: Normal pulmonary pressures, preserved LV function\par ----Pft date--------- minimal obstruction\par ----Ct scan date------- 9/15/22 Upper lobe nodule resolved\par \par 9/2022:----Asthma much better controlled-----on Advair------- albuterol as needed-------- reports feeling well. She states that sometimes she feels chest tightness with exertion and with deep inspiration. She states this feeling is alleviated with albuterol. She denies nocturnal symptoms. Ct scan reviewed, nodule resolved. Has been compliant with advair.\par \par \par 2/2023- Asthma much better controlled--on Ltylyu813/50  ---states she does not use Albuterol as often---reports he is well----she states she has chest tightness sometimes following up with cardiology----needs sleep study and repeat PFTS ---f/u allergy and rheumatology[ positive BRITTON] \par

## 2023-02-16 NOTE — DISCUSSION/SUMMARY
[FreeTextEntry1] : ---Assessment plan----------The patient has been referred here for further opinion regarding pulmonary problem,\par \par 51-year-old female history of bronchial asthma\par \par 1 continue Advair 250/50   twice daily--- albuterol as needed\par 2 Labs reviewed, no eosinophilia or elevated IgE--PT SYAS SHE HAS GENERAL ALLERGIES REFD TO ALLERGY AND IMMUNOLOGY  --- \par 3  CT CHEST reviewed, resolved nodule---HAS RIGHT AXILLARY  LN--NEEDS MAMMOGRAPHY - - \par 4 Advised to use albuterol 30 minutes prior to exercise \par 5 Home sleep study\par 6 Influenza vaccine   UTD \par \par F/U in JULY 2023, or earlier if symptoms not improved\par \par Thanks for allowing  me to participate  in the care of this patient.  Patient at this time  will follow  the above mentioned recommendations and return back for follow up visit. If you have any questions  I can be reached  at # [phone] (office).\par \par David Reyes MD, FCCP \par Director, Pulmonary Hypertension Program \par [facility] \par Division of Pulmonary, Critical Care and Sleep Medicine \par  Professor of Medicine \par [employer]\par \par KATALINA HopsonC\par

## 2023-02-17 ENCOUNTER — NON-APPOINTMENT (OUTPATIENT)
Age: 53
End: 2023-02-17

## 2023-02-21 ENCOUNTER — NON-APPOINTMENT (OUTPATIENT)
Age: 53
End: 2023-02-21

## 2023-02-27 ENCOUNTER — APPOINTMENT (OUTPATIENT)
Dept: OPHTHALMOLOGY | Facility: CLINIC | Age: 53
End: 2023-02-27
Payer: MEDICAID

## 2023-02-27 ENCOUNTER — NON-APPOINTMENT (OUTPATIENT)
Age: 53
End: 2023-02-27

## 2023-02-27 ENCOUNTER — APPOINTMENT (OUTPATIENT)
Dept: CARDIOLOGY | Facility: CLINIC | Age: 53
End: 2023-02-27

## 2023-02-27 PROCEDURE — 92014 COMPRE OPH EXAM EST PT 1/>: CPT

## 2023-03-01 ENCOUNTER — APPOINTMENT (OUTPATIENT)
Dept: OTOLARYNGOLOGY | Facility: CLINIC | Age: 53
End: 2023-03-01

## 2023-03-01 DIAGNOSIS — R13.12 DYSPHAGIA, OROPHARYNGEAL PHASE: ICD-10-CM

## 2023-03-08 ENCOUNTER — APPOINTMENT (OUTPATIENT)
Dept: ENDOCRINOLOGY | Facility: CLINIC | Age: 53
End: 2023-03-08
Payer: MEDICAID

## 2023-03-08 VITALS
DIASTOLIC BLOOD PRESSURE: 82 MMHG | SYSTOLIC BLOOD PRESSURE: 132 MMHG | TEMPERATURE: 98.6 F | WEIGHT: 167 LBS | OXYGEN SATURATION: 98 % | HEIGHT: 62 IN | BODY MASS INDEX: 30.73 KG/M2 | HEART RATE: 91 BPM

## 2023-03-08 DIAGNOSIS — E06.3 AUTOIMMUNE THYROIDITIS: ICD-10-CM

## 2023-03-08 PROCEDURE — 99214 OFFICE O/P EST MOD 30 MIN: CPT | Mod: 25

## 2023-03-08 NOTE — REVIEW OF SYSTEMS
[Fatigue] : fatigue [Depression] : depression [Anxiety] : anxiety [All other systems negative] : All other systems negative [Recent Weight Loss (___ Lbs)] : recent weight loss: [unfilled] lbs [FreeTextEntry7] : difficulty swallowing

## 2023-03-08 NOTE — HISTORY OF PRESENT ILLNESS
[FreeTextEntry1] : CC: Thyroid nodules\par This is a 52-year-old female with multinodular goiter, Hashimoto's thyroiditis, migraines, anxiety, depression, hypertension, asthma, vitamin D insufficiency, brain cyst, panic disorder/agoraphobia, here for follow-up.\par Per patient PCP noticed thyroid enlargement in February 2022.  She had neck ultrasound on February 7, 2022 which showed right lobe 6.4 x 2.1 x 2.5 cm, left lobe 5.7 x 2.4 x 2.3 cm, isthmus measuring 6 mm.\par Right lobe mostly solid isoechoic to hyperechoic nodules, largest measuring 2 x 1.7 x 1.5 cm\par Left mostly solid isoechoic nodules largest measuring 1.7 x 2.1 x 2.1 cm.  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 Also reports food getting stuck in throat.\par There is no history of radiation therapy to the head or neck.\par There is no family history of thyroid cancer.\par \par She saw Dr. Booker for tonsil hypertrophy which is currently being monitored.

## 2023-03-08 NOTE — ASSESSMENT
[FreeTextEntry1] : This is a 52-year-old female with multinodular goiter, Hashimoto's thyroiditis, migraines, anxiety, depression, hypertension, asthma, vitamin D insufficiency, brain cyst, panic disorder/agoraphobia, here for follow-up.\par Per patient PCP noticed thyroid enlargement in February 2022.  She had neck ultrasound on February 7, 2022 which showed right lobe 6.4 x 2.1 x 2.5 cm, left lobe 5.7 x 2.4 x 2.3 cm, isthmus measuring 6 mm.\par Right lobe mostly solid isoechoic to hyperechoic nodules, largest measuring 2 x 1.7 x 1.5 cm.\par Left mostly solid isoechoic nodules largest measuring 1.7 x 2.1 x 2.1 cm.  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  Hashimoto's thyroiditis reviewed. Will avoid starting thyroid hormone replacement at this time due to history of severe anxiety.\par Check TFTs today.\par

## 2023-03-08 NOTE — PHYSICAL EXAM
[Alert] : alert [Well Nourished] : well nourished [Healthy Appearance] : healthy appearance [No Acute Distress] : no acute distress [Well Developed] : well developed [Normal Voice/Communication] : normal voice communication [Normal Sclera/Conjunctiva] : normal sclera/conjunctiva [No Proptosis] : no proptosis [No LAD] : no lymphadenopathy [Supple] : the neck was supple [Normal Affect] : the affect was normal [Normal Insight/Judgement] : insight and judgment were intact [Normal Mood] : the mood was normal [de-identified] : Enlarged thyroid gland with bilateral palpable thyroid nodules

## 2023-03-08 NOTE — ADDENDUM
[FreeTextEntry1] : By signing my name below, I, Alicia Govea, attest that this document has been prepared under the direction and in the presence of Dr. Rossi.\par \par I, Eli Rossi MD, personally performed the services described in this documentation. All medical record entries made by the scribe were at my discretion and in my presence. I have reviewed the chart and discharge instructions (if applicable) and agree that the record reflects my personal permanence and is accurate and complete.\par

## 2023-03-11 LAB
T4 FREE SERPL-MCNC: 1.2 NG/DL
TSH SERPL-ACNC: 1.09 UIU/ML

## 2023-03-13 ENCOUNTER — NON-APPOINTMENT (OUTPATIENT)
Age: 53
End: 2023-03-13

## 2023-03-14 ENCOUNTER — TRANSCRIPTION ENCOUNTER (OUTPATIENT)
Age: 53
End: 2023-03-14

## 2023-03-15 ENCOUNTER — APPOINTMENT (OUTPATIENT)
Dept: OTOLARYNGOLOGY | Facility: CLINIC | Age: 53
End: 2023-03-15

## 2023-03-15 ENCOUNTER — TRANSCRIPTION ENCOUNTER (OUTPATIENT)
Age: 53
End: 2023-03-15

## 2023-03-15 ENCOUNTER — NON-APPOINTMENT (OUTPATIENT)
Age: 53
End: 2023-03-15

## 2023-03-15 RX ORDER — SIMETHICONE 80 MG/1
80 TABLET, CHEWABLE ORAL
Qty: 90 | Refills: 0 | Status: ACTIVE | COMMUNITY
Start: 2023-03-15 | End: 1900-01-01

## 2023-03-15 RX ORDER — LANSOPRAZOLE
3 KIT DAILY
Qty: 1 | Refills: 2 | Status: ACTIVE | COMMUNITY
Start: 2023-03-15 | End: 1900-01-01

## 2023-03-16 ENCOUNTER — TRANSCRIPTION ENCOUNTER (OUTPATIENT)
Age: 53
End: 2023-03-16

## 2023-03-17 ENCOUNTER — TRANSCRIPTION ENCOUNTER (OUTPATIENT)
Age: 53
End: 2023-03-17

## 2023-03-20 ENCOUNTER — APPOINTMENT (OUTPATIENT)
Dept: MRI IMAGING | Facility: CLINIC | Age: 53
End: 2023-03-20
Payer: MEDICAID

## 2023-03-20 ENCOUNTER — TRANSCRIPTION ENCOUNTER (OUTPATIENT)
Age: 53
End: 2023-03-20

## 2023-03-20 ENCOUNTER — OUTPATIENT (OUTPATIENT)
Dept: OUTPATIENT SERVICES | Facility: HOSPITAL | Age: 53
LOS: 1 days | End: 2023-03-20
Payer: MEDICAID

## 2023-03-20 DIAGNOSIS — Q04.6 CONGENITAL CEREBRAL CYSTS: ICD-10-CM

## 2023-03-20 PROCEDURE — 70553 MRI BRAIN STEM W/O & W/DYE: CPT

## 2023-03-20 PROCEDURE — A9585: CPT

## 2023-03-20 PROCEDURE — 70553 MRI BRAIN STEM W/O & W/DYE: CPT | Mod: 26

## 2023-03-22 ENCOUNTER — RX RENEWAL (OUTPATIENT)
Age: 53
End: 2023-03-22

## 2023-03-23 ENCOUNTER — TRANSCRIPTION ENCOUNTER (OUTPATIENT)
Age: 53
End: 2023-03-23

## 2023-03-27 ENCOUNTER — APPOINTMENT (OUTPATIENT)
Dept: OTOLARYNGOLOGY | Facility: CLINIC | Age: 53
End: 2023-03-27
Payer: MEDICAID

## 2023-03-27 PROCEDURE — 92526 ORAL FUNCTION THERAPY: CPT | Mod: GN

## 2023-03-28 RX ORDER — LORAZEPAM 0.5 MG/1
0.5 TABLET ORAL
Qty: 27 | Refills: 0 | Status: ACTIVE | COMMUNITY
Start: 2021-08-10 | End: 1900-01-01

## 2023-03-28 NOTE — ED CDU PROVIDER INITIAL DAY NOTE - MEDICAL DECISION MAKING DETAILS
Yes 48yF w/pmhx HTN, asthma, panic attacks p/w dizziness and lightheadedness x 1 week, intermittent. CT head with small colloid cyst, non focal neuro exam. Sent to CDU for MRI and neurosurgery consult. Pt is asymptomatic at this time 48yF w/pmhx HTN, asthma, panic attacks p/w dizziness and lightheadedness x 1 week, intermittent. CT head with small colloid cyst, non focal neuro exam. Sent to CDU for MRI and neurosurgery consult. Pt is asymptomatic at this time  As per [employer]: "The headaches are typically frontal, intermittent, severe, of short duration, and associated with nausea and vomiting. The headache can be relieved by lying down. Occasionally, gait abnormalities may occur as a result of hydrocephalus. The classic clinical description of intermittent headaches and drop attacks occurs in only one-third of patients. Rarely, sudden death can occur from obstruction of the ventricular system.    Imaging demonstrates iso- or hyperdense lesions on noncontrast CT. On MRI they may be T2 hyperintense or have a hypointense center. They do not enhance.  Surgical excision is curative, although it may be technically difficult. For patients with hydrocephalus, a ventriculoperitoneal shunt may be necessary. Stereotactic aspiration of a cyst may be useful, although there is a rate of high recurrence. Patients with small asymptomatic colloid cysts without evidence of hydrocephalus may be closely followed by serial examinations and neuroimaging studies."

## 2023-03-29 ENCOUNTER — TRANSCRIPTION ENCOUNTER (OUTPATIENT)
Age: 53
End: 2023-03-29

## 2023-03-30 ENCOUNTER — APPOINTMENT (OUTPATIENT)
Dept: ULTRASOUND IMAGING | Facility: IMAGING CENTER | Age: 53
End: 2023-03-30
Payer: MEDICAID

## 2023-03-30 ENCOUNTER — OUTPATIENT (OUTPATIENT)
Dept: OUTPATIENT SERVICES | Facility: HOSPITAL | Age: 53
LOS: 1 days | End: 2023-03-30
Payer: MEDICAID

## 2023-03-30 ENCOUNTER — RESULT REVIEW (OUTPATIENT)
Age: 53
End: 2023-03-30

## 2023-03-30 ENCOUNTER — APPOINTMENT (OUTPATIENT)
Dept: MAMMOGRAPHY | Facility: IMAGING CENTER | Age: 53
End: 2023-03-30
Payer: MEDICAID

## 2023-03-30 DIAGNOSIS — Z00.8 ENCOUNTER FOR OTHER GENERAL EXAMINATION: ICD-10-CM

## 2023-03-30 PROCEDURE — 77067 SCR MAMMO BI INCL CAD: CPT | Mod: 26

## 2023-03-30 PROCEDURE — 77063 BREAST TOMOSYNTHESIS BI: CPT

## 2023-03-30 PROCEDURE — 77063 BREAST TOMOSYNTHESIS BI: CPT | Mod: 26

## 2023-03-30 PROCEDURE — 77067 SCR MAMMO BI INCL CAD: CPT

## 2023-03-30 PROCEDURE — 76641 ULTRASOUND BREAST COMPLETE: CPT | Mod: 26,50

## 2023-03-30 PROCEDURE — 76641 ULTRASOUND BREAST COMPLETE: CPT

## 2023-03-31 ENCOUNTER — APPOINTMENT (OUTPATIENT)
Dept: NEUROSURGERY | Facility: CLINIC | Age: 53
End: 2023-03-31
Payer: MEDICAID

## 2023-03-31 PROCEDURE — 99442: CPT

## 2023-04-02 NOTE — REASON FOR VISIT
[Follow-Up: _____] : a [unfilled] follow-up visit [Home] : at home, [unfilled] , at the time of the visit. [Medical Office: ([facility])___] : at the medical office located in  [Verbal consent obtained from patient] : the patient, [unfilled]

## 2023-04-03 ENCOUNTER — RESULT REVIEW (OUTPATIENT)
Age: 53
End: 2023-04-03

## 2023-04-03 DIAGNOSIS — R92.8 OTHER ABNORMAL AND INCONCLUSIVE FINDINGS ON DIAGNOSTIC IMAGING OF BREAST: ICD-10-CM

## 2023-04-03 NOTE — ASSESSMENT
[FreeTextEntry1] : MRI BRAIN 3/20/23\par Small focus of abnormal signal just anterior third ventricles again seen. This appears to be similar signal to brain parenchyma on the T1-weighted sequence and slight decrease signal on the T2-weighted sequence. This finding measures approximately 5.7 mm and could be compatible with a colloid cyst versus area volume averaging.\par Cyst stable in size\par PLAN:\par MRI brain w;wo contrast in 1 year

## 2023-04-03 NOTE — HISTORY OF PRESENT ILLNESS
[Home] : at home, [unfilled] , at the time of the visit. [Medical Office: ([facility])___] : at the medical office located in  [Verbal consent obtained from patient] : the patient, [unfilled] [FreeTextEntry1] : Te Zuluaga is a pleasant right handed 52 year old lady of  (French) descent who presented for consultation regarding a small colloid cyst that was found incidentally on MRI brain done during an ER visit at [facility] in 2019. At that time patient had recent titration of BP medications she had an episode of elevated blood pressure, nausea, dizziness, generalized pain, head pressure. She described pain as being sharp, 5-6/10 and more so in both temporal areas. Pain was controlled with Tylenol prn.\par \par She is currently under the care of her PCP Dr. Teo Portillo,[address] [place] [id number], [phone]. She also has a history of migraine headaches. Insurance did not cover Nurtec nor Ubrelvy however pain is managed with Tylenol PRN.\par \par PMHX includes HTN, asthma, anxiety disorder, social anxiety, migraine headaches and claustrophobia.\par \par Social history,  with 1 child (5 years old). \par \par Today she presents for a follow up visit after having MRI brain done on 3/20/23 to discuss results and plan of care.\par

## 2023-04-04 ENCOUNTER — APPOINTMENT (OUTPATIENT)
Dept: ULTRASOUND IMAGING | Facility: IMAGING CENTER | Age: 53
End: 2023-04-04

## 2023-04-04 ENCOUNTER — OUTPATIENT (OUTPATIENT)
Dept: OUTPATIENT SERVICES | Facility: HOSPITAL | Age: 53
LOS: 1 days | End: 2023-04-04
Payer: MEDICAID

## 2023-04-04 ENCOUNTER — APPOINTMENT (OUTPATIENT)
Dept: ULTRASOUND IMAGING | Facility: IMAGING CENTER | Age: 53
End: 2023-04-04
Payer: MEDICAID

## 2023-04-04 ENCOUNTER — RESULT REVIEW (OUTPATIENT)
Age: 53
End: 2023-04-04

## 2023-04-04 ENCOUNTER — TRANSCRIPTION ENCOUNTER (OUTPATIENT)
Age: 53
End: 2023-04-04

## 2023-04-04 DIAGNOSIS — E06.3 AUTOIMMUNE THYROIDITIS: ICD-10-CM

## 2023-04-04 DIAGNOSIS — Z00.8 ENCOUNTER FOR OTHER GENERAL EXAMINATION: ICD-10-CM

## 2023-04-04 PROCEDURE — 76536 US EXAM OF HEAD AND NECK: CPT

## 2023-04-04 PROCEDURE — 76536 US EXAM OF HEAD AND NECK: CPT | Mod: 26

## 2023-04-04 PROCEDURE — 76642 ULTRASOUND BREAST LIMITED: CPT

## 2023-04-04 PROCEDURE — 76642 ULTRASOUND BREAST LIMITED: CPT | Mod: 26,RT

## 2023-04-11 ENCOUNTER — RESULT REVIEW (OUTPATIENT)
Age: 53
End: 2023-04-11

## 2023-04-11 ENCOUNTER — OUTPATIENT (OUTPATIENT)
Dept: OUTPATIENT SERVICES | Facility: HOSPITAL | Age: 53
LOS: 1 days | End: 2023-04-11
Payer: MEDICAID

## 2023-04-11 ENCOUNTER — APPOINTMENT (OUTPATIENT)
Dept: ULTRASOUND IMAGING | Facility: IMAGING CENTER | Age: 53
End: 2023-04-11
Payer: MEDICAID

## 2023-04-11 DIAGNOSIS — R92.8 OTHER ABNORMAL AND INCONCLUSIVE FINDINGS ON DIAGNOSTIC IMAGING OF BREAST: ICD-10-CM

## 2023-04-11 PROCEDURE — 77065 DX MAMMO INCL CAD UNI: CPT | Mod: 26,RT

## 2023-04-11 PROCEDURE — 88342 IMHCHEM/IMCYTCHM 1ST ANTB: CPT

## 2023-04-11 PROCEDURE — 88360 TUMOR IMMUNOHISTOCHEM/MANUAL: CPT | Mod: 26,59

## 2023-04-11 PROCEDURE — 19083 BX BREAST 1ST LESION US IMAG: CPT

## 2023-04-11 PROCEDURE — 77065 DX MAMMO INCL CAD UNI: CPT

## 2023-04-11 PROCEDURE — 88341 IMHCHEM/IMCYTCHM EA ADD ANTB: CPT | Mod: 26

## 2023-04-11 PROCEDURE — 19083 BX BREAST 1ST LESION US IMAG: CPT | Mod: RT

## 2023-04-11 PROCEDURE — 88360 TUMOR IMMUNOHISTOCHEM/MANUAL: CPT

## 2023-04-11 PROCEDURE — 88307 TISSUE EXAM BY PATHOLOGIST: CPT

## 2023-04-11 PROCEDURE — A4648: CPT

## 2023-04-11 PROCEDURE — 88307 TISSUE EXAM BY PATHOLOGIST: CPT | Mod: 26

## 2023-04-11 PROCEDURE — 88341 IMHCHEM/IMCYTCHM EA ADD ANTB: CPT

## 2023-04-11 PROCEDURE — 88342 IMHCHEM/IMCYTCHM 1ST ANTB: CPT | Mod: 26

## 2023-04-13 ENCOUNTER — NON-APPOINTMENT (OUTPATIENT)
Age: 53
End: 2023-04-13

## 2023-04-17 ENCOUNTER — NON-APPOINTMENT (OUTPATIENT)
Age: 53
End: 2023-04-17

## 2023-04-17 ENCOUNTER — APPOINTMENT (OUTPATIENT)
Dept: CARDIOLOGY | Facility: CLINIC | Age: 53
End: 2023-04-17
Payer: MEDICAID

## 2023-04-17 VITALS
WEIGHT: 170 LBS | BODY MASS INDEX: 31.28 KG/M2 | DIASTOLIC BLOOD PRESSURE: 85 MMHG | OXYGEN SATURATION: 100 % | HEART RATE: 82 BPM | HEIGHT: 62 IN | SYSTOLIC BLOOD PRESSURE: 127 MMHG

## 2023-04-17 DIAGNOSIS — I10 ESSENTIAL (PRIMARY) HYPERTENSION: ICD-10-CM

## 2023-04-17 PROCEDURE — 99214 OFFICE O/P EST MOD 30 MIN: CPT | Mod: 25

## 2023-04-17 PROCEDURE — 93000 ELECTROCARDIOGRAM COMPLETE: CPT

## 2023-04-17 NOTE — DISCUSSION/SUMMARY
[FreeTextEntry1] : Overall doing well from a cardiac standpoint.\par Continue present medications except add Crestor 5 mg. \par Repeat labs after 6 weeks. \par Lifestyle modifications discussed inclusive of diet and exercise strategies, sleep optimization and stress reduction. \par Follow up in 6 months.  [EKG obtained to assist in diagnosis and management of assessed problem(s)] : EKG obtained to assist in diagnosis and management of assessed problem(s)

## 2023-04-17 NOTE — HISTORY OF PRESENT ILLNESS
[FreeTextEntry1] : Ms. ELIZONDO has a history of CAD. He denies a change in symptoms. He has no chest pain. No SOB. No palpitations. \par Of note she had right axillary lymph node biopsy. Awaiting results. \par She gets the achiness in the chest. She also feels the same pounding.

## 2023-05-02 ENCOUNTER — TRANSCRIPTION ENCOUNTER (OUTPATIENT)
Age: 53
End: 2023-05-02

## 2023-05-02 ENCOUNTER — RX CHANGE (OUTPATIENT)
Age: 53
End: 2023-05-02

## 2023-05-02 RX ORDER — FLUTICASONE PROPIONATE AND SALMETEROL 250; 50 UG/1; UG/1
250-50 POWDER RESPIRATORY (INHALATION)
Qty: 60 | Refills: 5 | Status: ACTIVE | COMMUNITY
Start: 2023-05-02 | End: 1900-01-01

## 2023-05-02 RX ORDER — FLUTICASONE PROPIONATE AND SALMETEROL 250; 50 UG/1; UG/1
250-50 POWDER RESPIRATORY (INHALATION)
Qty: 60 | Refills: 5 | Status: DISCONTINUED | COMMUNITY
Start: 2023-03-22 | End: 2023-05-02

## 2023-05-03 ENCOUNTER — TRANSCRIPTION ENCOUNTER (OUTPATIENT)
Age: 53
End: 2023-05-03

## 2023-05-04 DIAGNOSIS — R00.2 PALPITATIONS: ICD-10-CM

## 2023-05-11 ENCOUNTER — TRANSCRIPTION ENCOUNTER (OUTPATIENT)
Age: 53
End: 2023-05-11

## 2023-05-12 ENCOUNTER — TRANSCRIPTION ENCOUNTER (OUTPATIENT)
Age: 53
End: 2023-05-12

## 2023-05-16 ENCOUNTER — APPOINTMENT (OUTPATIENT)
Dept: PSYCHIATRY | Facility: CLINIC | Age: 53
End: 2023-05-16

## 2023-05-16 NOTE — DISCUSSION/SUMMARY
[FreeTextEntry1] : The patient is a 49 yo woman with hx of sx consistent with panic disorder with agoraphobia, had very brief trial on Paxil, stopped due to side effects, and now taking Ative 0.5 mg once a week as needed for severe anxiety/panic attacks, continues to have anxiety about panic attacks and avoids leaving home or going to certain places for fear of having panic attack, states she prefers to not take meds and wants to try non- pharmacological therapy. \par \par 1/31/2022: Patient states she was unable to start cognitive behavioral therapy for Agoraphobia.  She reports no change in her symptoms, continues to have frequent severe anxiety and fear of having panic attacks and not leaving the house, interfering with her ability functioning at her previous baseline.  Patient reports chronic daily migraine headaches, was prescribed nortriptyline but is not taking, does not want to take medications for panic disorder feels that she can manage it with using Ativan as needed as long as she is able to get back to being able to drive and function.  Considering increased stress, anxiety and insomnia could be contributing to migraine headaches advised patient to start taking nortriptyline which could also help with migraines and anxiety symptoms.\par \par 12/19/2022: Patient reported that she was doing cognitive behavior therapy after last visit which she felt was helping but that had to stop because of change in insurance since she has not been able to find anybody in any progress that she had made was lost and she is reporting that she is continuing to have increased anxiety and getting overwhelmed for simple things and then that triggers a panic attack and also continues to have fear of having panic attacks and not leaving the house, interfering with her ability functioning at her previous baseline. \par Patient did not start nortriptyline because of anxiety about side effects and she was recently prescribed duloxetine by her neurologist for migraine headaches which she also did not start taking.\par Discussed with the patient that taking duloxetine could benefit both anxiety symptoms and help with panic disorder and also migraine headaches.  Patient was worried about duloxetine increasing her blood pressure.  She is currently on 2 antihypertensive medications and reports that her blood pressure on the medication ranges between 120/80 to 140 /90 especially increases when she is more anxious.  Education provided to the patient about the side effects and that she could monitor her blood pressure after taking the duloxetine for 1 week and there is overall increase in blood pressure and not just when she is anxious then she can hold the duloxetine and we can discuss other medication options.\par 5/15/2023: \par

## 2023-05-16 NOTE — PLAN
[Medication education provided] : Medication education provided. [Rationale for medication choices, possible risks/precautions, benefits, alternative treatment choices, and consequences of non-treatment discussed] : Rationale for medication choices, possible risks/precautions, benefits, alternative treatment choices, and consequences of non-treatment discussed with patient/family/caregiver  [FreeTextEntry5] : Psychoeducation and supportive therapy provided,  and discussed rationale for recommended med changes \par Behavior activation\par Sleep hygiene education\par Medication:  Duloxetine 20 mg/day or a week, then increase to 30 mg/day\par  Ativan 0.5 mg once a day prn for severe anxiety, monitor use\par Discussed with patient adverse effects of longer term/frequent use of BZD, potential to develop tolerance to the dose effects and develop dependence, and potential for addiction. Advise patient not to drive or operate heavy machinery immediately after taking the medication. Also educated patient about safe use/and keeping meds safely, and to not share medications with family/friends. \par Educated patient of importance of remaining abstinent from drugs and alcohol. \par Emergency procedures were discussed: pt. educated to call 911 or go to nearest ER for worsening of symptoms/suicidal/homicidal ideation. \par Gave patient contact information for [facility]- CBT practice psychotherapy, and couple of outside psychologists and advised also to check out practice for availability for therapist\par RTC in 4 weeks or earlier as needed \par Patient given opportunity to ask questions and their questions were answered and they expressed understanding and agreement with above plan.\par \par I stop checked today: Reference #: [id number]\par \par Practitioner Count: 2\par Pharmacy Count: 1\par Current Opioid Prescriptions: 0\par Current Benzodiazepine Prescriptions: 0\par Current Stimulant Prescriptions: 0\par \par \par Patient Demographic Information (PDI)\par    \par PDI	First Name	Last Name	Birth Date	Gender	Street Address	[facility]	Zip Code\par GUSTAVO	Suejose Yoderisidoro	1970	Female	[id number]	[facility]	[address]\par \par Prescription Information\par   \par PDI Filter:  \par PDI	My Rx	Current Rx	Drug Type	Rx Written	Rx Dispensed	Drug	Quantity	Days Supply	Prescriber Name	Prescriber LEVI #	Payment Method	Dispenser\par A	Y	N	B	03/28/2023	03/31/2023	lorazepam 0.5 mg tablet	27	27	Macie Doyle	[id number]	Medicaid	[id number]\par A	N	N	B	02/01/2023	02/27/2023	lorazepam 1 mg tablet	1	1	Gricel Askew (NP)	[id number]	Medicaid	[id number]\par A	Y	N	B	12/19/2022	12/26/2022	lorazepam 0.5 mg tablet	30	30	Maice Doyle	[id number]	Medicaid	[id number]\par A	N	N	B	09/14/2022	09/14/2022	lorazepam 0.5 mg tablet	5	5	Jeanie Ramsey MD	[id number]	Medicaid	[id number]\par

## 2023-05-16 NOTE — HISTORY OF PRESENT ILLNESS
[FreeTextEntry1] : Patient was last seen for a follow-up visit on January 31, 2022.  Patient had not yet started CBT at that time and she reported that she was continuing to have frequent severe anxiety and fear of having panic attacks and was not leaving the house and the symptoms were interfering with her ability to function at her previous baseline.  She also had chronic migraine headaches for which she was prescribed nortriptyline and she had not yet started taking it.  She was referred to CBT tape practice and was advised to start nortriptyline which could help with both the migraine headaches and anxiety symptoms. \par Patient reported today that after last visit she started seeing Dr. Borden at the CBT practice but because of change in insurance she could not continue therapy after June 2022.  Patient reported that with therapy she started to slowly make changes and was able to manage her anxiety symptoms but since she could not continue the therapy and has not been able to find anyone other therapists that she reported that "I have gone backwards" and she states that even to  her daughter from her school she gets very nervous and anxious and she has gone to the emergency room with a panic attack because when she starts to feel anxious she has chest pain and thinks that she is having a panic attack.  Patient reported that she did not start taking nortriptyline because she was afraid of the side effects.  Patient states even with the migraine she has dizziness as one of the symptoms so she was afraid that if she were to take nortriptyline that also contributed to dizziness then she would feel more anxious and when she saw Dr. Gomez in October 2022 he had prescribed duloxetine instead.  Patient states she did not really start taking duloxetine because again she was worried about side effects.  Patient reported that she is able to fall asleep but her sleep is interrupted and she is having a hard time falling back to sleep.\par Discussed with the patient that taking duloxetine could benefit both anxiety symptoms and help with panic disorder and also migraine headaches.  Patient was worried about duloxetine increasing her blood pressure.  She is currently on 2 antihypertensive medications and reports that her blood pressure on the medication ranges between 120/80 to 140 /90 especially increases when she is more anxious.  Education provided to the patient about the side effects and that she could monitor her blood pressure after taking the duloxetine for 1 week and there is overall increase in blood pressure and not just when she is anxious then she can hold the duloxetine and we can discuss other medication options.\par

## 2023-05-16 NOTE — PHYSICAL EXAM
[None] : none [Average] : average [Cooperative] : cooperative [Euthymic] : euthymic [Full] : full [Clear] : clear [Linear/Goal Directed] : linear/goal directed [None Reported] : none reported [WNL] : within normal limits [de-identified] : no SI/HI

## 2023-05-17 ENCOUNTER — APPOINTMENT (OUTPATIENT)
Dept: ELECTROPHYSIOLOGY | Facility: CLINIC | Age: 53
End: 2023-05-17
Payer: MEDICAID

## 2023-05-17 ENCOUNTER — NON-APPOINTMENT (OUTPATIENT)
Age: 53
End: 2023-05-17

## 2023-05-18 ENCOUNTER — APPOINTMENT (OUTPATIENT)
Dept: OTOLARYNGOLOGY | Facility: CLINIC | Age: 53
End: 2023-05-18
Payer: MEDICAID

## 2023-05-18 VITALS
DIASTOLIC BLOOD PRESSURE: 69 MMHG | SYSTOLIC BLOOD PRESSURE: 127 MMHG | HEART RATE: 71 BPM | WEIGHT: 170 LBS | HEIGHT: 62 IN | OXYGEN SATURATION: 100 % | BODY MASS INDEX: 31.28 KG/M2

## 2023-05-18 DIAGNOSIS — J35.1 HYPERTROPHY OF TONSILS: ICD-10-CM

## 2023-05-18 PROCEDURE — 31575 DIAGNOSTIC LARYNGOSCOPY: CPT

## 2023-05-18 PROCEDURE — 99214 OFFICE O/P EST MOD 30 MIN: CPT | Mod: 25

## 2023-05-18 NOTE — CONSULT LETTER
[Dear  ___] : Dear  [unfilled], [Courtesy Letter:] : I had the pleasure of seeing your patient, [unfilled], in my office today. [Please see my note below.] : Please see my note below. [Consult Closing:] : Thank you very much for allowing me to participate in the care of this patient.  If you have any questions, please do not hesitate to contact me. [Sincerely,] : Sincerely, [FreeTextEntry2] : Dr Eli Rossi  [FreeTextEntry3] : \par Herb Jimenez MD, FACS\par \par Otolaryngology-Head and Neck Surgery\par [employer] [facility]\par

## 2023-05-24 ENCOUNTER — LABORATORY RESULT (OUTPATIENT)
Age: 53
End: 2023-05-24

## 2023-05-24 ENCOUNTER — APPOINTMENT (OUTPATIENT)
Dept: PEDIATRIC ALLERGY IMMUNOLOGY | Facility: CLINIC | Age: 53
End: 2023-05-24
Payer: MEDICAID

## 2023-05-24 ENCOUNTER — NON-APPOINTMENT (OUTPATIENT)
Age: 53
End: 2023-05-24

## 2023-05-24 VITALS
HEART RATE: 76 BPM | BODY MASS INDEX: 31.28 KG/M2 | WEIGHT: 170 LBS | HEIGHT: 62 IN | OXYGEN SATURATION: 98 % | SYSTOLIC BLOOD PRESSURE: 135 MMHG | DIASTOLIC BLOOD PRESSURE: 85 MMHG

## 2023-05-24 DIAGNOSIS — J30.1 ALLERGIC RHINITIS DUE TO POLLEN: ICD-10-CM

## 2023-05-24 DIAGNOSIS — T78.1XXA OTHER ADVERSE FOOD REACTIONS, NOT ELSEWHERE CLASSIFIED, INITIAL ENCOUNTER: ICD-10-CM

## 2023-05-24 PROCEDURE — 99204 OFFICE O/P NEW MOD 45 MIN: CPT | Mod: 25

## 2023-05-24 PROCEDURE — 94010 BREATHING CAPACITY TEST: CPT

## 2023-05-24 RX ORDER — CETIRIZINE HYDROCHLORIDE 10 MG/1
10 TABLET, COATED ORAL
Qty: 1 | Refills: 3 | Status: ACTIVE | COMMUNITY
Start: 2023-05-24 | End: 1900-01-01

## 2023-05-24 NOTE — REVIEW OF SYSTEMS
[Nl] : Genitourinary [Immunizations are up to date] : Immunizations are up to date [Received Influenza Vaccine this Past Year] : Patient has received the Influenza vaccine this past year [COVID-19 Vaccination Complete] : COVID-19 vaccination complete

## 2023-05-30 LAB
A ALTERNATA IGE QN: <0.1 KUA/L
A FUMIGATUS IGE QN: <0.1 KUA/L
AMER BEECH IGE QN: 3
BERMUDA GRASS IGE QN: 0.16 KUA/L
BLUE MUSSEL IGE QN: <0.1 KUA/L
BOXELDER IGE QN: 0.25 KUA/L
C HERBARUM IGE QN: <0.1 KUA/L
C LUNATA IGE QN: <0.1 KUA/L
CALIF WALNUT IGE QN: 0.58 KUA/L
CAT DANDER IGE QN: <0.1 KUA/L
CLAM IGE QN: <0.1 KUA/L
CMN PIGWEED IGE QN: 0.16 KUA/L
COCKLEBUR IGE QN: 0.13 KUA/L
COCKSFOOT IGE QN: 0.15 KUA/L
COMMON RAGWEED IGE QN: 0.21 KUA/L
COTTONWOOD IGE QN: 0.21 KUA/L
CRAB IGE QN: <0.1 KUA/L
D FARINAE IGE QN: <0.1 KUA/L
D PTERONYSS IGE QN: <0.1 KUA/L
DEPRECATED A ALTERNATA IGE RAST QL: 0
DEPRECATED A FUMIGATUS IGE RAST QL: 0
DEPRECATED A PULLULANS IGE RAST QL: 0
DEPRECATED AMER BEECH IGE RAST QL: 16.1 KUA/L
DEPRECATED BERMUDA GRASS IGE RAST QL: NORMAL
DEPRECATED BLUE MUSSEL IGE RAST QL: 0
DEPRECATED BOXELDER IGE RAST QL: NORMAL
DEPRECATED C HERBARUM IGE RAST QL: 0
DEPRECATED C LUNATA IGE RAST QL: 0
DEPRECATED CAT DANDER IGE RAST QL: 0
DEPRECATED CLAM IGE RAST QL: 0
DEPRECATED COCKLEBUR IGE RAST QL: NORMAL
DEPRECATED COCKSFOOT IGE RAST QL: NORMAL
DEPRECATED COMMON PIGWEED IGE RAST QL: NORMAL
DEPRECATED COMMON RAGWEED IGE RAST QL: NORMAL
DEPRECATED COTTONWOOD IGE RAST QL: NORMAL
DEPRECATED CRAB IGE RAST QL: 0
DEPRECATED D FARINAE IGE RAST QL: 0
DEPRECATED D PTERONYSS IGE RAST QL: 0
DEPRECATED DOG DANDER IGE RAST QL: 0
DEPRECATED ENGL PLANTAIN IGE RAST QL: NORMAL
DEPRECATED F MONILIFORME IGE RAST QL: 0
DEPRECATED GIANT RAGWEED IGE RAST QL: NORMAL
DEPRECATED GOOSE FEATHER IGE RAST QL: 0
DEPRECATED GOOSEFOOT IGE RAST QL: NORMAL
DEPRECATED JOHNSON GRASS IGE RAST QL: 0
DEPRECATED KENT BLUE GRASS IGE RAST QL: NORMAL
DEPRECATED LOBSTER IGE RAST QL: 0
DEPRECATED LONDON PLANE IGE RAST QL: 3
DEPRECATED MEADOW FESCUE IGE RAST QL: NORMAL
DEPRECATED MOUSE URINE PROT IGE RAST QL: 0
DEPRECATED MUGWORT IGE RAST QL: 0
DEPRECATED OYSTER IGE RAST QL: 0
DEPRECATED P NOTATUM IGE RAST QL: 0
DEPRECATED PINEAPPLE IGE RAST QL: NORMAL
DEPRECATED RED CEDAR IGE RAST QL: NORMAL
DEPRECATED RED TOP GRASS IGE RAST QL: NORMAL
DEPRECATED ROACH IGE RAST QL: NORMAL
DEPRECATED RYE IGE RAST QL: NORMAL
DEPRECATED SALTWORT IGE RAST QL: 0
DEPRECATED SCALLOP IGE RAST QL: <0.1 KUA/L
DEPRECATED SHRIMP IGE RAST QL: NORMAL
DEPRECATED SILVER BIRCH IGE RAST QL: 4
DEPRECATED STRAWBERRY IGE RAST QL: 1
DEPRECATED SW VERNAL GRASS IGE RAST QL: NORMAL
DEPRECATED TIMOTHY IGE RAST QL: 0
DEPRECATED WHITE ASH IGE RAST QL: 1
DEPRECATED WHITE HICKORY IGE RAST QL: 2
DEPRECATED WHITE OAK IGE RAST QL: 3
DOG DANDER IGE QN: <0.1 KUA/L
EGGPLANT (RF262) CLASS: NORMAL
EGGPLANT (RF262) CONC: 0.16 KUA/L
ENGL PLANTAIN IGE QN: 0.17 KUA/L
F MONILIFORME IGE QN: <0.1 KUA/L
GIANT RAGWEED IGE QN: 0.1 KUA/L
GOOSE FEATHER IGE QN: <0.1 KUA/L
GOOSEFOOT IGE QN: 0.16 KUA/L
JOHNSON GRASS IGE QN: <0.1 KUA/L
KENT BLUE GRASS IGE QN: 0.18 KUA/L
LOBSTER IGE QN: <0.1 KUA/L
LONDON PLANE IGE QN: 4.44 KUA/L
MEADOW FESCUE IGE QN: 0.11 KUA/L
MOLD (AUREOBASIDIUM M12) CONC: <0.1 KUA/L
MOUSE URINE PROT IGE QN: <0.1 KUA/L
MUGWORT IGE QN: <0.1 KUA/L
MULBERRY (T70) CLASS: 0
MULBERRY (T70) CONC: <0.1 KUA/L
OYSTER IGE QN: <0.1 KUA/L
P NOTATUM IGE QN: <0.1 KUA/L
PINEAPPLE IGE QN: 0.14 KUA/L
RED CEDAR IGE QN: 0.18 KUA/L
RED TOP GRASS IGE QN: 0.17 KUA/L
ROACH IGE QN: 0.15 KUA/L
RYE IGE QN: 0.13 KUA/L
SALTWORT IGE QN: <0.1 KUA/L
SCALLOP IGE QN: 0
SCALLOP IGE QN: 0.16 KUA/L
SILVER BIRCH IGE QN: 18.7 KUA/L
STRAWBERRY IGE QN: 0.36 KUA/L
SW VERNAL GRASS IGE QN: 0.22 KUA/L
TIMOTHY IGE QN: <0.1 KUA/L
TREE ALLERG MIX1 IGE QL: 1
WHITE ASH IGE QN: 0.41 KUA/L
WHITE ELM IGE QN: 0.55 KUA/L
WHITE ELM IGE QN: 1
WHITE HICKORY IGE QN: 0.92 KUA/L
WHITE OAK IGE QN: 10.4 KUA/L

## 2023-05-31 PROCEDURE — 93244 EXT ECG>48HR<7D REV&INTERPJ: CPT

## 2023-06-06 PROBLEM — T78.1XXA ORAL ALLERGY SYNDROME: Status: ACTIVE | Noted: 2023-06-06

## 2023-06-06 NOTE — SOCIAL HISTORY
[Apartment] : [unfilled] lives in an apartment  [None] : none [Smokers in Household] : there are no smokers in the home [de-identified] : lives with BF and daughter [de-identified] : wood floor

## 2023-06-06 NOTE — CONSULT LETTER
[Dear  ___] : Dear  [unfilled], [Consult Letter:] : I had the pleasure of evaluating your patient, [unfilled]. [Please see my note below.] : Please see my note below. [Consult Closing:] : Thank you very much for allowing me to participate in the care of this patient.  If you have any questions, please do not hesitate to contact me. [Sincerely,] : Sincerely, [FreeTextEntry2] : Hannah Ramsey MD [FreeTextEntry3] : Joelle Guerra MD\par Attending Physician \par Division of Allergy/Immunology \par [facility] \par \par  of Medicine and Pediatrics\par [facility] \par \par [address]\[place] [address]\par Tel: [phone]\par Fax: [phone]\par Email: [email or web address]\par \par \par \par

## 2023-06-06 NOTE — HISTORY OF PRESENT ILLNESS
[de-identified] : Ms. Zuluaga is a 52 year old woman with a history of asthma and allergies who presents for initial allergy evaluation.\par \par She has been suffering form bad allergies this season.\par Sx started a few years ago  but never really bothered her until this season.\par \par Allergic rhinitis: Symptoms include nasal congestion, rhinorrhea, sneezing, post-nasal drip, ocular pruritus, nasal pruritus, watery eyes, ear fullness, sinus pressure, and mouth breathing.\par Symptoms are present all year long.\par Medications include claritin and nasal sprays.\par \par Takes claritin for sx which helps but she is reliant on it.\par Also takes flonase and azelastine.\par \par Apples - itchy throat - started to notice this spring season\par Pineapple - itchy throat\par Strawberry - itchy throat\par \par Food allergy: No suspicion for food allergy.  Tolerates milk, eggs, wheat, soy, peanut, tree nut, fish.\par Used to eat shellfish with no issues but stopped due to anxiety. \par \par Hair dye allergy - used to dye her stef with no itcy but recently started getting itchy red skin with some oozing 1-2 days after dying her hair. Uses bottle hair dye - christiano, clairol and some plant-based hair dye - same reaction for all.\par \par Has been having chest tightness and taking more albuterol - requires at least 2 puffs every day. This has been her pattern for the last week. \par Taking Advair 250 1  puff BID - increased from 1 puff daily to 1 puff BID a week ago.\par \par Hx of HTN - takes medication for this - amlodipine and valsartan.\par \par

## 2023-06-07 ENCOUNTER — APPOINTMENT (OUTPATIENT)
Dept: PEDIATRIC ALLERGY IMMUNOLOGY | Facility: CLINIC | Age: 53
End: 2023-06-07

## 2023-06-09 ENCOUNTER — APPOINTMENT (OUTPATIENT)
Dept: PEDIATRIC ALLERGY IMMUNOLOGY | Facility: CLINIC | Age: 53
End: 2023-06-09

## 2023-06-12 ENCOUNTER — APPOINTMENT (OUTPATIENT)
Dept: PEDIATRIC ALLERGY IMMUNOLOGY | Facility: CLINIC | Age: 53
End: 2023-06-12
Payer: MEDICAID

## 2023-06-12 VITALS
DIASTOLIC BLOOD PRESSURE: 81 MMHG | WEIGHT: 170 LBS | SYSTOLIC BLOOD PRESSURE: 126 MMHG | BODY MASS INDEX: 31.28 KG/M2 | HEIGHT: 62 IN | HEART RATE: 87 BPM | OXYGEN SATURATION: 98 %

## 2023-06-12 PROCEDURE — 99213 OFFICE O/P EST LOW 20 MIN: CPT | Mod: 25

## 2023-06-12 PROCEDURE — 95044 PATCH/APPLICATION TESTS: CPT

## 2023-06-12 RX ORDER — DIPHENHYDRAMINE HCL 25 MG/1
25 TABLET ORAL EVERY 6 HOURS
Qty: 1 | Refills: 1 | Status: ACTIVE | COMMUNITY
Start: 2023-05-24 | End: 1900-01-01

## 2023-06-12 NOTE — REASON FOR VISIT
[Routine Follow-Up] : a routine follow-up visit for [FreeTextEntry2] : evaluation of contact dermatitis

## 2023-06-12 NOTE — PHYSICAL EXAM
[Alert] : alert [Well Nourished] : well nourished [Healthy Appearance] : healthy appearance [No Acute Distress] : no acute distress [Well Developed] : well developed [Normal Pupil & Iris Size/Symmetry] : normal pupil and iris size and symmetry [No Discharge] : no discharge [No Photophobia] : no photophobia [Sclera Not Icteric] : sclera not icteric [Conjunctival Erythema] : no conjunctival erythema [Normal Lips/Tongue] : the lips and tongue were normal [Normal Outer Ear/Nose] : the ears and nose were normal in appearance [No Nasal Discharge] : no nasal discharge [Supple] : the neck was supple [Normal Rate and Effort] : normal respiratory rhythm and effort [No Crackles] : no crackles [No Retractions] : no retractions [Bilateral Audible Breath Sounds] : bilateral audible breath sounds [Wheezing] : no wheezing was heard [Normal Rate] : heart rate was normal  [Normal S1, S2] : normal S1 and S2 [No murmur] : no murmur [Regular Rhythm] : with a regular rhythm [Normal Cervical Lymph Nodes] : cervical [Skin Intact] : skin intact  [No Rash] : no rash [No Skin Lesions] : no skin lesions [No Cyanosis] : no cyanosis [Normal Mood] : mood was normal [Normal Affect] : affect was normal [Alert, Awake, Oriented as Age-Appropriate] : alert, awake, oriented as age appropriate

## 2023-06-12 NOTE — HISTORY OF PRESENT ILLNESS
[de-identified] : 52 year old woman with allergic rhinitis, oral allergy syndrome and contact dermatitis to hair dye, followed by Dr. Guerra, presents today for further evaluation of allergic contact dermatitis/patch testing.\par \par No acute concerns, at baseline health today.\par History:\par Hair dye allergy - used to dye her hair with no itchy but recently started getting itchy red skin with some oozing 1-2 days after dying her hair. Uses bottle hair dye - Aris, Clairol and some plant-based hair dye - same reaction for all.\par \par \par

## 2023-06-13 ENCOUNTER — RX CHANGE (OUTPATIENT)
Age: 53
End: 2023-06-13

## 2023-06-13 RX ORDER — FLUTICASONE PROPIONATE 50 UG/1
50 SPRAY, METERED NASAL
Qty: 48 | Refills: 1 | Status: ACTIVE | COMMUNITY
Start: 2023-06-13 | End: 1900-01-01

## 2023-06-13 RX ORDER — FLUTICASONE PROPIONATE 50 UG/1
50 SPRAY, METERED NASAL DAILY
Qty: 16 | Refills: 1 | Status: DISCONTINUED | COMMUNITY
Start: 2023-05-15 | End: 2023-06-13

## 2023-06-14 ENCOUNTER — APPOINTMENT (OUTPATIENT)
Dept: PEDIATRIC ALLERGY IMMUNOLOGY | Facility: CLINIC | Age: 53
End: 2023-06-14
Payer: MEDICAID

## 2023-06-14 PROCEDURE — 99212 OFFICE O/P EST SF 10 MIN: CPT

## 2023-06-14 NOTE — IMPRESSION
[FreeTextEntry2] : 48 hour patch test read: \par + fragrance mix, toñito mix\par ++PPD (p-phenyldiamine)

## 2023-06-14 NOTE — PHYSICAL EXAM
[Alert] : alert [Well Nourished] : well nourished [Healthy Appearance] : healthy appearance [No Acute Distress] : no acute distress [Well Developed] : well developed [Normal Pupil & Iris Size/Symmetry] : normal pupil and iris size and symmetry [No Discharge] : no discharge [No Photophobia] : no photophobia [Sclera Not Icteric] : sclera not icteric [Normal Lips/Tongue] : the lips and tongue were normal [Normal Outer Ear/Nose] : the ears and nose were normal in appearance [No Nasal Discharge] : no nasal discharge [Supple] : the neck was supple [Normal Rate and Effort] : normal respiratory rhythm and effort [No Crackles] : no crackles [No Retractions] : no retractions [Bilateral Audible Breath Sounds] : bilateral audible breath sounds [Normal Rate] : heart rate was normal  [Normal S1, S2] : normal S1 and S2 [No murmur] : no murmur [Regular Rhythm] : with a regular rhythm [Normal Cervical Lymph Nodes] : cervical [Skin Intact] : skin intact  [No Rash] : no rash [No Skin Lesions] : no skin lesions [No Cyanosis] : no cyanosis [Normal Mood] : mood was normal [Normal Affect] : affect was normal [Alert, Awake, Oriented as Age-Appropriate] : alert, awake, oriented as age appropriate [Conjunctival Erythema] : no conjunctival erythema [Wheezing] : no wheezing was heard

## 2023-06-14 NOTE — HISTORY OF PRESENT ILLNESS
[de-identified] : 52 year old woman with allergic rhinitis, oral allergy syndrome and contact dermatitis to hair dye, followed by Dr. Guerra, presents today for further evaluation of allergic contact dermatitis/patch testing.\par \par Pt presents for patch removal and 48 hour reading. Notes some itching.\par \par 6/12/23:\par No acute concerns, at baseline health today.\par History:\par Hair dye allergy - used to dye her hair with no itchy but recently started getting itchy red skin with some oozing 1-2 days after dying her hair. Uses bottle hair dye - Aris, Clairol and some plant-based hair dye - same reaction for all.\par \par \par

## 2023-06-16 ENCOUNTER — APPOINTMENT (OUTPATIENT)
Dept: PEDIATRIC ALLERGY IMMUNOLOGY | Facility: CLINIC | Age: 53
End: 2023-06-16
Payer: MEDICAID

## 2023-06-16 VITALS
WEIGHT: 172 LBS | TEMPERATURE: 97.4 F | OXYGEN SATURATION: 98 % | HEIGHT: 62 IN | BODY MASS INDEX: 31.65 KG/M2 | HEART RATE: 77 BPM | SYSTOLIC BLOOD PRESSURE: 116 MMHG | DIASTOLIC BLOOD PRESSURE: 75 MMHG

## 2023-06-16 DIAGNOSIS — L23.9 ALLERGIC CONTACT DERMATITIS, UNSPECIFIED CAUSE: ICD-10-CM

## 2023-06-16 PROCEDURE — 99212 OFFICE O/P EST SF 10 MIN: CPT

## 2023-06-16 RX ORDER — HYDROCORTISONE 10 MG/G
1 CREAM TOPICAL
Qty: 1 | Refills: 2 | Status: ACTIVE | COMMUNITY
Start: 2023-06-16 | End: 1900-01-01

## 2023-06-16 NOTE — IMPRESSION
[FreeTextEntry2] : 48 hour patch test read: \par + fragrance mix, toñito mix\par ++PPD (p-phenyldiamine)\par \par 96 hour patch test read: \par + fragrance mix, toñito mix\par ++PPD (p-phenyldiamine)

## 2023-06-16 NOTE — HISTORY OF PRESENT ILLNESS
[de-identified] : 52 year old woman with allergic rhinitis, oral allergy syndrome and contact dermatitis to hair dye, followed by Dr. Guerra, presents today for further evaluation of allergic contact dermatitis/patch testing.\par \par Pt presents for patch removal today. Still some itching at the site of PPD but not worse symptoms.\par Tried to add avocado to her prior blood work but too late.\par \par 6/14:\par Pt presents for patch removal and 48 hour reading. Notes some itching.\par \par 6/12/23:\par No acute concerns, at baseline health today.\par History:\par Hair dye allergy - used to dye her hair with no itchy but recently started getting itchy red skin with some oozing 1-2 days after dying her hair. Uses bottle hair dye - Aris, Clairol and some plant-based hair dye - same reaction for all.\par \par \par

## 2023-06-20 ENCOUNTER — TRANSCRIPTION ENCOUNTER (OUTPATIENT)
Age: 53
End: 2023-06-20

## 2023-06-29 ENCOUNTER — APPOINTMENT (OUTPATIENT)
Dept: GASTROENTEROLOGY | Facility: CLINIC | Age: 53
End: 2023-06-29

## 2023-07-12 LAB
ALBUMIN SERPL ELPH-MCNC: 4.1 G/DL
ALP BLD-CCNC: 91 U/L
ALT SERPL-CCNC: 24 U/L
ANION GAP SERPL CALC-SCNC: 10 MMOL/L
AST SERPL-CCNC: 17 U/L
BILIRUB SERPL-MCNC: 0.2 MG/DL
BUN SERPL-MCNC: 14 MG/DL
CALCIUM SERPL-MCNC: 9.1 MG/DL
CHLORIDE SERPL-SCNC: 108 MMOL/L
CHOLEST SERPL-MCNC: 123 MG/DL
CO2 SERPL-SCNC: 24 MMOL/L
CREAT SERPL-MCNC: 0.71 MG/DL
EGFR: 104 ML/MIN/1.73M2
GLUCOSE SERPL-MCNC: 90 MG/DL
HDLC SERPL-MCNC: 53 MG/DL
LDLC SERPL CALC-MCNC: 58 MG/DL
NONHDLC SERPL-MCNC: 70 MG/DL
POTASSIUM SERPL-SCNC: 4.6 MMOL/L
PROT SERPL-MCNC: 7.4 G/DL
SODIUM SERPL-SCNC: 142 MMOL/L
TRIGL SERPL-MCNC: 60 MG/DL

## 2023-07-30 ENCOUNTER — RX CHANGE (OUTPATIENT)
Age: 53
End: 2023-07-30

## 2023-07-30 RX ORDER — AZELASTINE HYDROCHLORIDE 137 UG/1
137 SPRAY, METERED NASAL
Qty: 1 | Refills: 2 | Status: ACTIVE | COMMUNITY
Start: 1900-01-01 | End: 1900-01-01

## 2023-07-30 RX ORDER — AZELASTINE HYDROCHLORIDE 137 UG/1
137 SPRAY, METERED NASAL TWICE DAILY
Qty: 1 | Refills: 3 | Status: DISCONTINUED | COMMUNITY
Start: 2023-05-15 | End: 2023-07-30

## 2023-08-10 ENCOUNTER — APPOINTMENT (OUTPATIENT)
Dept: PULMONOLOGY | Facility: CLINIC | Age: 53
End: 2023-08-10
Payer: MEDICAID

## 2023-08-10 ENCOUNTER — APPOINTMENT (OUTPATIENT)
Dept: RADIOLOGY | Facility: IMAGING CENTER | Age: 53
End: 2023-08-10
Payer: MEDICAID

## 2023-08-10 ENCOUNTER — OUTPATIENT (OUTPATIENT)
Dept: OUTPATIENT SERVICES | Facility: HOSPITAL | Age: 53
LOS: 1 days | End: 2023-08-10
Payer: MEDICAID

## 2023-08-10 VITALS
TEMPERATURE: 97.5 F | BODY MASS INDEX: 31.64 KG/M2 | HEIGHT: 60.83 IN | WEIGHT: 167.6 LBS | OXYGEN SATURATION: 99 % | SYSTOLIC BLOOD PRESSURE: 129 MMHG | HEART RATE: 86 BPM | DIASTOLIC BLOOD PRESSURE: 85 MMHG

## 2023-08-10 DIAGNOSIS — M25.572 PAIN IN LEFT ANKLE AND JOINTS OF LEFT FOOT: ICD-10-CM

## 2023-08-10 DIAGNOSIS — M79.672 PAIN IN LEFT FOOT: ICD-10-CM

## 2023-08-10 DIAGNOSIS — M25.571 PAIN IN RIGHT ANKLE AND JOINTS OF RIGHT FOOT: ICD-10-CM

## 2023-08-10 DIAGNOSIS — M79.671 PAIN IN RIGHT FOOT: ICD-10-CM

## 2023-08-10 PROCEDURE — 94010 BREATHING CAPACITY TEST: CPT

## 2023-08-10 PROCEDURE — 73600 X-RAY EXAM OF ANKLE: CPT

## 2023-08-10 PROCEDURE — 94726 PLETHYSMOGRAPHY LUNG VOLUMES: CPT

## 2023-08-10 PROCEDURE — 73630 X-RAY EXAM OF FOOT: CPT | Mod: 26,LT,RT

## 2023-08-10 PROCEDURE — 94729 DIFFUSING CAPACITY: CPT

## 2023-08-10 PROCEDURE — 99214 OFFICE O/P EST MOD 30 MIN: CPT | Mod: 25

## 2023-08-10 PROCEDURE — 73600 X-RAY EXAM OF ANKLE: CPT | Mod: 26,LT,RT

## 2023-08-10 PROCEDURE — ZZZZZ: CPT

## 2023-08-10 PROCEDURE — 73630 X-RAY EXAM OF FOOT: CPT

## 2023-08-10 RX ORDER — BUDESONIDE AND FORMOTEROL FUMARATE DIHYDRATE 80; 4.5 UG/1; UG/1
80-4.5 AEROSOL RESPIRATORY (INHALATION)
Qty: 1 | Refills: 2 | Status: ACTIVE | COMMUNITY
Start: 2023-08-10 | End: 1900-01-01

## 2023-08-10 NOTE — DISCUSSION/SUMMARY
[FreeTextEntry1] : ---Assessment plan----------The patient has been referred here for further opinion regarding pulmonary problem,  52-year-old female history of bronchial mild intermitent asthma  1 Intermittent  astham -----Will switch to Symbicrot 2 puffs as needed for SOB (SMART therapy) as her symptoms are mor intermitent 2 Labs reviewed, no eosinophilia or elevated IgE-- is following with Allergy Immunology and was noted to have tree pollen. hair dye and avacado allergy 3  CT CHEST reviewed, resolved nodule--Found to have abnormal lymph node on breast ultrasound s/p core needle biopsy of R breast revealling Reactive lymph node with follicular hyperplasia and paracortical hyperplasia. 4 Advised to use albuterol 30 minutes prior to exercise  5 Home sleep study-- has not done it yet but will do prior to next visit 6 Headache and dizziness MR Head 3/2023- with Colloid cyst, Pineal cyst, Arachnoid granulation stable from previous exams, following with Neurology, ENT and will be seeing her Neurologist who sees her for her headaches soon--will referr to new Neuro as she is requesting 7 F/u Rheum for Hashimoto thyroidistis, not on synthroid with TSH wnl 3/2023--will referred to Endo for thyroid biopsy 8 Influenza vaccine   UTD   RTC Feb 2024  Thanks for allowing  me to participate  in the care of this patient.  Patient at this time  will follow  the above mentioned recommendations and return back for follow up visit. If you have any questions  I can be reached  at # [phone] (office).  David Reyes MD, [facility]

## 2023-08-10 NOTE — HISTORY OF PRESENT ILLNESS
[Never] : never [TextBox_4] : This letter  is regarding your patient  who  attended pulmonary out patient office today.  I have reviewed  patient's  past history, social history, family history and medication list. I also  reviewed nurse practitioners/ and fellows  notes and assessment and agree with it.   The patient was referred by -------  Non-smoker----allergic to hairDYE------ ------No history of , fever, chills , rigors, chest pain, or hemoptysis. Questionable history of Raynaud's phenomenon. No h/o significant weight loss in last few months. No history of liver dysfunction , collagen vascular disorder or chronic thromboembolic disease. I would classify the patient's dyspnea as WHO  FUNCTIONAL CLASS II--------  ----Echo  date------ 2/2022: Normal pulmonary pressures, preserved LV function ----Pft date--------- 8/10/23 Mild restriction unchanged from 4/2022 ----Ct scan date------- 9/15/22 Upper lobe nodule resolved  9/2022:----Asthma much better controlled-----on Advair------- albuterol as needed-------- reports feeling well. She states that sometimes she feels chest tightness with exertion and with deep inspiration. She states this feeling is alleviated with albuterol. She denies nocturnal symptoms. Ct scan reviewed, nodule resolved. Has been compliant with advair.   2/2023- Asthma much better controlled--on [id number]  ---states she does not use Albuterol as often---reports he is well----she states she has chest tightness sometimes following up with cardiology----needs sleep study and repeat PFTS ---f/u allergy and rheumatology[ positive BRITTON]   8/2023- She states she uses her rescue inhaler 1x a month. She denies nocturnal awakenings. She states that during allergy season that she needs to use it more. She is using her Advair 1x a day. Her allergy testing revealed allergies to tree pollens, strawberries. She had Right breast core biopsy which revealed reactive lymph node. She is reporting daily headaches for years but more recently she has been having dizziness. She has seen neurology for her colloid cyst and ENT 12/2022 and was deemed to have vestibular migraines. She will be making Dr. Gomez her Neurologist that deals with her headaches.

## 2023-08-10 NOTE — REVIEW OF SYSTEMS
[Headache] : no headache [Fever] : no fever [Dry Eyes] : no dry eyes [Cough] : no cough [Chest Discomfort] : no chest discomfort [Hay Fever] : no hay fever [GERD] : no gerd [Nocturia] : no nocturia [Arthralgias] : no arthralgias [Raynaud] : no raynaud [Anemia] : no anemia [Depression] : no depression [Diabetes] : no diabetes

## 2023-08-10 NOTE — END OF VISIT
[] : Fellow [FreeTextEntry3] :   I, Dr. David Reyes  personally performed the evaluation and management (E/M) services for this established patient who presents today with (a) new problem(s)/exacerbation of (an) existing condition(s). That E/M includes conducting the clinically appropriate interval history &/or exam, assessing all new/exacerbated conditions, and establishing a new plan of care. Today, my ERNA, Batool Singh NP, , was here to observe my evaluation and management service for this new problem/exacerbated condition and follow the plan of care established by me going forward.  i discussed the management with [facility] fellow as well. [Time Spent: ___ minutes] : I have spent [unfilled] minutes of time on the encounter.

## 2023-09-07 ENCOUNTER — RX RENEWAL (OUTPATIENT)
Age: 53
End: 2023-09-07

## 2023-09-25 ENCOUNTER — APPOINTMENT (OUTPATIENT)
Dept: SLEEP CENTER | Facility: CLINIC | Age: 53
End: 2023-09-25
Payer: MEDICAID

## 2023-09-25 PROCEDURE — ZZZZZ: CPT

## 2023-09-26 ENCOUNTER — OUTPATIENT (OUTPATIENT)
Dept: OUTPATIENT SERVICES | Facility: HOSPITAL | Age: 53
LOS: 1 days | End: 2023-09-26
Payer: MEDICAID

## 2023-09-26 ENCOUNTER — APPOINTMENT (OUTPATIENT)
Dept: SLEEP CENTER | Facility: CLINIC | Age: 53
End: 2023-09-26
Payer: MEDICAID

## 2023-09-26 PROCEDURE — 95800 SLP STDY UNATTENDED: CPT

## 2023-09-26 PROCEDURE — 95800 SLP STDY UNATTENDED: CPT | Mod: 26

## 2023-10-03 DIAGNOSIS — G47.33 OBSTRUCTIVE SLEEP APNEA (ADULT) (PEDIATRIC): ICD-10-CM

## 2023-10-19 ENCOUNTER — APPOINTMENT (OUTPATIENT)
Dept: PAIN MANAGEMENT | Facility: CLINIC | Age: 53
End: 2023-10-19

## 2023-11-20 ENCOUNTER — NON-APPOINTMENT (OUTPATIENT)
Age: 53
End: 2023-11-20

## 2023-11-20 ENCOUNTER — APPOINTMENT (OUTPATIENT)
Dept: CARDIOLOGY | Facility: CLINIC | Age: 53
End: 2023-11-20
Payer: MEDICAID

## 2023-11-20 VITALS
WEIGHT: 165 LBS | HEART RATE: 61 BPM | HEIGHT: 61 IN | BODY MASS INDEX: 31.15 KG/M2 | DIASTOLIC BLOOD PRESSURE: 81 MMHG | SYSTOLIC BLOOD PRESSURE: 125 MMHG | OXYGEN SATURATION: 100 %

## 2023-11-20 DIAGNOSIS — R07.9 CHEST PAIN, UNSPECIFIED: ICD-10-CM

## 2023-11-20 PROCEDURE — 93000 ELECTROCARDIOGRAM COMPLETE: CPT

## 2023-11-20 PROCEDURE — 99214 OFFICE O/P EST MOD 30 MIN: CPT | Mod: 25

## 2023-12-01 NOTE — ED CDU PROVIDER INITIAL DAY NOTE - CPE EDP [facility] NORM
normal... Mr. Paiz is a 50 y/o male patient from home, ambulates independently w/ PMH of lumbar disc herniation s/p spinal surgery in 2014, HTN and symptomatic bradycardia s/p PPM 3 weeks ago p/w rt sided back pain x1 day. patient reports that he got up from the couch yesterday and experienced a sudden sharp shooting pain starting from his back radiating down to his Rt leg. He denies any trauma, no recent heavy lifting. Denies urinary fecal incontinence, no sensory loss. He follows w/ Dr. Froy Dillardo -pain management as out-patient and gets spinal inj every 3-4 months to help w/ pain. He had a recent MR spine 1-2 months ago.     In ED, patient's VS were stable. He was given acetaminophen and Toradol w/ no significant improvement.     PE as above   SLR +- RT side     Labs reviewed- cbc, bmp,     A/P:  #Acute on Chronic Lower back pain   #Lumbar radiculopathy   #HTN  #s/p PPM   #H/o spinal surgery   #DVT ppx     Plan: Mr. Paiz is a 50 y/o male patient from home, ambulates independently w/ PMH of lumbar disc herniation s/p spinal surgery in 2014, HTN and symptomatic bradycardia s/p PPM 3 weeks ago p/w rt sided back pain x1 day. patient reports that he got up from the couch yesterday and experienced a sudden sharp shooting pain starting from his back radiating down to his Rt leg. He denies any trauma, no recent heavy lifting. Denies urinary fecal incontinence, no sensory loss. He follows w/ Dr. Froy Muñoz -pain management as out-patient and gets spinal inj every 3-4 months to help w/ pain. He had a recent MR spine 1-2 months ago.     In ED, patient's VS were stable. He was given acetaminophen and Toradol w/ no significant improvement.     PE as above   SLR +- RT side     Labs reviewed- cbc, bmp,     A/P:  #Acute on Chronic Lower back pain   #Lumbar radiculopathy   #HTN  #s/p PPM   #H/o spinal surgery   #DVT ppx     Plan:  -Patient's symptom sare most consistent w/ acute back pain 2/2 lumbar herniation/ radiculopathy.  - Would start pain medications. Toradol standing x1 day, tramadol prn, methocarbamol and gabapentin. pain management consulted   -PT and neuro-spine consult   -Resume anti-HTN agents   -Lovenox SC Mr. Paiz is a 50 y/o male patient from home, ambulates independently w/ PMH of lumbar disc herniation s/p spinal surgery in 2014, HTN and symptomatic bradycardia s/p PPM 3 weeks ago p/w rt sided back pain x1 day. patient reports that he got up from the couch yesterday and experienced a sudden sharp shooting pain starting from his back radiating down to his Rt leg. He denies any trauma, no recent heavy lifting. Denies urinary fecal incontinence, no sensory loss. He follows w/ Dr. Froy Muñoz -pain management as out-patient and gets spinal inj every 3-4 months to help w/ pain. He had a recent MR spine 1-2 months ago.     In ED, patient's VS were stable. He was given acetaminophen and Toradol w/ no significant improvement.     PE as above   SLR +- RT side     Labs reviewed- cbc, bmp,     A/P:  #Acute on Chronic Lower back pain   #Lumbar radiculopathy   #HTN  #s/p PPM   #H/o spinal surgery   #DVT ppx     Plan:  -Patient's symptom sare most consistent w/ acute back pain 2/2 lumbar herniation/ radiculopathy. No s/s of cord compression.   - Would start pain medications. Toradol standing x1 day, tramadol prn, methocarbamol and gabapentin. pain management consulted   -PT and neuro-spine consult   -Resume anti-HTN agents   -Lovenox SC

## 2024-01-18 ENCOUNTER — APPOINTMENT (OUTPATIENT)
Dept: PAIN MANAGEMENT | Facility: CLINIC | Age: 54
End: 2024-01-18

## 2024-02-12 ENCOUNTER — APPOINTMENT (OUTPATIENT)
Dept: RHEUMATOLOGY | Facility: CLINIC | Age: 54
End: 2024-02-12
Payer: MEDICAID

## 2024-02-12 VITALS
HEIGHT: 61 IN | WEIGHT: 170 LBS | SYSTOLIC BLOOD PRESSURE: 130 MMHG | DIASTOLIC BLOOD PRESSURE: 85 MMHG | HEART RATE: 69 BPM | OXYGEN SATURATION: 98 % | BODY MASS INDEX: 32.1 KG/M2

## 2024-02-12 DIAGNOSIS — M25.562 PAIN IN RIGHT KNEE: ICD-10-CM

## 2024-02-12 DIAGNOSIS — G89.29 PAIN IN RIGHT KNEE: ICD-10-CM

## 2024-02-12 DIAGNOSIS — R76.8 OTHER SPECIFIED ABNORMAL IMMUNOLOGICAL FINDINGS IN SERUM: ICD-10-CM

## 2024-02-12 DIAGNOSIS — R22.1 LOCALIZED SWELLING, MASS AND LUMP, NECK: ICD-10-CM

## 2024-02-12 DIAGNOSIS — M25.561 PAIN IN RIGHT KNEE: ICD-10-CM

## 2024-02-12 PROCEDURE — 99215 OFFICE O/P EST HI 40 MIN: CPT

## 2024-02-13 NOTE — DISCUSSION/SUMMARY
[FreeTextEntry1] : ---Assessment plan----------The patient has been referred here for further opinion regarding pulmonary problem,  52-year-old female history of bronchial mild intermitent asthma  1 Intermittent  astham -----Will switch to Symbicrot 2 puffs as needed for SOB (SMART therapy) as her symptoms are mor intermitent 2 Labs reviewed, no eosinophilia or elevated IgE-- is following with Allergy Immunology and was noted to have tree pollen. hair dye and avacado allergy 3  CT CHEST reviewed, resolved nodule--Found to have abnormal lymph node on breast ultrasound s/p core needle biopsy of R breast revealling Reactive lymph node with follicular hyperplasia and paracortical hyperplasia. 4 Advised to use albuterol 30 minutes prior to exercise  5 Home sleep study-- has not done it yet but will do prior to next visit 6 Headache and dizziness MR Head 3/2023- with Colloid cyst, Pineal cyst, Arachnoid granulation stable from previous exams, following with Neurology, ENT and will be seeing her Neurologist who sees her for her headaches soon--will referr to new Neuro as she is requesting 7 F/u Rheum for Hashimoto thyroidistis, not on synthroid with TSH wnl 3/2023--will referred to Endo for thyroid biopsy 8 Influenza vaccine   UTD   RTC Feb 2024  Thanks for allowing  me to participate  in the care of this patient.  Patient at this time  will follow  the above mentioned recommendations and return back for follow up visit. If you have any questions  I can be reached  at # [phone] (office).  David Reyes MD, [facility]

## 2024-02-13 NOTE — REVIEW OF SYSTEMS
[Fever] : no fever [Dry Eyes] : no dry eyes [Cough] : no cough [Chest Discomfort] : no chest discomfort [Hay Fever] : no hay fever [GERD] : no gerd [Nocturia] : no nocturia [Arthralgias] : no arthralgias [Raynaud] : no raynaud [Anemia] : no anemia [Headache] : headache [Dizziness] : dizziness [Depression] : no depression [Diabetes] : no diabetes [Thyroid Problem] : thyroid problem

## 2024-02-13 NOTE — HISTORY OF PRESENT ILLNESS
[Never] : never [TextBox_4] : This letter  is regarding your patient  who  attended pulmonary out patient office today.  I have reviewed  patient's  past history, social history, family history and medication list. I also  reviewed nurse practitioners/ and fellows  notes and assessment and agree with it.   The patient was referred by -------  Non-smoker----allergic to hairDYE------ ------No history of , fever, chills , rigors, chest pain, or hemoptysis. Questionable history of Raynaud's phenomenon. No h/o significant weight loss in last few months. No history of liver dysfunction , collagen vascular disorder or chronic thromboembolic disease. I would classify the patient's dyspnea as WHO  FUNCTIONAL CLASS II--------  ----Echo  date------ 2/2022: Normal pulmonary pressures, preserved LV function ----Pft date--------- 8/10/23 Mild restriction unchanged from 4/2022 ----Ct scan date------- 9/15/22 Upper lobe nodule resolved  9/2022:----Asthma much better controlled-----on Advair------- albuterol as needed-------- reports feeling well. She states that sometimes she feels chest tightness with exertion and with deep inspiration. She states this feeling is alleviated with albuterol. She denies nocturnal symptoms. Ct scan reviewed, nodule resolved. Has been compliant with advair.   2/2023- Asthma much better controlled--on Msbtsd120/50  ---states she does not use Albuterol as often---reports he is well----she states she has chest tightness sometimes following up with cardiology----needs sleep study and repeat PFTS ---f/u allergy and rheumatology[ positive BRITTON]   8/2023- She states she uses her rescue inhaler 1x a month. She denies nocturnal awakenings. She states that during allergy season that she needs to use it more. She is using her Advair 1x a day. Her allergy testing revealed allergies to tree pollens, strawberries. She had Right breast core biopsy which revealed reactive lymph node. She is reporting daily headaches for years but more recently she has been having dizziness. She has seen neurology for her colloid cyst and ENT 12/2022 and was deemed to have vestibular migraines. She will be making Dr. Gomez her Neurologist that deals with her headaches.

## 2024-02-15 ENCOUNTER — APPOINTMENT (OUTPATIENT)
Dept: PULMONOLOGY | Facility: CLINIC | Age: 54
End: 2024-02-15

## 2024-02-17 NOTE — ED PROVIDER NOTE - IV ALTEPLASE INCLUSION HIDDEN
"Sajan Hernandez"Abdelrahman was seen and treated in our emergency department on 2/17/2024.  She may return to work on 02/20/2024.       If you have any questions or concerns, please don't hesitate to call.      Bry Scott NP" "Sajan Hernandez"Abdelrahman was seen and treated in our emergency department on 2/17/2024.  She may return to work on 02/21/2024.       If you have any questions or concerns, please don't hesitate to call.      Bry Scott NP" clears show

## 2024-02-19 ENCOUNTER — LABORATORY RESULT (OUTPATIENT)
Age: 54
End: 2024-02-19

## 2024-02-19 LAB
ALBUMIN SERPL ELPH-MCNC: 4.1 G/DL
ALP BLD-CCNC: 93 U/L
ALT SERPL-CCNC: 39 U/L
ANION GAP SERPL CALC-SCNC: 8 MMOL/L
APPEARANCE: CLEAR
AST SERPL-CCNC: 33 U/L
BACTERIA: NEGATIVE /HPF
BASOPHILS # BLD AUTO: 0.01 K/UL
BASOPHILS NFR BLD AUTO: 0.2 %
BILIRUB SERPL-MCNC: <0.2 MG/DL
BILIRUBIN URINE: NEGATIVE
BLOOD URINE: NEGATIVE
BUN SERPL-MCNC: 10 MG/DL
CALCIUM OXALATE CRYSTALS: PRESENT
CALCIUM SERPL-MCNC: 9.1 MG/DL
CAST: 0 /LPF
CHLORIDE SERPL-SCNC: 106 MMOL/L
CO2 SERPL-SCNC: 25 MMOL/L
COLOR: YELLOW
CREAT SERPL-MCNC: 0.62 MG/DL
CREAT SPEC-SCNC: 171 MG/DL
CREAT/PROT UR: 0.1 RATIO
CRP SERPL-MCNC: <3 MG/L
EGFR: 108 ML/MIN/1.73M2
EOSINOPHIL # BLD AUTO: 0.22 K/UL
EOSINOPHIL NFR BLD AUTO: 4.4 %
EPITHELIAL CELLS: 2 /HPF
ERYTHROCYTE [SEDIMENTATION RATE] IN BLOOD BY WESTERGREN METHOD: 49 MM/HR
GLUCOSE QUALITATIVE U: NEGATIVE MG/DL
HCT VFR BLD CALC: 43.2 %
HGB BLD-MCNC: 12.8 G/DL
IMM GRANULOCYTES NFR BLD AUTO: 0.2 %
KETONES URINE: NEGATIVE MG/DL
LEUKOCYTE ESTERASE URINE: NEGATIVE
LYMPHOCYTES # BLD AUTO: 1.47 K/UL
LYMPHOCYTES NFR BLD AUTO: 29.7 %
MAN DIFF?: NORMAL
MCHC RBC-ENTMCNC: 23.4 PG
MCHC RBC-ENTMCNC: 29.6 GM/DL
MCV RBC AUTO: 79 FL
MICROSCOPIC-UA: NORMAL
MONOCYTES # BLD AUTO: 0.47 K/UL
MONOCYTES NFR BLD AUTO: 9.5 %
NEUTROPHILS # BLD AUTO: 2.77 K/UL
NEUTROPHILS NFR BLD AUTO: 56 %
NITRITE URINE: NEGATIVE
PH URINE: 5.5
PLATELET # BLD AUTO: 195 K/UL
POTASSIUM SERPL-SCNC: 4.5 MMOL/L
PROT SERPL-MCNC: 7.5 G/DL
PROT UR-MCNC: 11 MG/DL
PROTEIN URINE: NEGATIVE MG/DL
RBC # BLD: 5.47 M/UL
RBC # FLD: 14.7 %
RED BLOOD CELLS URINE: 2 /HPF
REVIEW: NORMAL
SODIUM SERPL-SCNC: 139 MMOL/L
SPECIFIC GRAVITY URINE: 1.02
T3 SERPL-MCNC: 124 NG/DL
TSH SERPL-ACNC: 1.6 UIU/ML
UROBILINOGEN URINE: 0.2 MG/DL
WBC # FLD AUTO: 4.95 K/UL
WHITE BLOOD CELLS URINE: 2 /HPF

## 2024-02-19 NOTE — PHYSICAL EXAM
[General Appearance - Alert] : alert [General Appearance - In No Acute Distress] : in no acute distress [Full Pulse] : the pedal pulses are present [Edema] : there was no peripheral edema [FreeTextEntry1] : paraspinal tenderness neck; no knee effusions [Impaired Insight] : insight and judgment were intact [Oriented To Time, Place, And Person] : oriented to person, place, and time [Affect] : the affect was normal

## 2024-02-19 NOTE — HISTORY OF PRESENT ILLNESS
[FreeTextEntry1] : Prior history  52F with HTN, asthma, anxiety, migraines, referred here for abnormal blood work evaluation (positive BRITTON 1:320 in 10/2021).   Dr. Jeanie Ramsey (PCP) Patient states she has history of anemia, was taking iron supplements months ago for unclear reason. Gets heavy periods in first few days. No other external sources of blood.  No joint pain or joint swelling. No joint stiffness.  No rash. No oral ulcer. No alopecia, but mild hair loss. No weight loss. No dry eyes or dry mouth.  No skin thickening or tightening. No Raynauds. No dysphagia.  no DVT or PE history in self or family. One pregnancy in past, one child, 8 years ago. Needed uterine polyp removal. Premenopausal, not planning further pregnancies.   Was having chest pain earlier this year, had endoscopy/colonoscopy- found to have H. pylori infection, s/p triple abx therapy in summer of 2021. Colonoscopy otherwise normal. Pt tested positive for COVID-19 in 3/2021, had mild symptoms at the time, not treated with monoclonal antibodies. Got 2 doses of vaccine, last in 4/2021, did not yet get booster shot.   Family Hx: No family history of autoimmune disease.   Interval history ____________ Patient present for a follow up visit today.  Patient reports worsening knee pain. She has hard with stairs.  Also experiencing mid back pain. Midback is worse when she stands for prolonged period of time.  She was previously referred to PT but never did PT for the neck. Neck pain is still present. pain radiated to the right shoulder.  no swelling in the joints  no rashes  no am stiffness. +bruising  no hair loss c/o swelling anterior neck

## 2024-02-19 NOTE — ASSESSMENT
[FreeTextEntry1] : 1. Labs as outlined below given previously positive BRITTON and thyroid antibodies.  2. x-ray of the knee for further evaluation  3. US thyroid to evaluate for goiter/masses.  4. chronic neck and back pain.  Previously recommended PT but patient did not attend.  Agrees to home exercises (spine conditioning, knees)  f/u after imaging

## 2024-02-20 LAB
B2 GLYCOPROT1 AB SER QL: NEGATIVE
CARDIOLIPIN AB SER IA-ACNC: POSITIVE
CENTROMERE IGG SER-ACNC: <0.2 AL
DSDNA AB SER-ACNC: <12 IU/ML
ENA RNP AB SER IA-ACNC: 0.5 AL
ENA SCL70 IGG SER IA-ACNC: <0.2 AL
ENA SM AB SER IA-ACNC: <0.2 AL
ENA SS-A AB SER IA-ACNC: 0.7 AL
ENA SS-B AB SER IA-ACNC: <0.2 AL

## 2024-02-21 LAB
ANA PAT FLD IF-IMP: ABNORMAL
ANA SER IF-ACNC: ABNORMAL

## 2024-02-23 LAB — RNA POLYMERASE III IGG: 15 UNITS

## 2024-02-24 ENCOUNTER — APPOINTMENT (OUTPATIENT)
Dept: ULTRASOUND IMAGING | Facility: IMAGING CENTER | Age: 54
End: 2024-02-24
Payer: MEDICAID

## 2024-02-24 ENCOUNTER — OUTPATIENT (OUTPATIENT)
Dept: OUTPATIENT SERVICES | Facility: HOSPITAL | Age: 54
LOS: 1 days | End: 2024-02-24
Payer: MEDICAID

## 2024-02-24 ENCOUNTER — APPOINTMENT (OUTPATIENT)
Dept: RADIOLOGY | Facility: IMAGING CENTER | Age: 54
End: 2024-02-24
Payer: MEDICAID

## 2024-02-24 DIAGNOSIS — M25.561 PAIN IN RIGHT KNEE: ICD-10-CM

## 2024-02-24 DIAGNOSIS — R22.1 LOCALIZED SWELLING, MASS AND LUMP, NECK: ICD-10-CM

## 2024-02-24 PROCEDURE — 73564 X-RAY EXAM KNEE 4 OR MORE: CPT

## 2024-02-24 PROCEDURE — 76536 US EXAM OF HEAD AND NECK: CPT | Mod: 26

## 2024-02-24 PROCEDURE — 73564 X-RAY EXAM KNEE 4 OR MORE: CPT | Mod: 26,LT,RT

## 2024-02-24 PROCEDURE — 76536 US EXAM OF HEAD AND NECK: CPT

## 2024-02-26 ENCOUNTER — APPOINTMENT (OUTPATIENT)
Dept: OBGYN | Facility: CLINIC | Age: 54
End: 2024-02-26
Payer: MEDICAID

## 2024-02-26 ENCOUNTER — LABORATORY RESULT (OUTPATIENT)
Age: 54
End: 2024-02-26

## 2024-02-26 VITALS
SYSTOLIC BLOOD PRESSURE: 128 MMHG | WEIGHT: 172 LBS | DIASTOLIC BLOOD PRESSURE: 80 MMHG | BODY MASS INDEX: 32.47 KG/M2 | HEIGHT: 61 IN

## 2024-02-26 DIAGNOSIS — F52.0 HYPOACTIVE SEXUAL DESIRE DISORDER: ICD-10-CM

## 2024-02-26 DIAGNOSIS — Z01.411 ENCOUNTER FOR GYNECOLOGICAL EXAMINATION (GENERAL) (ROUTINE) WITH ABNORMAL FINDINGS: ICD-10-CM

## 2024-02-26 PROCEDURE — 99396 PREV VISIT EST AGE 40-64: CPT

## 2024-02-26 NOTE — COUNSELING
[Nutrition/ Exercise/ Weight Management] : nutrition, exercise, weight management [FreeTextEntry2] : Perimenopause and menopause [Vitamins/Supplements] : vitamins/supplements

## 2024-02-26 NOTE — PHYSICAL EXAM
[Alert] : alert [Appropriately responsive] : appropriately responsive [No Acute Distress] : no acute distress [Non-tender] : non-tender [Soft] : soft [No HSM] : No HSM [Non-distended] : non-distended [No Lesions] : no lesions [No Mass] : no mass [Oriented x3] : oriented x3 [Examination Of The Breasts] : a normal appearance [No Masses] : no breast masses were palpable [Labia Majora] : normal [Labia Minora] : normal [Uterine Adnexae] : normal [Normal] : normal

## 2024-03-04 ENCOUNTER — NON-APPOINTMENT (OUTPATIENT)
Age: 54
End: 2024-03-04

## 2024-03-04 ENCOUNTER — APPOINTMENT (OUTPATIENT)
Dept: OPHTHALMOLOGY | Facility: CLINIC | Age: 54
End: 2024-03-04
Payer: MEDICAID

## 2024-03-04 LAB
C TRACH RRNA SPEC QL NAA+PROBE: NOT DETECTED
CYTOLOGY CVX/VAG DOC THIN PREP: NORMAL
HPV HIGH+LOW RISK DNA PNL CVX: NOT DETECTED
N GONORRHOEA RRNA SPEC QL NAA+PROBE: NOT DETECTED
SOURCE TP AMPLIFICATION: NORMAL

## 2024-03-04 PROCEDURE — 92134 CPTRZ OPH DX IMG PST SGM RTA: CPT

## 2024-03-04 PROCEDURE — 92014 COMPRE OPH EXAM EST PT 1/>: CPT | Mod: 25

## 2024-03-06 ENCOUNTER — APPOINTMENT (OUTPATIENT)
Dept: ENDOCRINOLOGY | Facility: CLINIC | Age: 54
End: 2024-03-06
Payer: MEDICAID

## 2024-03-06 VITALS
BODY MASS INDEX: 32.66 KG/M2 | TEMPERATURE: 98 F | SYSTOLIC BLOOD PRESSURE: 108 MMHG | OXYGEN SATURATION: 100 % | HEIGHT: 61 IN | WEIGHT: 173 LBS | DIASTOLIC BLOOD PRESSURE: 69 MMHG | HEART RATE: 76 BPM

## 2024-03-06 DIAGNOSIS — E04.2 NONTOXIC MULTINODULAR GOITER: ICD-10-CM

## 2024-03-06 DIAGNOSIS — E66.9 OBESITY, UNSPECIFIED: ICD-10-CM

## 2024-03-06 PROCEDURE — 99213 OFFICE O/P EST LOW 20 MIN: CPT

## 2024-03-14 RX ORDER — LORAZEPAM 0.5 MG/1
0.5 TABLET ORAL
Qty: 2 | Refills: 0 | Status: ACTIVE | COMMUNITY
Start: 2021-09-24 | End: 1900-01-01

## 2024-03-16 PROBLEM — E66.9 OBESITY (BMI 30.0-34.9): Status: ACTIVE | Noted: 2024-03-06

## 2024-03-16 PROBLEM — E04.2 MULTINODULAR THYROID: Status: ACTIVE | Noted: 2022-06-06

## 2024-03-16 NOTE — PHYSICAL EXAM
[Well Nourished] : well nourished [Alert] : alert [Healthy Appearance] : healthy appearance [No Acute Distress] : no acute distress [Well Developed] : well developed [Normal Voice/Communication] : normal voice communication [Normal Sclera/Conjunctiva] : normal sclera/conjunctiva [No Proptosis] : no proptosis [No LAD] : no lymphadenopathy [Supple] : the neck was supple [Normal Insight/Judgement] : insight and judgment were intact [Normal Affect] : the affect was normal [Normal Mood] : the mood was normal [Obese] : obese [de-identified] : Enlarged thyroid gland with bilateral palpable thyroid nodules

## 2024-03-16 NOTE — ASSESSMENT
[FreeTextEntry1] : This is a 53-year-old female with multinodular goiter, Hashimoto's thyroiditis, migraines, anxiety, depression, hypertension, asthma, vitamin D insufficiency, brain cyst, panic disorder/agoraphobia, here for follow-up. Per patient PCP noticed thyroid enlargement in February 2022.  She had neck ultrasound on February 7, 2022 which showed right lobe 6.4 x 2.1 x 2.5 cm, left lobe 5.7 x 2.4 x 2.3 cm, isthmus measuring 6 mm. Right lobe mostly solid isoechoic to hyperechoic nodules, largest measuring 2 x 1.7 x 1.5 cm. Left mostly solid isoechoic nodules largest measuring 1.7 x 2.1 x 2.1 cm.  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 Thyroid ultrasound from February 24th, 2024, showed enlarged thyroid gland with multiple thyroid nodules, not significantly changed.  Thyroglobulin antibodies found to be positive on 2 separate occasions.  Hashimoto's thyroiditis reviewed. Will avoid starting thyroid hormone replacement at this time due to history of severe anxiety. TSH from 2/19/2024 is 1.6.

## 2024-03-16 NOTE — HISTORY OF PRESENT ILLNESS
[FreeTextEntry1] : CC: Thyroid nodules This is a 53-year-old female with multinodular goiter, Hashimoto's thyroiditis, migraines, anxiety, depression, hypertension, asthma, vitamin D insufficiency, brain cyst, panic disorder/agoraphobia, here for follow-up. Per patient PCP noticed thyroid enlargement in February 2022. She had neck ultrasound on February 7, 2022, which showed right lobe 6.4 x 2.1 x 2.5 cm, left lobe 5.7 x 2.4 x 2.3 cm, isthmus measuring 6 mm. Right lobe mostly solid isoechoic to hyperechoic nodules, largest measuring 2 x 1.7 x 1.5 cm Left mostly solid isoechoic nodules largest measuring 1.7 x 2.1 x 2.1 cm.  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 Thyroglobulin antibodies found to be positive on 2 separate occasions. She is not currently on thyroid hormone replacement. Reports occasional difficulty swallowing. Also reports she has to take small bites of food. There is no history of radiation therapy to the head or neck. There is no family history of thyroid cancer.

## 2024-03-16 NOTE — ADDENDUM
[FreeTextEntry1] :  By signing my name below, I, Lawrence Carter, attest that this document has been prepared under the direction and in the presence of Dr. Rossi.  I, Eli Rossi MD, personally performed the services described in this documentation. All medical record entries made by the scribe were at my discretion and in my presence. I have reviewed the chart and discharge instructions (if applicable) and agree that the record reflects my personal permanence and is accurate and complete.

## 2024-03-16 NOTE — REVIEW OF SYSTEMS
[Fatigue] : fatigue [Depression] : depression [Anxiety] : anxiety [All other systems negative] : All other systems negative [Headaches] : headaches [Dizziness] : dizziness [Joint Pain] : joint pain [FreeTextEntry4] : occasional difficulty swallowing  [FreeTextEntry9] : body aches

## 2024-03-18 ENCOUNTER — OUTPATIENT (OUTPATIENT)
Dept: OUTPATIENT SERVICES | Facility: HOSPITAL | Age: 54
LOS: 1 days | End: 2024-03-18
Payer: MEDICAID

## 2024-03-18 ENCOUNTER — APPOINTMENT (OUTPATIENT)
Dept: MRI IMAGING | Facility: CLINIC | Age: 54
End: 2024-03-18
Payer: MEDICAID

## 2024-03-18 DIAGNOSIS — Q04.6 CONGENITAL CEREBRAL CYSTS: ICD-10-CM

## 2024-03-18 DIAGNOSIS — Z00.8 ENCOUNTER FOR OTHER GENERAL EXAMINATION: ICD-10-CM

## 2024-03-18 PROCEDURE — 70553 MRI BRAIN STEM W/O & W/DYE: CPT

## 2024-03-18 PROCEDURE — 70553 MRI BRAIN STEM W/O & W/DYE: CPT | Mod: 26

## 2024-03-18 PROCEDURE — A9585: CPT

## 2024-03-30 ENCOUNTER — EMERGENCY (EMERGENCY)
Facility: HOSPITAL | Age: 54
LOS: 1 days | Discharge: HOME | End: 2024-03-30
Attending: EMERGENCY MEDICINE | Admitting: EMERGENCY MEDICINE
Payer: MEDICAID

## 2024-03-30 VITALS
OXYGEN SATURATION: 99 % | DIASTOLIC BLOOD PRESSURE: 70 MMHG | HEART RATE: 62 BPM | SYSTOLIC BLOOD PRESSURE: 110 MMHG | RESPIRATION RATE: 17 BRPM | TEMPERATURE: 98 F

## 2024-03-30 VITALS
TEMPERATURE: 97 F | OXYGEN SATURATION: 99 % | RESPIRATION RATE: 16 BRPM | HEART RATE: 71 BPM | DIASTOLIC BLOOD PRESSURE: 92 MMHG | WEIGHT: 169.98 LBS | HEIGHT: 61 IN | SYSTOLIC BLOOD PRESSURE: 168 MMHG

## 2024-03-30 LAB
ALBUMIN SERPL ELPH-MCNC: 3.7 G/DL — SIGNIFICANT CHANGE UP (ref 3.3–5)
ALP SERPL-CCNC: 101 U/L — SIGNIFICANT CHANGE UP (ref 40–120)
ALT FLD-CCNC: 26 U/L — SIGNIFICANT CHANGE UP (ref 4–33)
ANION GAP SERPL CALC-SCNC: 10 MMOL/L — SIGNIFICANT CHANGE UP (ref 7–14)
AST SERPL-CCNC: 32 U/L — SIGNIFICANT CHANGE UP (ref 4–32)
BASOPHILS # BLD AUTO: 0.01 K/UL — SIGNIFICANT CHANGE UP (ref 0–0.2)
BASOPHILS NFR BLD AUTO: 0.1 % — SIGNIFICANT CHANGE UP (ref 0–2)
BILIRUB SERPL-MCNC: <0.2 MG/DL — SIGNIFICANT CHANGE UP (ref 0.2–1.2)
BUN SERPL-MCNC: 17 MG/DL — SIGNIFICANT CHANGE UP (ref 7–23)
CALCIUM SERPL-MCNC: 9.1 MG/DL — SIGNIFICANT CHANGE UP (ref 8.4–10.5)
CHLORIDE SERPL-SCNC: 106 MMOL/L — SIGNIFICANT CHANGE UP (ref 98–107)
CO2 SERPL-SCNC: 22 MMOL/L — SIGNIFICANT CHANGE UP (ref 22–31)
CREAT SERPL-MCNC: 0.55 MG/DL — SIGNIFICANT CHANGE UP (ref 0.5–1.3)
EGFR: 111 ML/MIN/1.73M2 — SIGNIFICANT CHANGE UP
EOSINOPHIL # BLD AUTO: 0.19 K/UL — SIGNIFICANT CHANGE UP (ref 0–0.5)
EOSINOPHIL NFR BLD AUTO: 2.8 % — SIGNIFICANT CHANGE UP (ref 0–6)
GLUCOSE SERPL-MCNC: 94 MG/DL — SIGNIFICANT CHANGE UP (ref 70–99)
HCT VFR BLD CALC: 39.9 % — SIGNIFICANT CHANGE UP (ref 34.5–45)
HGB BLD-MCNC: 12.2 G/DL — SIGNIFICANT CHANGE UP (ref 11.5–15.5)
IANC: 3.7 K/UL — SIGNIFICANT CHANGE UP (ref 1.8–7.4)
IMM GRANULOCYTES NFR BLD AUTO: 0.1 % — SIGNIFICANT CHANGE UP (ref 0–0.9)
LYMPHOCYTES # BLD AUTO: 2.18 K/UL — SIGNIFICANT CHANGE UP (ref 1–3.3)
LYMPHOCYTES # BLD AUTO: 31.8 % — SIGNIFICANT CHANGE UP (ref 13–44)
MCHC RBC-ENTMCNC: 23.6 PG — LOW (ref 27–34)
MCHC RBC-ENTMCNC: 30.6 GM/DL — LOW (ref 32–36)
MCV RBC AUTO: 77 FL — LOW (ref 80–100)
MONOCYTES # BLD AUTO: 0.77 K/UL — SIGNIFICANT CHANGE UP (ref 0–0.9)
MONOCYTES NFR BLD AUTO: 11.2 % — SIGNIFICANT CHANGE UP (ref 2–14)
NEUTROPHILS # BLD AUTO: 3.7 K/UL — SIGNIFICANT CHANGE UP (ref 1.8–7.4)
NEUTROPHILS NFR BLD AUTO: 54 % — SIGNIFICANT CHANGE UP (ref 43–77)
NRBC # BLD: 0 /100 WBCS — SIGNIFICANT CHANGE UP (ref 0–0)
NRBC # FLD: 0 K/UL — SIGNIFICANT CHANGE UP (ref 0–0)
PLATELET # BLD AUTO: 245 K/UL — SIGNIFICANT CHANGE UP (ref 150–400)
POTASSIUM SERPL-MCNC: 4.6 MMOL/L — SIGNIFICANT CHANGE UP (ref 3.5–5.3)
POTASSIUM SERPL-SCNC: 4.6 MMOL/L — SIGNIFICANT CHANGE UP (ref 3.5–5.3)
PROT SERPL-MCNC: 7.5 G/DL — SIGNIFICANT CHANGE UP (ref 6–8.3)
RBC # BLD: 5.18 M/UL — SIGNIFICANT CHANGE UP (ref 3.8–5.2)
RBC # FLD: 14.6 % — HIGH (ref 10.3–14.5)
SODIUM SERPL-SCNC: 138 MMOL/L — SIGNIFICANT CHANGE UP (ref 135–145)
TROPONIN T, HIGH SENSITIVITY RESULT: <6 NG/L — SIGNIFICANT CHANGE UP
WBC # BLD: 6.86 K/UL — SIGNIFICANT CHANGE UP (ref 3.8–10.5)
WBC # FLD AUTO: 6.86 K/UL — SIGNIFICANT CHANGE UP (ref 3.8–10.5)

## 2024-03-30 PROCEDURE — 99285 EMERGENCY DEPT VISIT HI MDM: CPT | Mod: 25

## 2024-03-30 PROCEDURE — 93010 ELECTROCARDIOGRAM REPORT: CPT

## 2024-03-30 PROCEDURE — 71046 X-RAY EXAM CHEST 2 VIEWS: CPT | Mod: 26

## 2024-03-30 RX ORDER — ASPIRIN/CALCIUM CARB/MAGNESIUM 324 MG
324 TABLET ORAL ONCE
Refills: 0 | Status: COMPLETED | OUTPATIENT
Start: 2024-03-30 | End: 2024-03-30

## 2024-03-30 RX ADMIN — Medication 324 MILLIGRAM(S): at 01:28

## 2024-03-30 NOTE — ED ADULT TRIAGE NOTE - CHIEF COMPLAINT QUOTE
c/o chest pain x 1 day radiating to left arm. denies SOB, nausea, dizziness, headache. past medical Hx HTN, asthma

## 2024-03-30 NOTE — ED PROVIDER NOTE - ATTENDING CONTRIBUTION TO CARE
Rx listed below. Preferred Pharmacy has been set up and verified.   52 y/o F with h/o HTN, asthma, anxiety here with chest pain.  Pt reports intermittent pain lasting 2 min at a time, unprovoked beginning around 5pm tonight to L upper chest wall, radiating to L upper arm.  Pain became constant around 10pm.  No associated fever, chills, cough, sob, back pain, abd pain, n/v, leg pain or swelling.  Pt took tylenol 30 min pta.  She reports similar pain in the past year - had a negative cardiac work-up including ekg, blood work, and stress test at the time.  No tob or hormone use.  No recent travel or illness.    Well appearing, lying comfortably in stretcher, awake and alert, nontoxic.  VSS.  Lungs cta bl.  Cards nl S1/S2, RRR, no MRG.  Abd soft ntnd.  No pedal edema or calf tenderness.    Consider ACS, although low mercedes and sxs are atypical.  Do not suspect pe or aortic catastrophe.  No signs of infection, no resp sxs to suggest ptx.  Plan for labs incl trop x1, cxr, ekg, reassess.  If work-up neg today, plan for close PMD follow-up.

## 2024-03-30 NOTE — ED PROVIDER NOTE - NSFOLLOWUPINSTRUCTIONS_ED_ALL_ED_FT
You were seen in the emergency department for chest pain.  You had an EKG, chest x-ray and blood work done, which did not reveal any concerning findings.      Drink plenty of fluids.  You can take ibuprofen 600mg every 6 hours or Tylenol 650mg every 4 hours as needed for pain or fever.  Follow-up with your PMD in 24-48 hours.  Return to the emergency department for any new or worsening symptoms.

## 2024-03-30 NOTE — ED ADULT NURSE NOTE - OBJECTIVE STATEMENT
Pt received to spot 26, A&Ox4. Pt endorsing left sided chest pain radiating up left shoulder x 1 day. Pt hx of asthma, HTN, anxiety. Pt states the pain is intermittent. Pt denies SOB, dizziness, headache, fevers, chills, N/V/D. Respirations even and unlabored. VS as noted in flowsheet. 20G Iv established to right ac, labs drawn and sent. Pt NSr on cardiac monitor. Safety maintained throughout. pending xray

## 2024-03-30 NOTE — ED PROVIDER NOTE - PHYSICAL EXAMINATION
GENERAL: Lying in bed in no acute distress  EYES: Conjunctiva noninjected or pale, sclera anicteric  HENT: NC/AT, moist mucous membranes  NECK: Supple, trachea midline  LUNG: Nonlabored respirations, no wheezes, rales  CV: RRR, Pulses- Radial: 2+ b/l  ABDOMEN: Nondistended, nontender  MSK: No visible deformities, nontender extremities  SKIN: No rashes, bruises  NEURO: AAOx4 (to person, place, time, event), no tremor  PSYCH: Normal mood and affect

## 2024-03-30 NOTE — ED PROVIDER NOTE - PATIENT PORTAL LINK FT
You can access the FollowMyHealth Patient Portal offered by [facility] by registering at the following website: [email or web address]. By joining [facility] FollowMyHealth portal, you will also be able to view your health information using other applications (apps) compatible with our system.

## 2024-03-30 NOTE — ED PROVIDER NOTE - OBJECTIVE STATEMENT
Ms. Zuluaga is a 52 YO woman with PMH of colloid cyst, HTN, HLD, asthma, anxiety presenting with 1 day history of chest pain. She was in her usual state of health until this afternoon, when she noted intermittent L sided chest pain radiating to the arm, described as pulsing, 7-8/10. She took some Tylenol with no relief and came to the ED when the pain started feeling constant. The pain goes up to the upper shoulder. She follows with a cardiologist, Dr. Thomas, who she last saw in Fall 2023, at that time was told some plaque may be forming and was started on 5 mg rosuvastatin. She has not used her albuterol or lorazepam for anxiety recently. She denies fever/chills/abdominal pain/shortness of breath/nausea/vomiting/change in bathroom habits.     PMH: colloid cyst, HTN, HLD, asthma, anxiety  PSH: vaginal polyp removal  Med: lorazepam, amlodipine, albuterol, valsartan, rosuvastatin

## 2024-03-30 NOTE — ED ADULT NURSE NOTE - NSFALLUNIVINTERV_ED_ALL_ED
Bed/Stretcher in lowest position, wheels locked, appropriate side rails in place/Call bell, personal items and telephone in reach/Instruct patient to call for assistance before getting out of bed/chair/stretcher/Non-slip footwear applied when patient is off stretcher/[place] to call system/Physically safe environment - no spills, clutter or unnecessary equipment/Purposeful proactive rounding/Room/bathroom lighting operational, light cord in reach

## 2024-03-30 NOTE — ED PROVIDER NOTE - CLINICAL SUMMARY MEDICAL DECISION MAKING FREE TEXT BOX
Ms. Zuluaga is a 54 YO woman with PMH of colloid cyst, HTN, HLD, asthma, anxiety presenting with 1 day history of chest pain. Will obtain blood work to rule out ischemic cause of chest pain, EKG reassuring. Will obtain CXR to rule out pulmonary cause.

## 2024-03-30 NOTE — ED ADULT NURSE REASSESSMENT NOTE - NS ED NURSE REASSESS COMMENT FT1
break coverage rn. received report from MAGDY lobato. pt A&Ox4, endorsing L sided chest discomfort. denies n/v, headache, dizziness. safety maintained. pt brought to xray

## 2024-04-03 ENCOUNTER — NON-APPOINTMENT (OUTPATIENT)
Age: 54
End: 2024-04-03

## 2024-04-05 ENCOUNTER — APPOINTMENT (OUTPATIENT)
Dept: NEUROSURGERY | Facility: CLINIC | Age: 54
End: 2024-04-05
Payer: MEDICAID

## 2024-04-05 DIAGNOSIS — Q04.6 CONGENITAL CEREBRAL CYSTS: ICD-10-CM

## 2024-04-05 PROCEDURE — 99442: CPT

## 2024-04-11 PROBLEM — Q04.6 COLLOID CYST OF BRAIN: Status: ACTIVE | Noted: 2019-07-02

## 2024-04-11 NOTE — ASSESSMENT
[FreeTextEntry1] : IMPRESSION:  53 year old female with a history of migraine headaches. Insurance did not cover Nurtec nor Ubrelvy however pain is managed with Tylenol PRN.  PMHX includes HTN, asthma, anxiety disorder, social anxiety, migraine headaches and claustrophobia.  Today she is participating in a phone visit to discuss results of MRI BRAIN 3/18/24.  Pt reports symptoms of occasional headaches and light headedness.  Reassured pt that symptoms are unlikely related to the colloid cyst,  MRI brain demonstrated a stable small colloid cyst roof of the third ventricle near the foramen of Monro without evidence of hydrocephalus.  PLAN: 1. F/U MRI brain in 3 years. 2. Referred pt to Dr. Ventura for headache management as she stated that Dr. Gomez is booked out very far with appointments.

## 2024-04-11 NOTE — HISTORY OF PRESENT ILLNESS
[FreeTextEntry1] : Te Zuluaga is a pleasant right handed 53 year old lady of  ([place]) descent who presented for consultation regarding a small colloid cyst that was found incidentally on MRI brain done during an ER visit at [facility] in 2019. At that time patient had recent titration of BP medications she had an episode of elevated blood pressure, nausea, dizziness, generalized pain, head pressure. She described pain as being sharp, 5-6/10 and more so in both temporal areas. Pain was controlled with Tylenol prn.  She is currently under the care of her PCP Dr. Teo Portillo,[address] [place] [id number], [phone]. She also has a history of migraine headaches. Insurance did not cover Nurtec nor Ubrelvy however pain is managed with Tylenol PRN.  PMHX includes HTN, asthma, anxiety disorder, social anxiety, migraine headaches and claustrophobia.  Today she is participating in a phone visit to discuss results of MRI BRAIN 3/18/24.  Pt reports symptoms of occasional headaches and light headedness.  Reassured pt that symptoms are unlikely related to the colloid cyst,

## 2024-05-03 ENCOUNTER — APPOINTMENT (OUTPATIENT)
Dept: PAIN MANAGEMENT | Facility: CLINIC | Age: 54
End: 2024-05-03

## 2024-05-06 ENCOUNTER — APPOINTMENT (OUTPATIENT)
Dept: ULTRASOUND IMAGING | Facility: IMAGING CENTER | Age: 54
End: 2024-05-06
Payer: MEDICAID

## 2024-05-06 ENCOUNTER — OUTPATIENT (OUTPATIENT)
Dept: OUTPATIENT SERVICES | Facility: HOSPITAL | Age: 54
LOS: 1 days | End: 2024-05-06
Payer: MEDICAID

## 2024-05-06 ENCOUNTER — RESULT REVIEW (OUTPATIENT)
Age: 54
End: 2024-05-06

## 2024-05-06 ENCOUNTER — APPOINTMENT (OUTPATIENT)
Dept: MAMMOGRAPHY | Facility: IMAGING CENTER | Age: 54
End: 2024-05-06
Payer: MEDICAID

## 2024-05-06 DIAGNOSIS — Z01.419 ENCOUNTER FOR GYNECOLOGICAL EXAMINATION (GENERAL) (ROUTINE) WITHOUT ABNORMAL FINDINGS: ICD-10-CM

## 2024-05-06 PROCEDURE — 77067 SCR MAMMO BI INCL CAD: CPT | Mod: 26

## 2024-05-06 PROCEDURE — 77067 SCR MAMMO BI INCL CAD: CPT

## 2024-05-06 PROCEDURE — 76641 ULTRASOUND BREAST COMPLETE: CPT | Mod: 26,50

## 2024-05-06 PROCEDURE — 77063 BREAST TOMOSYNTHESIS BI: CPT

## 2024-05-06 PROCEDURE — 77063 BREAST TOMOSYNTHESIS BI: CPT | Mod: 26

## 2024-05-06 PROCEDURE — 76641 ULTRASOUND BREAST COMPLETE: CPT

## 2024-05-28 NOTE — ED ADULT NURSE NOTE - PAIN: PRESENCE, MLM
[facility] CARE COORDINATION  [address]  [facility] [address]    May 28, 2024    Lima Bueno  [address] [facility] [address]      Dear Lima,    I am a clinic care coordinator who works with Oscar Baltazar MD, MD with the [facility]. I wanted to introduce myself and provide you with my contact information for you to be able to call me with any questions or concerns. Below is a description of clinic care coordination and how I can further assist you.       The clinic care coordination team is made up of a registered nurse, , financial resource worker and community health worker who understand the health care system. The goal of clinic care coordination is to help you manage your health and improve access to the health care system. Our team works alongside your provider to assist you in determining your health and social needs. We can help you obtain health care and community resources, providing you with necessary information and education. We can work with you through any barriers and develop a care plan that helps coordinate and strengthen the communication between you and your care team.  Our services are voluntary and are offered without charge to you personally.    Please feel free to contact me with any questions or concerns regarding care coordination and what we can offer.      We are focused on providing you with the highest-quality healthcare experience possible.    Sincerely,     Kinga Stallworth, [facility]  Clinic Care Coordinator  [facility]  [phone]          complains of pain/discomfort

## 2024-05-31 ENCOUNTER — RX RENEWAL (OUTPATIENT)
Age: 54
End: 2024-05-31

## 2024-05-31 RX ORDER — ROSUVASTATIN CALCIUM 5 MG/1
5 TABLET, FILM COATED ORAL
Qty: 30 | Refills: 2 | Status: ACTIVE | COMMUNITY
Start: 2023-04-17 | End: 1900-01-01

## 2024-06-20 ENCOUNTER — NON-APPOINTMENT (OUTPATIENT)
Age: 54
End: 2024-06-20

## 2024-07-03 ENCOUNTER — APPOINTMENT (OUTPATIENT)
Dept: CARDIOLOGY | Facility: CLINIC | Age: 54
End: 2024-07-03

## 2024-07-03 VITALS
SYSTOLIC BLOOD PRESSURE: 120 MMHG | WEIGHT: 180 LBS | OXYGEN SATURATION: 98 % | BODY MASS INDEX: 33.99 KG/M2 | DIASTOLIC BLOOD PRESSURE: 80 MMHG | HEIGHT: 61 IN | HEART RATE: 75 BPM

## 2024-07-03 DIAGNOSIS — I83.90 ASYMPTOMATIC VARICOSE VEINS OF UNSPECIFIED LOWER EXTREMITY: ICD-10-CM

## 2024-07-03 DIAGNOSIS — Z82.49 FAMILY HISTORY OF ISCHEMIC HEART DISEASE AND OTHER DISEASES OF THE CIRCULATORY SYSTEM: ICD-10-CM

## 2024-07-03 DIAGNOSIS — Z86.79 PERSONAL HISTORY OF OTHER DISEASES OF THE CIRCULATORY SYSTEM: ICD-10-CM

## 2024-07-03 PROCEDURE — 99213 OFFICE O/P EST LOW 20 MIN: CPT

## 2024-07-12 ENCOUNTER — APPOINTMENT (OUTPATIENT)
Dept: PAIN MANAGEMENT | Facility: CLINIC | Age: 54
End: 2024-07-12

## 2024-07-13 ENCOUNTER — APPOINTMENT (OUTPATIENT)
Dept: ULTRASOUND IMAGING | Facility: IMAGING CENTER | Age: 54
End: 2024-07-13

## 2024-07-13 ENCOUNTER — RESULT REVIEW (OUTPATIENT)
Age: 54
End: 2024-07-13

## 2024-07-13 ENCOUNTER — OUTPATIENT (OUTPATIENT)
Dept: OUTPATIENT SERVICES | Facility: HOSPITAL | Age: 54
LOS: 1 days | End: 2024-07-13
Payer: MEDICAID

## 2024-07-13 DIAGNOSIS — I83.90 ASYMPTOMATIC VARICOSE VEINS OF UNSPECIFIED LOWER EXTREMITY: ICD-10-CM

## 2024-07-13 PROCEDURE — 93970 EXTREMITY STUDY: CPT | Mod: 26

## 2024-07-13 PROCEDURE — 93970 EXTREMITY STUDY: CPT

## 2024-07-16 ENCOUNTER — EMERGENCY (EMERGENCY)
Facility: HOSPITAL | Age: 54
LOS: 1 days | Discharge: HOME | End: 2024-07-16
Admitting: EMERGENCY MEDICINE

## 2024-07-16 VITALS
HEIGHT: 61 IN | WEIGHT: 179.9 LBS | RESPIRATION RATE: 17 BRPM | HEART RATE: 94 BPM | SYSTOLIC BLOOD PRESSURE: 122 MMHG | DIASTOLIC BLOOD PRESSURE: 79 MMHG | TEMPERATURE: 98 F | OXYGEN SATURATION: 98 %

## 2024-07-16 PROCEDURE — 99284 EMERGENCY DEPT VISIT MOD MDM: CPT | Mod: 25

## 2024-07-16 PROCEDURE — 10061 I&D ABSCESS COMP/MULTIPLE: CPT

## 2024-07-17 ENCOUNTER — APPOINTMENT (OUTPATIENT)
Dept: CARDIOLOGY | Facility: CLINIC | Age: 54
End: 2024-07-17

## 2024-07-17 LAB
GRAM STN FLD: ABNORMAL
SPECIMEN SOURCE: SIGNIFICANT CHANGE UP

## 2024-07-17 RX ORDER — AMOXICILLIN/POTASSIUM CLAV 250-125 MG
1 TABLET ORAL ONCE
Refills: 0 | Status: COMPLETED | OUTPATIENT
Start: 2024-07-17 | End: 2024-07-17

## 2024-07-17 RX ORDER — ACETAMINOPHEN 325 MG
650 TABLET ORAL ONCE
Refills: 0 | Status: COMPLETED | OUTPATIENT
Start: 2024-07-17 | End: 2024-07-17

## 2024-07-17 RX ORDER — LIDOCAINE HYDROCHLORIDE 20 MG/ML
2 INJECTION, SOLUTION EPIDURAL; INFILTRATION; INTRACAUDAL; PERINEURAL ONCE
Refills: 0 | Status: ACTIVE | OUTPATIENT
Start: 2024-07-17 | End: 2024-07-17

## 2024-07-17 RX ORDER — OXYCODONE AND ACETAMINOPHEN 5; 325 MG/1; MG/1
1 TABLET ORAL
Qty: 4 | Refills: 0
Start: 2024-07-17 | End: 2024-07-17

## 2024-07-17 RX ORDER — AMOXICILLIN/POTASSIUM CLAV 250-125 MG
1 TABLET ORAL
Qty: 10 | Refills: 0
Start: 2024-07-17 | End: 2024-07-21

## 2024-07-17 RX ADMIN — Medication 650 MILLIGRAM(S): at 00:27

## 2024-07-17 RX ADMIN — Medication 1 TABLET(S): at 01:58

## 2024-07-19 ENCOUNTER — EMERGENCY (EMERGENCY)
Facility: HOSPITAL | Age: 54
LOS: 1 days | Discharge: HOME | End: 2024-07-19
Attending: EMERGENCY MEDICINE | Admitting: EMERGENCY MEDICINE
Payer: MEDICAID

## 2024-07-19 VITALS
HEIGHT: 61 IN | HEART RATE: 72 BPM | RESPIRATION RATE: 16 BRPM | DIASTOLIC BLOOD PRESSURE: 68 MMHG | WEIGHT: 179.9 LBS | TEMPERATURE: 98 F | OXYGEN SATURATION: 99 % | SYSTOLIC BLOOD PRESSURE: 109 MMHG

## 2024-07-19 LAB
CULTURE RESULTS: ABNORMAL
SPECIMEN SOURCE: SIGNIFICANT CHANGE UP

## 2024-07-19 PROCEDURE — 99283 EMERGENCY DEPT VISIT LOW MDM: CPT

## 2024-07-19 NOTE — ED PROVIDER NOTE - PATIENT PORTAL LINK FT
You can access the FollowMyHealth Patient Portal offered by [facility] by registering at the following website: [email or web address]. By joining [facility]’s FollowMyHealth portal, you will also be able to view your health information using other applications (apps) compatible with our system.

## 2024-07-19 NOTE — ED PROVIDER NOTE - CLINICAL SUMMARY MEDICAL DECISION MAKING FREE TEXT BOX
The wound is well appearing with no signs of infection or increased drainage; packing in place. the pt is continuing antibiotics and ready for discharge to follow with her pmd as an outpt.

## 2024-07-19 NOTE — ED PROVIDER NOTE - NSFOLLOWUPINSTRUCTIONS_ED_ALL_ED_FT
Advance activity as tolerated.  Continue all previously prescribed medications as directed unless otherwise instructed.  Follow up with your primary care physician in 48-72 hours- bring copies of your results.  Return to the ER for worsening or persistent symptoms, and/or ANY NEW OR CONCERNING SYMPTOMS. If you have issues obtaining follow up, please call: [phone] to obtain a doctor or specialist who takes your insurance in your area.  You may call [phone] to make an appointment with the internal medicine clinic.

## 2024-07-19 NOTE — ED PROVIDER NOTE - SKIN, MLM
Skin normal color for race, warm, wound in the right pubic region is well appearing with no signs of infection or increased drainage; packing in place

## 2024-07-19 NOTE — ED ADULT TRIAGE NOTE - CHIEF COMPLAINT QUOTE
Pt returning for wound care check, reports had abdominal abscess drained about two days ago. pmhx: htn, asthma

## 2024-07-19 NOTE — ED PROVIDER NOTE - OBJECTIVE STATEMENT
54 yo F with H of colloid cyst, HTN, HLD, asthma, anxiety seen in [facility] two days ago for an I&D of an abscess in the right pubic region. The pt is here for a wound check. Denies f/c, any increased pain or drainage.

## 2024-07-19 NOTE — ED ADULT TRIAGE NOTE - ARRIVAL FROM
Restart warfarin at 5 mg a day.    Set up an INR visit late next week in 6-7 days.    Routine follow-up with me in 6 months, if no other issues.   Home

## 2024-07-23 ENCOUNTER — NON-APPOINTMENT (OUTPATIENT)
Age: 54
End: 2024-07-23

## 2024-09-05 ENCOUNTER — NON-APPOINTMENT (OUTPATIENT)
Age: 54
End: 2024-09-05

## 2024-09-05 ENCOUNTER — APPOINTMENT (OUTPATIENT)
Dept: OPHTHALMOLOGY | Facility: CLINIC | Age: 54
End: 2024-09-05
Payer: MEDICAID

## 2024-09-05 PROCEDURE — 76514 ECHO EXAM OF EYE THICKNESS: CPT

## 2024-09-05 PROCEDURE — 92002 INTRM OPH EXAM NEW PATIENT: CPT | Mod: 25

## 2024-09-24 ENCOUNTER — RX RENEWAL (OUTPATIENT)
Age: 54
End: 2024-09-24

## 2024-09-24 RX ORDER — DIPHENHYDRAMINE HYDROCHLORIDE 25 MG/1
25 CAPSULE ORAL
Qty: 50 | Refills: 1 | Status: ACTIVE | COMMUNITY
Start: 2024-09-24 | End: 1900-01-01

## 2024-10-09 ENCOUNTER — EMERGENCY (EMERGENCY)
Facility: HOSPITAL | Age: 54
LOS: 1 days | Discharge: HOME | End: 2024-10-09
Attending: STUDENT IN AN ORGANIZED HEALTH CARE EDUCATION/TRAINING PROGRAM | Admitting: STUDENT IN AN ORGANIZED HEALTH CARE EDUCATION/TRAINING PROGRAM
Payer: MEDICAID

## 2024-10-09 VITALS
HEART RATE: 60 BPM | OXYGEN SATURATION: 100 % | TEMPERATURE: 97 F | SYSTOLIC BLOOD PRESSURE: 124 MMHG | RESPIRATION RATE: 20 BRPM | DIASTOLIC BLOOD PRESSURE: 76 MMHG

## 2024-10-09 VITALS
SYSTOLIC BLOOD PRESSURE: 147 MMHG | OXYGEN SATURATION: 99 % | WEIGHT: 179.9 LBS | TEMPERATURE: 98 F | HEART RATE: 67 BPM | RESPIRATION RATE: 16 BRPM | DIASTOLIC BLOOD PRESSURE: 74 MMHG

## 2024-10-09 LAB
ALBUMIN SERPL ELPH-MCNC: 3.8 G/DL — SIGNIFICANT CHANGE UP (ref 3.3–5)
ALP SERPL-CCNC: 97 U/L — SIGNIFICANT CHANGE UP (ref 40–120)
ALT FLD-CCNC: 27 U/L — SIGNIFICANT CHANGE UP (ref 4–33)
ANION GAP SERPL CALC-SCNC: 9 MMOL/L — SIGNIFICANT CHANGE UP (ref 7–14)
AST SERPL-CCNC: 24 U/L — SIGNIFICANT CHANGE UP (ref 4–32)
BILIRUB SERPL-MCNC: <0.2 MG/DL — SIGNIFICANT CHANGE UP (ref 0.2–1.2)
BUN SERPL-MCNC: 13 MG/DL — SIGNIFICANT CHANGE UP (ref 7–23)
CALCIUM SERPL-MCNC: 9.6 MG/DL — SIGNIFICANT CHANGE UP (ref 8.4–10.5)
CHLORIDE SERPL-SCNC: 105 MMOL/L — SIGNIFICANT CHANGE UP (ref 98–107)
CO2 SERPL-SCNC: 24 MMOL/L — SIGNIFICANT CHANGE UP (ref 22–31)
CREAT SERPL-MCNC: 0.59 MG/DL — SIGNIFICANT CHANGE UP (ref 0.5–1.3)
EGFR: 109 ML/MIN/1.73M2 — SIGNIFICANT CHANGE UP
EGFR: 109 ML/MIN/1.73M2 — SIGNIFICANT CHANGE UP
GLUCOSE SERPL-MCNC: 90 MG/DL — SIGNIFICANT CHANGE UP (ref 70–99)
HCT VFR BLD CALC: 42.3 % — SIGNIFICANT CHANGE UP (ref 34.5–45)
HGB BLD-MCNC: 13.1 G/DL — SIGNIFICANT CHANGE UP (ref 11.5–15.5)
LIDOCAIN IGE QN: 23 U/L — SIGNIFICANT CHANGE UP (ref 7–60)
MCHC RBC-ENTMCNC: 24.4 PG — LOW (ref 27–34)
MCHC RBC-ENTMCNC: 31 GM/DL — LOW (ref 32–36)
MCV RBC AUTO: 78.9 FL — LOW (ref 80–100)
NRBC # BLD AUTO: 0 K/UL — SIGNIFICANT CHANGE UP (ref 0–0)
NRBC # BLD: 0 /100 WBCS — SIGNIFICANT CHANGE UP (ref 0–0)
NRBC # FLD: 0 K/UL — SIGNIFICANT CHANGE UP (ref 0–0)
NRBC BLD-RTO: 0 /100 WBCS — SIGNIFICANT CHANGE UP (ref 0–0)
PLATELET # BLD AUTO: 209 K/UL — SIGNIFICANT CHANGE UP (ref 150–400)
POTASSIUM SERPL-MCNC: 4.1 MMOL/L — SIGNIFICANT CHANGE UP (ref 3.5–5.3)
POTASSIUM SERPL-SCNC: 4.1 MMOL/L — SIGNIFICANT CHANGE UP (ref 3.5–5.3)
PROT SERPL-MCNC: 7.8 G/DL — SIGNIFICANT CHANGE UP (ref 6–8.3)
RBC # BLD: 5.36 M/UL — HIGH (ref 3.8–5.2)
RBC # FLD: 13.6 % — SIGNIFICANT CHANGE UP (ref 10.3–14.5)
SODIUM SERPL-SCNC: 138 MMOL/L — SIGNIFICANT CHANGE UP (ref 135–145)
TROPONIN T, HIGH SENSITIVITY RESULT: <6 NG/L — SIGNIFICANT CHANGE UP
WBC # BLD: 5.41 K/UL — SIGNIFICANT CHANGE UP (ref 3.8–10.5)
WBC # FLD AUTO: 5.41 K/UL — SIGNIFICANT CHANGE UP (ref 3.8–10.5)

## 2024-10-09 PROCEDURE — 99285 EMERGENCY DEPT VISIT HI MDM: CPT

## 2024-10-09 PROCEDURE — 93010 ELECTROCARDIOGRAM REPORT: CPT

## 2024-10-09 PROCEDURE — 71046 X-RAY EXAM CHEST 2 VIEWS: CPT | Mod: 26

## 2024-10-09 RX ORDER — KETOROLAC TROMETHAMINE 30 MG/ML
15 INJECTION, SOLUTION INTRAMUSCULAR; INTRAVENOUS ONCE
Refills: 0 | Status: DISCONTINUED | OUTPATIENT
Start: 2024-10-09 | End: 2024-10-09

## 2024-10-09 RX ORDER — SUCRALFATE 1 G
1 TABLET ORAL ONCE
Refills: 0 | Status: COMPLETED | OUTPATIENT
Start: 2024-10-09 | End: 2024-10-09

## 2024-10-09 RX ORDER — MAGNESIUM, ALUMINUM HYDROXIDE 200-200 MG
30 TABLET,CHEWABLE ORAL ONCE
Refills: 0 | Status: COMPLETED | OUTPATIENT
Start: 2024-10-09 | End: 2024-10-09

## 2024-10-09 RX ADMIN — Medication 30 MILLILITER(S): at 20:53

## 2024-10-09 RX ADMIN — KETOROLAC TROMETHAMINE 15 MILLIGRAM(S): 30 INJECTION, SOLUTION INTRAMUSCULAR; INTRAVENOUS at 20:54

## 2024-10-09 RX ADMIN — Medication 1000 MILLILITER(S): at 21:16

## 2024-10-09 RX ADMIN — Medication 1 GRAM(S): at 21:15

## 2024-10-09 RX ADMIN — Medication 20 MILLIGRAM(S): at 20:54

## 2024-10-14 ENCOUNTER — NON-APPOINTMENT (OUTPATIENT)
Age: 54
End: 2024-10-14

## 2024-10-14 ENCOUNTER — APPOINTMENT (OUTPATIENT)
Dept: CARDIOLOGY | Facility: CLINIC | Age: 54
End: 2024-10-14
Payer: MEDICAID

## 2024-10-14 VITALS
OXYGEN SATURATION: 100 % | DIASTOLIC BLOOD PRESSURE: 88 MMHG | HEIGHT: 61 IN | SYSTOLIC BLOOD PRESSURE: 133 MMHG | BODY MASS INDEX: 33.99 KG/M2 | WEIGHT: 180 LBS | HEART RATE: 69 BPM

## 2024-10-14 DIAGNOSIS — R07.9 CHEST PAIN, UNSPECIFIED: ICD-10-CM

## 2024-10-14 PROBLEM — K29.70 GASTRITIS, UNSPECIFIED, WITHOUT BLEEDING: Chronic | Status: ACTIVE | Noted: 2024-10-09

## 2024-10-14 PROCEDURE — 99214 OFFICE O/P EST MOD 30 MIN: CPT | Mod: 25

## 2024-10-14 PROCEDURE — 93000 ELECTROCARDIOGRAM COMPLETE: CPT

## 2024-10-28 ENCOUNTER — RESULT REVIEW (OUTPATIENT)
Age: 54
End: 2024-10-28

## 2024-10-28 ENCOUNTER — OUTPATIENT (OUTPATIENT)
Dept: OUTPATIENT SERVICES | Facility: HOSPITAL | Age: 54
LOS: 1 days | End: 2024-10-28
Payer: MEDICAID

## 2024-10-28 ENCOUNTER — APPOINTMENT (OUTPATIENT)
Dept: CV DIAGNOSTICS | Facility: HOSPITAL | Age: 54
End: 2024-10-28

## 2024-10-28 DIAGNOSIS — R07.9 CHEST PAIN, UNSPECIFIED: ICD-10-CM

## 2024-10-28 PROCEDURE — 93018 CV STRESS TEST I&R ONLY: CPT | Mod: MC

## 2024-10-28 PROCEDURE — 93017 CV STRESS TEST TRACING ONLY: CPT

## 2024-10-28 PROCEDURE — A9500: CPT

## 2024-10-28 PROCEDURE — 78452 HT MUSCLE IMAGE SPECT MULT: CPT | Mod: 26,MC

## 2024-10-28 PROCEDURE — 93016 CV STRESS TEST SUPVJ ONLY: CPT | Mod: MC

## 2024-10-28 PROCEDURE — 78452 HT MUSCLE IMAGE SPECT MULT: CPT | Mod: MC

## 2024-11-11 ENCOUNTER — APPOINTMENT (OUTPATIENT)
Dept: PULMONOLOGY | Facility: CLINIC | Age: 54
End: 2024-11-11

## 2024-11-11 ENCOUNTER — MED ADMIN CHARGE (OUTPATIENT)
Age: 54
End: 2024-11-11

## 2024-11-11 ENCOUNTER — APPOINTMENT (OUTPATIENT)
Dept: PULMONOLOGY | Facility: CLINIC | Age: 54
End: 2024-11-11
Payer: MEDICAID

## 2024-11-11 VITALS
HEART RATE: 75 BPM | DIASTOLIC BLOOD PRESSURE: 80 MMHG | OXYGEN SATURATION: 98 % | BODY MASS INDEX: 34.23 KG/M2 | SYSTOLIC BLOOD PRESSURE: 129 MMHG | HEIGHT: 60.5 IN | WEIGHT: 179 LBS

## 2024-11-11 PROCEDURE — 99214 OFFICE O/P EST MOD 30 MIN: CPT | Mod: 25

## 2024-11-11 PROCEDURE — G0008: CPT

## 2024-11-11 PROCEDURE — 94010 BREATHING CAPACITY TEST: CPT

## 2024-11-11 PROCEDURE — 90656 IIV3 VACC NO PRSV 0.5 ML IM: CPT

## 2024-11-11 RX ORDER — BUDESONIDE AND FORMOTEROL FUMARATE DIHYDRATE 80; 4.5 UG/1; UG/1
80-4.5 AEROSOL RESPIRATORY (INHALATION) TWICE DAILY
Qty: 1 | Refills: 5 | Status: ACTIVE | COMMUNITY
Start: 2024-11-11 | End: 1900-01-01

## 2024-11-11 RX ORDER — ASPIRIN 81 MG/1
81 TABLET, CHEWABLE ORAL
Refills: 0 | Status: ACTIVE | COMMUNITY
Start: 2024-11-11

## 2024-11-12 ENCOUNTER — NON-APPOINTMENT (OUTPATIENT)
Age: 54
End: 2024-11-12

## 2024-11-14 ENCOUNTER — TRANSCRIPTION ENCOUNTER (OUTPATIENT)
Age: 54
End: 2024-11-14

## 2024-11-18 ENCOUNTER — TRANSCRIPTION ENCOUNTER (OUTPATIENT)
Age: 54
End: 2024-11-18

## 2024-11-18 ENCOUNTER — RX RENEWAL (OUTPATIENT)
Age: 54
End: 2024-11-18

## 2024-11-25 ENCOUNTER — TRANSCRIPTION ENCOUNTER (OUTPATIENT)
Age: 54
End: 2024-11-25

## 2024-11-27 ENCOUNTER — APPOINTMENT (OUTPATIENT)
Dept: RHEUMATOLOGY | Facility: CLINIC | Age: 54
End: 2024-11-27
Payer: MEDICAID

## 2024-11-27 VITALS
OXYGEN SATURATION: 98 % | BODY MASS INDEX: 35.34 KG/M2 | DIASTOLIC BLOOD PRESSURE: 87 MMHG | HEIGHT: 60 IN | RESPIRATION RATE: 16 BRPM | HEART RATE: 79 BPM | WEIGHT: 180 LBS | SYSTOLIC BLOOD PRESSURE: 137 MMHG

## 2024-11-27 DIAGNOSIS — R76.8 OTHER SPECIFIED ABNORMAL IMMUNOLOGICAL FINDINGS IN SERUM: ICD-10-CM

## 2024-11-27 DIAGNOSIS — E06.3 AUTOIMMUNE THYROIDITIS: ICD-10-CM

## 2024-11-27 DIAGNOSIS — M54.50 LOW BACK PAIN, UNSPECIFIED: ICD-10-CM

## 2024-11-27 DIAGNOSIS — M25.50 PAIN IN UNSPECIFIED JOINT: ICD-10-CM

## 2024-11-27 DIAGNOSIS — R20.2 PARESTHESIA OF SKIN: ICD-10-CM

## 2024-11-27 LAB
ALBUMIN SERPL ELPH-MCNC: 4 G/DL
ALP BLD-CCNC: 108 U/L
ALT SERPL-CCNC: 36 U/L
ANION GAP SERPL CALC-SCNC: 12 MMOL/L
AST SERPL-CCNC: 26 U/L
BILIRUB SERPL-MCNC: 0.2 MG/DL
BUN SERPL-MCNC: 15 MG/DL
CALCIUM SERPL-MCNC: 9.7 MG/DL
CCP AB SER IA-ACNC: <8 U/ML
CENTROMERE IGG SER-ACNC: <0.2 AL
CHLORIDE SERPL-SCNC: 106 MMOL/L
CO2 SERPL-SCNC: 24 MMOL/L
CREAT SERPL-MCNC: 0.67 MG/DL
CRP SERPL-MCNC: <3 MG/L
DSDNA AB SER-ACNC: 1 IU/ML
EGFR: 106 ML/MIN/1.73M2
ENA RNP AB SER IA-ACNC: 0.5 AL
ENA SCL70 IGG SER IA-ACNC: <0.2 AL
ENA SM AB SER IA-ACNC: <0.2 AL
ENA SS-A AB SER IA-ACNC: 0.5 AL
ENA SS-B AB SER IA-ACNC: <0.2 AL
ERYTHROCYTE [SEDIMENTATION RATE] IN BLOOD BY WESTERGREN METHOD: 63 MM/HR
ESTIMATED AVERAGE GLUCOSE: 111 MG/DL
FOLATE SERPL-MCNC: 13 NG/ML
HBA1C MFR BLD HPLC: 5.5 %
POTASSIUM SERPL-SCNC: 4.4 MMOL/L
PROT SERPL-MCNC: 7.6 G/DL
RF+CCP IGG SER-IMP: NEGATIVE
RHEUMATOID FACT SER QL: <10 IU/ML
SODIUM SERPL-SCNC: 142 MMOL/L
THYROGLOB AB SERPL-ACNC: 27.6 IU/ML
THYROPEROXIDASE AB SERPL IA-ACNC: 11.6 IU/ML
TSH SERPL-ACNC: 1.84 UIU/ML
VIT B12 SERPL-MCNC: 579 PG/ML

## 2024-11-27 PROCEDURE — 99214 OFFICE O/P EST MOD 30 MIN: CPT

## 2024-11-28 LAB
APPEARANCE: CLEAR
BILIRUBIN URINE: NEGATIVE
BLOOD URINE: NEGATIVE
COLOR: YELLOW
CREAT SPEC-SCNC: 99 MG/DL
CREAT/PROT UR: 0.1 RATIO
GLUCOSE QUALITATIVE U: NEGATIVE MG/DL
KETONES URINE: NEGATIVE MG/DL
LEUKOCYTE ESTERASE URINE: NEGATIVE
NITRITE URINE: NEGATIVE
PH URINE: 5.5
PROT UR-MCNC: 8 MG/DL
PROTEIN URINE: NEGATIVE MG/DL
SPECIFIC GRAVITY URINE: 1.02
UROBILINOGEN URINE: 0.2 MG/DL

## 2024-11-29 LAB
ANA PAT FLD IF-IMP: ABNORMAL
ANA SER IF-ACNC: ABNORMAL

## 2024-12-01 LAB — RNA POLYMERASE III IGG: 10 UNITS

## 2024-12-08 ENCOUNTER — OUTPATIENT (OUTPATIENT)
Dept: OUTPATIENT SERVICES | Facility: HOSPITAL | Age: 54
LOS: 1 days | End: 2024-12-08
Payer: MEDICAID

## 2024-12-08 DIAGNOSIS — Z00.8 ENCOUNTER FOR OTHER GENERAL EXAMINATION: ICD-10-CM

## 2024-12-08 PROCEDURE — 73564 X-RAY EXAM KNEE 4 OR MORE: CPT

## 2024-12-08 PROCEDURE — 73610 X-RAY EXAM OF ANKLE: CPT

## 2024-12-08 PROCEDURE — 76536 US EXAM OF HEAD AND NECK: CPT

## 2024-12-16 ENCOUNTER — APPOINTMENT (OUTPATIENT)
Dept: PAIN MANAGEMENT | Facility: CLINIC | Age: 54
End: 2024-12-16

## 2024-12-23 DIAGNOSIS — R76.8 OTHER SPECIFIED ABNORMAL IMMUNOLOGICAL FINDINGS IN SERUM: ICD-10-CM

## 2025-01-08 ENCOUNTER — APPOINTMENT (OUTPATIENT)
Dept: PAIN MANAGEMENT | Facility: CLINIC | Age: 55
End: 2025-01-08

## 2025-01-15 ENCOUNTER — APPOINTMENT (OUTPATIENT)
Dept: PAIN MANAGEMENT | Facility: CLINIC | Age: 55
End: 2025-01-15

## 2025-01-15 VITALS
SYSTOLIC BLOOD PRESSURE: 119 MMHG | HEIGHT: 60 IN | HEART RATE: 75 BPM | BODY MASS INDEX: 35.34 KG/M2 | WEIGHT: 180 LBS | DIASTOLIC BLOOD PRESSURE: 86 MMHG

## 2025-01-15 PROCEDURE — 99213 OFFICE O/P EST LOW 20 MIN: CPT

## 2025-01-18 ENCOUNTER — NON-APPOINTMENT (OUTPATIENT)
Age: 55
End: 2025-01-18

## 2025-01-29 ENCOUNTER — APPOINTMENT (OUTPATIENT)
Dept: OPHTHALMOLOGY | Facility: CLINIC | Age: 55
End: 2025-01-29
Payer: MEDICAID

## 2025-01-29 ENCOUNTER — NON-APPOINTMENT (OUTPATIENT)
Age: 55
End: 2025-01-29

## 2025-01-29 PROCEDURE — 92004 COMPRE OPH EXAM NEW PT 1/>: CPT | Mod: 25

## 2025-01-29 PROCEDURE — 92250 FUNDUS PHOTOGRAPHY W/I&R: CPT

## 2025-01-31 ENCOUNTER — NON-APPOINTMENT (OUTPATIENT)
Age: 55
End: 2025-01-31

## 2025-01-31 DIAGNOSIS — E78.5 HYPERLIPIDEMIA, UNSPECIFIED: ICD-10-CM

## 2025-02-10 ENCOUNTER — APPOINTMENT (OUTPATIENT)
Dept: RHEUMATOLOGY | Facility: CLINIC | Age: 55
End: 2025-02-10

## 2025-03-06 ENCOUNTER — APPOINTMENT (OUTPATIENT)
Dept: OPHTHALMOLOGY | Facility: CLINIC | Age: 55
End: 2025-03-06

## 2025-03-06 ENCOUNTER — APPOINTMENT (OUTPATIENT)
Dept: OBGYN | Facility: CLINIC | Age: 55
End: 2025-03-06
Payer: MEDICAID

## 2025-03-06 ENCOUNTER — NON-APPOINTMENT (OUTPATIENT)
Age: 55
End: 2025-03-06

## 2025-03-06 VITALS
DIASTOLIC BLOOD PRESSURE: 86 MMHG | HEIGHT: 60 IN | WEIGHT: 181 LBS | SYSTOLIC BLOOD PRESSURE: 129 MMHG | BODY MASS INDEX: 35.53 KG/M2

## 2025-03-06 DIAGNOSIS — L29.2 PRURITUS VULVAE: ICD-10-CM

## 2025-03-06 DIAGNOSIS — N89.8 OTHER SPECIFIED NONINFLAMMATORY DISORDERS OF VAGINA: ICD-10-CM

## 2025-03-06 DIAGNOSIS — Z11.3 ENCOUNTER FOR SCREENING FOR INFECTIONS WITH A PREDOMINANTLY SEXUAL MODE OF TRANSMISSION: ICD-10-CM

## 2025-03-06 DIAGNOSIS — Z01.411 ENCOUNTER FOR GYNECOLOGICAL EXAMINATION (GENERAL) (ROUTINE) WITH ABNORMAL FINDINGS: ICD-10-CM

## 2025-03-06 PROCEDURE — 99214 OFFICE O/P EST MOD 30 MIN: CPT | Mod: 25

## 2025-03-06 PROCEDURE — 99396 PREV VISIT EST AGE 40-64: CPT

## 2025-03-06 PROCEDURE — 99459 PELVIC EXAMINATION: CPT

## 2025-03-06 RX ORDER — TRIAMCINOLONE ACETONIDE 5 MG/G
0.5 OINTMENT TOPICAL TWICE DAILY
Qty: 1 | Refills: 3 | Status: ACTIVE | COMMUNITY
Start: 2025-03-06 | End: 1900-01-01

## 2025-03-09 LAB
C TRACH RRNA SPEC QL NAA+PROBE: NOT DETECTED
CANDIDA VAG CYTO: NOT DETECTED
G VAGINALIS+PREV SP MTYP VAG QL MICRO: NOT DETECTED
HPV HIGH+LOW RISK DNA PNL CVX: NOT DETECTED
N GONORRHOEA RRNA SPEC QL NAA+PROBE: NOT DETECTED
SOURCE AMPLIFICATION: NORMAL
SOURCE TP AMPLIFICATION: NORMAL
T VAGINALIS RRNA SPEC QL NAA+PROBE: NOT DETECTED
T VAGINALIS VAG QL WET PREP: NOT DETECTED

## 2025-03-10 LAB — CYTOLOGY CVX/VAG DOC THIN PREP: NORMAL

## 2025-03-11 ENCOUNTER — TRANSCRIPTION ENCOUNTER (OUTPATIENT)
Age: 55
End: 2025-03-11

## 2025-03-11 ENCOUNTER — NON-APPOINTMENT (OUTPATIENT)
Age: 55
End: 2025-03-11

## 2025-03-17 ENCOUNTER — APPOINTMENT (OUTPATIENT)
Dept: RHEUMATOLOGY | Facility: CLINIC | Age: 55
End: 2025-03-17

## 2025-03-31 ENCOUNTER — APPOINTMENT (OUTPATIENT)
Dept: MRI IMAGING | Facility: CLINIC | Age: 55
End: 2025-03-31

## 2025-04-02 ENCOUNTER — TRANSCRIPTION ENCOUNTER (OUTPATIENT)
Age: 55
End: 2025-04-02

## 2025-04-07 ENCOUNTER — APPOINTMENT (OUTPATIENT)
Dept: PAIN MANAGEMENT | Facility: CLINIC | Age: 55
End: 2025-04-07

## 2025-04-25 ENCOUNTER — APPOINTMENT (OUTPATIENT)
Dept: ENDOCRINOLOGY | Facility: CLINIC | Age: 55
End: 2025-04-25
Payer: MEDICAID

## 2025-04-25 VITALS
SYSTOLIC BLOOD PRESSURE: 116 MMHG | HEART RATE: 61 BPM | DIASTOLIC BLOOD PRESSURE: 78 MMHG | WEIGHT: 182 LBS | OXYGEN SATURATION: 98 % | BODY MASS INDEX: 35.54 KG/M2

## 2025-04-25 DIAGNOSIS — E06.3 AUTOIMMUNE THYROIDITIS: ICD-10-CM

## 2025-04-25 DIAGNOSIS — E04.2 NONTOXIC MULTINODULAR GOITER: ICD-10-CM

## 2025-04-25 DIAGNOSIS — E66.811 OBESITY, CLASS 1: ICD-10-CM

## 2025-04-25 PROCEDURE — 99214 OFFICE O/P EST MOD 30 MIN: CPT

## 2025-04-25 PROCEDURE — G2211 COMPLEX E/M VISIT ADD ON: CPT | Mod: NC

## 2025-04-25 NOTE — ED PROVIDER NOTE - ATTENDING CONTRIBUTION TO CARE
I Called patient to moved her to a PA or Kurt and patient stated she did not complete her PFT due to it would cost her $700.00 and she has other bills she has to pay and it would cost her 60 bucks every time she comes in our office. She stated Trelegy is working for her and she is only taking it every other day due to it would cost her 175 every refill. But she is happy with Trelegy. I advised if she would like to come in and speak with a provider in the office to see if there is any there things to discuss since the testing cost to much but she wanted to decline coming back in at this time. I advised her when she is ready she can call our office to r/s this appt.  Just an FYI.    Seen and examined, states hx of HTN and typically on single pill of triple combo drug and well controlled. Due to insurance change combo med no longer covered, PMD changed to single drug regimen, only olmesartan instead of olmesartan/amlodipine/HCTZ. Now on single drug for nearly 1 mo., has been checking with wrist cuff and seeing mostly -150 but today noted higher and in urgentcare was 180. Pt. also had epigastric c/o that she has had in the past and often improves after belching. Due to this c/o while at urgentcare had EKG and was told "Abnl" and sent to ED. In ED pt. NAD, no CP or epigastric pain at time of exam. EKG from urgentcare reviewed, c/w ED EKG no dynamic changes. MMM, clear lungs, heart reg, abd soft, NT to palp, no CVAT, no edema, good pulses. Seen and examined, states after covid vax dev. epigastric pain. Pt. also had epigastric c/o that she has had in the past and often improves after belching. Due to this c/o while at urgentcare had EKG and was told "Abnl" and sent to ED. In ED pt. NAD, no CP or epigastric pain at time of exam. EKG from urgentcare reviewed, c/w ED EKG no dynamic changes. MMM, clear lungs, heart reg, abd soft, NT to palp, no CVAT, no edema, good pulses.

## 2025-04-26 LAB
T4 FREE SERPL-MCNC: 1.4 NG/DL
THYROGLOB AB SERPL-ACNC: 43.5 IU/ML
THYROPEROXIDASE AB SERPL IA-ACNC: 16.9 IU/ML
TSH SERPL-ACNC: 2.04 UIU/ML

## 2025-04-29 ENCOUNTER — TRANSCRIPTION ENCOUNTER (OUTPATIENT)
Age: 55
End: 2025-04-29

## 2025-05-02 ENCOUNTER — TRANSCRIPTION ENCOUNTER (OUTPATIENT)
Age: 55
End: 2025-05-02

## 2025-05-07 ENCOUNTER — TRANSCRIPTION ENCOUNTER (OUTPATIENT)
Age: 55
End: 2025-05-07

## 2025-05-08 ENCOUNTER — APPOINTMENT (OUTPATIENT)
Dept: ULTRASOUND IMAGING | Facility: IMAGING CENTER | Age: 55
End: 2025-05-08
Payer: MEDICAID

## 2025-05-08 ENCOUNTER — OUTPATIENT (OUTPATIENT)
Dept: OUTPATIENT SERVICES | Facility: HOSPITAL | Age: 55
LOS: 1 days | End: 2025-05-08
Payer: MEDICAID

## 2025-05-08 ENCOUNTER — RESULT REVIEW (OUTPATIENT)
Age: 55
End: 2025-05-08

## 2025-05-08 ENCOUNTER — APPOINTMENT (OUTPATIENT)
Dept: MAMMOGRAPHY | Facility: IMAGING CENTER | Age: 55
End: 2025-05-08
Payer: MEDICAID

## 2025-05-08 DIAGNOSIS — Z01.411 ENCOUNTER FOR GYNECOLOGICAL EXAMINATION (GENERAL) (ROUTINE) WITH ABNORMAL FINDINGS: ICD-10-CM

## 2025-05-08 PROCEDURE — 77063 BREAST TOMOSYNTHESIS BI: CPT

## 2025-05-08 PROCEDURE — 77067 SCR MAMMO BI INCL CAD: CPT

## 2025-05-08 PROCEDURE — 77063 BREAST TOMOSYNTHESIS BI: CPT | Mod: 26

## 2025-05-08 PROCEDURE — 76641 ULTRASOUND BREAST COMPLETE: CPT | Mod: 26,50

## 2025-05-08 PROCEDURE — 77067 SCR MAMMO BI INCL CAD: CPT | Mod: 26

## 2025-05-08 PROCEDURE — 76641 ULTRASOUND BREAST COMPLETE: CPT

## 2025-05-16 ENCOUNTER — APPOINTMENT (OUTPATIENT)
Dept: PULMONOLOGY | Facility: CLINIC | Age: 55
End: 2025-05-16

## 2025-06-30 ENCOUNTER — APPOINTMENT (OUTPATIENT)
Dept: CARDIOLOGY | Facility: CLINIC | Age: 55
End: 2025-06-30

## 2025-06-30 VITALS
SYSTOLIC BLOOD PRESSURE: 116 MMHG | DIASTOLIC BLOOD PRESSURE: 74 MMHG | HEART RATE: 69 BPM | WEIGHT: 180 LBS | OXYGEN SATURATION: 97 % | BODY MASS INDEX: 35.15 KG/M2

## 2025-06-30 PROCEDURE — 99214 OFFICE O/P EST MOD 30 MIN: CPT | Mod: 25

## 2025-06-30 PROCEDURE — 93000 ELECTROCARDIOGRAM COMPLETE: CPT

## 2025-07-09 ENCOUNTER — APPOINTMENT (OUTPATIENT)
Dept: CARDIOLOGY | Facility: CLINIC | Age: 55
End: 2025-07-09
Payer: MEDICAID

## 2025-07-09 VITALS
DIASTOLIC BLOOD PRESSURE: 73 MMHG | OXYGEN SATURATION: 98 % | SYSTOLIC BLOOD PRESSURE: 128 MMHG | WEIGHT: 180 LBS | HEART RATE: 81 BPM | HEIGHT: 60 IN | BODY MASS INDEX: 35.34 KG/M2

## 2025-07-09 PROCEDURE — 99213 OFFICE O/P EST LOW 20 MIN: CPT

## 2025-07-09 PROCEDURE — G2211 COMPLEX E/M VISIT ADD ON: CPT | Mod: NC

## 2025-08-22 ENCOUNTER — NON-APPOINTMENT (OUTPATIENT)
Age: 55
End: 2025-08-22

## 2025-09-02 ENCOUNTER — RX RENEWAL (OUTPATIENT)
Age: 55
End: 2025-09-02

## (undated) DEVICE — FOLEY HOLDER STATLOCK 2 WAY ADULT

## (undated) DEVICE — SYR LUER LOK 5CC

## (undated) DEVICE — TUBING SUCTION CONN 6FT STERILE

## (undated) DEVICE — SYR LUER LOK 50CC

## (undated) DEVICE — SUCTION YANKAUER NO CONTROL VENT

## (undated) DEVICE — SYR LUER LOK 20CC